# Patient Record
Sex: FEMALE | Race: WHITE | NOT HISPANIC OR LATINO | Employment: OTHER | URBAN - NONMETROPOLITAN AREA
[De-identification: names, ages, dates, MRNs, and addresses within clinical notes are randomized per-mention and may not be internally consistent; named-entity substitution may affect disease eponyms.]

---

## 2017-04-14 ENCOUNTER — HOSPITAL ENCOUNTER (OUTPATIENT)
Dept: LAB | Facility: MEDICAL CENTER | Age: 47
End: 2017-04-14
Attending: PHYSICIAN ASSISTANT
Payer: COMMERCIAL

## 2017-04-14 LAB
25(OH)D3 SERPL-MCNC: 23 NG/ML (ref 30–100)
ALBUMIN SERPL BCP-MCNC: 3.8 G/DL (ref 3.2–4.9)
ALBUMIN/GLOB SERPL: 1.1 G/DL
ALP SERPL-CCNC: 70 U/L (ref 30–99)
ALT SERPL-CCNC: 10 U/L (ref 2–50)
ANION GAP SERPL CALC-SCNC: 5 MMOL/L (ref 0–11.9)
AST SERPL-CCNC: 13 U/L (ref 12–45)
BASOPHILS # BLD AUTO: 0.5 % (ref 0–1.8)
BASOPHILS # BLD: 0.03 K/UL (ref 0–0.12)
BILIRUB SERPL-MCNC: 0.5 MG/DL (ref 0.1–1.5)
BUN SERPL-MCNC: 11 MG/DL (ref 8–22)
CALCIUM SERPL-MCNC: 9.2 MG/DL (ref 8.5–10.5)
CHLORIDE SERPL-SCNC: 104 MMOL/L (ref 96–112)
CHOLEST SERPL-MCNC: 159 MG/DL (ref 100–199)
CO2 SERPL-SCNC: 26 MMOL/L (ref 20–33)
CREAT SERPL-MCNC: 0.48 MG/DL (ref 0.5–1.4)
EOSINOPHIL # BLD AUTO: 0.04 K/UL (ref 0–0.51)
EOSINOPHIL NFR BLD: 0.6 % (ref 0–6.9)
ERYTHROCYTE [DISTWIDTH] IN BLOOD BY AUTOMATED COUNT: 44.1 FL (ref 35.9–50)
FOLATE SERPL-MCNC: 8.1 NG/ML
GFR SERPL CREATININE-BSD FRML MDRD: >60 ML/MIN/1.73 M 2
GLOBULIN SER CALC-MCNC: 3.4 G/DL (ref 1.9–3.5)
GLUCOSE SERPL-MCNC: 86 MG/DL (ref 65–99)
HCT VFR BLD AUTO: 38.5 % (ref 37–47)
HDLC SERPL-MCNC: 55 MG/DL
HGB BLD-MCNC: 12.8 G/DL (ref 12–16)
IMM GRANULOCYTES # BLD AUTO: 0 K/UL (ref 0–0.11)
IMM GRANULOCYTES NFR BLD AUTO: 0 % (ref 0–0.9)
IRON SATN MFR SERPL: 22 % (ref 15–55)
IRON SERPL-MCNC: 78 UG/DL (ref 40–170)
LDLC SERPL CALC-MCNC: 91 MG/DL
LYMPHOCYTES # BLD AUTO: 2.23 K/UL (ref 1–4.8)
LYMPHOCYTES NFR BLD: 36.2 % (ref 22–41)
MCH RBC QN AUTO: 30.7 PG (ref 27–33)
MCHC RBC AUTO-ENTMCNC: 33.2 G/DL (ref 33.6–35)
MCV RBC AUTO: 92.3 FL (ref 81.4–97.8)
MONOCYTES # BLD AUTO: 0.31 K/UL (ref 0–0.85)
MONOCYTES NFR BLD AUTO: 5 % (ref 0–13.4)
NEUTROPHILS # BLD AUTO: 3.55 K/UL (ref 2–7.15)
NEUTROPHILS NFR BLD: 57.7 % (ref 44–72)
NRBC # BLD AUTO: 0 K/UL
NRBC BLD AUTO-RTO: 0 /100 WBC
PLATELET # BLD AUTO: 250 K/UL (ref 164–446)
PMV BLD AUTO: 11.5 FL (ref 9–12.9)
POTASSIUM SERPL-SCNC: 4.3 MMOL/L (ref 3.6–5.5)
PREALB SERPL-MCNC: 19 MG/DL (ref 18–38)
PROT SERPL-MCNC: 7.2 G/DL (ref 6–8.2)
RBC # BLD AUTO: 4.17 M/UL (ref 4.2–5.4)
SODIUM SERPL-SCNC: 135 MMOL/L (ref 135–145)
TIBC SERPL-MCNC: 360 UG/DL (ref 250–450)
TRANSFERRIN SERPL-MCNC: 258 MG/DL (ref 200–370)
TRIGL SERPL-MCNC: 65 MG/DL (ref 0–149)
VIT B12 SERPL-MCNC: 721 PG/ML (ref 211–911)
WBC # BLD AUTO: 6.2 K/UL (ref 4.8–10.8)

## 2017-04-14 PROCEDURE — 84466 ASSAY OF TRANSFERRIN: CPT

## 2017-04-14 PROCEDURE — 82607 VITAMIN B-12: CPT

## 2017-04-14 PROCEDURE — 84134 ASSAY OF PREALBUMIN: CPT

## 2017-04-14 PROCEDURE — 80061 LIPID PANEL: CPT

## 2017-04-14 PROCEDURE — 83540 ASSAY OF IRON: CPT

## 2017-04-14 PROCEDURE — 36415 COLL VENOUS BLD VENIPUNCTURE: CPT

## 2017-04-14 PROCEDURE — 85025 COMPLETE CBC W/AUTO DIFF WBC: CPT

## 2017-04-14 PROCEDURE — 82306 VITAMIN D 25 HYDROXY: CPT

## 2017-04-14 PROCEDURE — 83550 IRON BINDING TEST: CPT

## 2017-04-14 PROCEDURE — 80053 COMPREHEN METABOLIC PANEL: CPT

## 2017-04-14 PROCEDURE — 82746 ASSAY OF FOLIC ACID SERUM: CPT

## 2017-04-14 PROCEDURE — 84425 ASSAY OF VITAMIN B-1: CPT

## 2017-04-18 LAB — VIT B1 BLD-MCNC: 102 NMOL/L (ref 70–180)

## 2017-05-04 ENCOUNTER — HOSPITAL ENCOUNTER (OUTPATIENT)
Dept: RADIOLOGY | Facility: MEDICAL CENTER | Age: 47
End: 2017-05-04
Attending: OBSTETRICS & GYNECOLOGY
Payer: COMMERCIAL

## 2017-05-04 DIAGNOSIS — R10.2 ADNEXAL TENDERNESS, RIGHT: ICD-10-CM

## 2017-05-04 PROCEDURE — 700117 HCHG RX CONTRAST REV CODE 255: Performed by: OBSTETRICS & GYNECOLOGY

## 2017-05-04 PROCEDURE — 72193 CT PELVIS W/DYE: CPT

## 2017-05-04 RX ADMIN — IOHEXOL 100 ML: 350 INJECTION, SOLUTION INTRAVENOUS at 09:21

## 2017-05-12 ENCOUNTER — HOSPITAL ENCOUNTER (OUTPATIENT)
Dept: RADIOLOGY | Facility: MEDICAL CENTER | Age: 47
End: 2017-05-12
Attending: PHYSICIAN ASSISTANT
Payer: COMMERCIAL

## 2017-05-12 DIAGNOSIS — E66.01 MORBID OBESITY, UNSPECIFIED OBESITY TYPE (HCC): ICD-10-CM

## 2017-05-12 DIAGNOSIS — Z90.3 HISTORY OF SLEEVE GASTRECTOMY: ICD-10-CM

## 2017-05-12 DIAGNOSIS — R11.0 NAUSEA: ICD-10-CM

## 2017-05-12 DIAGNOSIS — R11.2 NAUSEA AND VOMITING, INTRACTABILITY OF VOMITING NOT SPECIFIED, UNSPECIFIED VOMITING TYPE: ICD-10-CM

## 2017-05-12 DIAGNOSIS — K21.9 GASTROESOPHAGEAL REFLUX DISEASE, ESOPHAGITIS PRESENCE NOT SPECIFIED: ICD-10-CM

## 2017-05-12 PROCEDURE — 74241 DX-UPPER GI-SERIES WITH KUB: CPT

## 2017-05-13 ENCOUNTER — OFFICE VISIT (OUTPATIENT)
Dept: URGENT CARE | Facility: PHYSICIAN GROUP | Age: 47
End: 2017-05-13
Payer: COMMERCIAL

## 2017-05-13 VITALS
OXYGEN SATURATION: 98 % | RESPIRATION RATE: 16 BRPM | DIASTOLIC BLOOD PRESSURE: 80 MMHG | TEMPERATURE: 99 F | HEART RATE: 80 BPM | BODY MASS INDEX: 51.67 KG/M2 | SYSTOLIC BLOOD PRESSURE: 124 MMHG | WEIGHT: 293 LBS

## 2017-05-13 DIAGNOSIS — H65.111 ACUTE MUCOID OTITIS MEDIA OF RIGHT EAR: ICD-10-CM

## 2017-05-13 DIAGNOSIS — H73.012 BULLOUS MYRINGITIS, LEFT: ICD-10-CM

## 2017-05-13 PROCEDURE — 99214 OFFICE O/P EST MOD 30 MIN: CPT | Performed by: PHYSICIAN ASSISTANT

## 2017-05-13 RX ORDER — AMOXICILLIN AND CLAVULANATE POTASSIUM 875; 125 MG/1; MG/1
1 TABLET, FILM COATED ORAL 2 TIMES DAILY
Qty: 20 TAB | Refills: 0 | Status: SHIPPED | OUTPATIENT
Start: 2017-05-13 | End: 2017-05-23

## 2017-05-13 RX ORDER — HYDROCODONE BITARTRATE AND ACETAMINOPHEN 7.5; 325 MG/1; MG/1
1-2 TABLET ORAL EVERY 6 HOURS PRN
Qty: 10 TAB | Refills: 0 | Status: SHIPPED | OUTPATIENT
Start: 2017-05-13 | End: 2017-12-20

## 2017-05-13 RX ORDER — OFLOXACIN 3 MG/ML
5 SOLUTION AURICULAR (OTIC) 2 TIMES DAILY
Qty: 1 BOTTLE | Refills: 1 | Status: SHIPPED | OUTPATIENT
Start: 2017-05-13 | End: 2017-12-20

## 2017-05-13 ASSESSMENT — ENCOUNTER SYMPTOMS
CARDIOVASCULAR NEGATIVE: 1
GASTROINTESTINAL NEGATIVE: 1
PSYCHIATRIC NEGATIVE: 1
DIZZINESS: 0
RESPIRATORY NEGATIVE: 1
MUSCULOSKELETAL NEGATIVE: 1
CONSTITUTIONAL NEGATIVE: 1
EYES NEGATIVE: 1

## 2017-05-13 NOTE — PATIENT INSTRUCTIONS

## 2017-05-13 NOTE — MR AVS SNAPSHOT
Nneka Ruiz   2017 9:25 AM   Office Visit   MRN: [id number]    Department:  [facility]   Dept Phone:  [phone]    Description:  Female : 1970   Provider:  Andrey Maza PA-C           Reason for Visit     Otalgia           Allergies as of 2017     Allergen Noted Reactions    Aspirin 2007   Vomiting    Other Misc 2016       Waterproof band aids took off skin and caused infection       You were diagnosed with     Acute mucoid otitis media of right ear   [6389447]       Bullous myringitis, left   [[id number]]         Vital Signs     Blood Pressure Pulse Temperature Respirations Weight Oxygen Saturation    124/80 mmHg 80 37.2 °C (99 °F) 16 149.687 kg (330 lb) 98%    Smoking Status                   Former Smoker           Basic Information     Date Of Birth Sex Race Ethnicity Preferred Language    1970 Female White Non- English      Problem List              ICD-10-CM Priority Class Noted - Resolved    Ruptured ear drum H72.90   2012 - Present    Depression F32.9   2012 - Present    Pedal edema R60.0   2012 - Present    Obesity E66.9   2012 - Present    Central perforation of tympanic membrane H72.00   2012 - Present    Tear of medial cartilage or meniscus of knee, current [id number]   12/10/2014 - Present    Morbid obesity (CMS-HCC) E66.01   2/3/2016 - Present      Health Maintenance        Date Due Completion Dates    IMM DTaP/Tdap/Td Vaccine (1 - Tdap) 1991 ---    PAP SMEAR 1993 ---    MAMMOGRAM 2012 ---            Current Immunizations     Influenza Vaccine Adult HD 2015    Pertussis Vaccine 2015      Below and/or attached are the medications your provider expects you to take. Review all of your home medications and newly ordered medications with your provider and/or pharmacist. Follow medication instructions as directed by your provider and/or pharmacist. Please keep your medication list with you  and share with your provider. Update the information when medications are discontinued, doses are changed, or new medications (including over-the-counter products) are added; and carry medication information at all times in the event of emergency situations     Allergies:  ASPIRIN - Vomiting     OTHER MISC - (reactions not documented)               Medications  Valid as of: May 13, 2017 -  3:21 PM    Generic Name Brand Name Tablet Size Instructions for use    Acetaminophen (Tab) TYLENOL 500 MG Take 500-1,000 mg by mouth every 6 hours as needed.        Amoxicillin-Pot Clavulanate (Tab) AUGMENTIN 875-125 MG Take 1 Tab by mouth 2 times a day for 10 days.        Ampicillin (Cap) PRINCIPEN 500 MG Take 500 mg by mouth 4 times a day.        Biotin (Tab) Biotin 5000 MCG Take 2 Tabs by mouth every day.        Citalopram Hydrobromide (Tab) CELEXA 20 MG Take 20 mg by mouth every morning.        Hydrocodone-Acetaminophen (Tab) NORCO 7.5-325 MG Take 1-2 Tabs by mouth every 6 hours as needed.        Lisinopril-Hydrochlorothiazide (Tab) PRINZIDE, ZESTORETIC 20-12.5 MG Take 1 Tab by mouth every morning.        Meclizine HCl (Tab) ANTIVERT 25 MG Take 25 mg by mouth 3 times a day as needed.        Multiple Vitamins-Minerals (Tab) HAIR/SKIN/NAILS  Take 1 Tab by mouth every day.        Ofloxacin (Solution) FLOXIN OTIC 0.3 % Place 5 Drops in ear 2 times a day.        Ondansetron HCl (Tab) ZOFRAN 4 MG Take 1 Tab by mouth every 8 hours as needed for Nausea/Vomiting.        Sulfamethoxazole-Trimethoprim (Tab) BACTRIM -160 MG Take 1 Tab by mouth 2 times a day.        .                 Medicines prescribed today were sent to:     [id number] - [place], [place] - [address] [facility] [address]    [address] [place] [address]    Phone: [phone] Fax: [phone]    Open 24 Hours?: No      Medication refill instructions:       If your prescription bottle indicates you have medication  refills left, it is not necessary to call your provider’s office. Please contact your pharmacy and they will refill your medication.    If your prescription bottle indicates you do not have any refills left, you may request refills at any time through one of the following ways: The online [employer] system (except Urgent Care), by calling your provider’s office, or by asking your pharmacy to contact your provider’s office with a refill request. Medication refills are processed only during regular business hours and may not be available until the next business day. Your provider may request additional information or to have a follow-up visit with you prior to refilling your medication.   *Please Note: Medication refills are assigned a new Rx number when refilled electronically. Your pharmacy may indicate that no refills were authorized even though a new prescription for the same medication is available at the pharmacy. Please request the medicine by name with the pharmacy before contacting your provider for a refill.        Instructions    Otitis Media, Adult  Otitis media is redness, soreness, and inflammation of the middle ear. Otitis media may be caused by allergies or, most commonly, by infection. Often it occurs as a complication of the common cold.  SIGNS AND SYMPTOMS  Symptoms of otitis media may include:  · Earache.  · Fever.  · Ringing in your ear.  · Headache.  · Leakage of fluid from the ear.  DIAGNOSIS  To diagnose otitis media, your health care provider will examine your ear with an otoscope. This is an instrument that allows your health care provider to see into your ear in order to examine your eardrum. Your health care provider also will ask you questions about your symptoms.  TREATMENT   Typically, otitis media resolves on its own within 3-5 days. Your health care provider may prescribe medicine to ease your symptoms of pain. If otitis media does not resolve within 5 days or is recurrent, your health  care provider may prescribe antibiotic medicines if he or she suspects that a bacterial infection is the cause.  HOME CARE INSTRUCTIONS   · If you were prescribed an antibiotic medicine, finish it all even if you start to feel better.  · Take medicines only as directed by your health care provider.  · Keep all follow-up visits as directed by your health care provider.  SEEK MEDICAL CARE IF:  · You have otitis media only in one ear, or bleeding from your nose, or both.  · You notice a lump on your neck.  · You are not getting better in 3-5 days.  · You feel worse instead of better.  SEEK IMMEDIATE MEDICAL CARE IF:   · You have pain that is not controlled with medicine.  · You have swelling, redness, or pain around your ear or stiffness in your neck.  · You notice that part of your face is paralyzed.  · You notice that the bone behind your ear (mastoid) is tender when you touch it.  MAKE SURE YOU:   · Understand these instructions.  · Will watch your condition.  · Will get help right away if you are not doing well or get worse.     This information is not intended to replace advice given to you by your health care provider. Make sure you discuss any questions you have with your health care provider.     Document Released: 09/22/2005 Document Revised: 01/08/2016 Document Reviewed: 07/15/2014  [employer] Patient Education ©2016 [employer]            [employer] Access Code: Activation code not generated  Current [employer] Status: Active

## 2017-05-13 NOTE — PROGRESS NOTES
Subjective:      Nneka Ruiz is a 46 y.o. female who presents with Otalgia            Otalgia   Associated symptoms include ear discharge.     Chief Complaint   Patient presents with   • Otalgia       HPI:  Nneka Ruiz is a 46 y.o. female who presents with ear pain both sides.  Right ruptured yesterday.  Left ear ruptured this am.  Dr. Savage rx cipro based ear drops, >$200 patient did not .  Picked up cortisporin instead and has been using for the past several days.  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  Hx of repeat OM with perforations.  Not on oral abx.  Ear pain since Wednesday.  Patient denies HA, SOB, chest pain, palpitations, fever, chills, or n/v/d.      Past Medical History   Diagnosis Date   • Pain 2014     left knee   • Anxiety    • Anemia in pregnancy    • Hypertension    • Dental disorder      lower partial   • Psychiatric problem      anxiety       Past Surgical History   Procedure Laterality Date   • Tubal ligation     • Tonsillectomy     • Tympanoplasty  11/6/2012     Performed by Jonn Kaufman M.D. at SURGERY SAME DAY [facility]   • Knee arthroscopy  12/10/2014     Performed by Mikel Spencer M.D. at SURGERY [facility]   • Medial meniscectomy  12/10/2014     Performed by Mikel Spencer M.D. at [facility]   • Meniscectomy  12/10/2014     Performed by Mikel Spencer M.D. at [facility]   • Lateral release  12/10/2014     Performed by Mikel Spencer M.D. at [facility]   • Gastric sleeve laparoscopy N/A 2/3/2016     Procedure: GASTRIC SLEEVE LAPAROSCOPY AND LIVER BIOPSY;  Surgeon: Keegan Hernandez M.D.;  Location: SURGERY [facility];  Service:        No family history on file.    Social History     Social History   • Marital Status:      Spouse Name: N/A   • Number of Children: N/A   • Years of Education: N/A     Occupational History   • Not on file.     Social History Main  Topics   • Smoking status: Former Smoker -- 1.00 packs/day for 12 years     Types: Cigarettes     Quit date: 12/01/2014   • Smokeless tobacco: Never Used      Comment: 2 yrs  1/2 ppd   • Alcohol Use: Yes      Comment: 0-2 drinks per month   • Drug Use: No      Comment: meth  non since 2001   • Sexual Activity: Not on file     Other Topics Concern   • Not on file     Social History Narrative         Current outpatient prescriptions:   •  sulfamethoxazole-trimethoprim, 1 Tab, Oral, BID  •  ampicillin, 500 mg, Oral, 4XDAY, not taking  •  meclizine, 25 mg, Oral, TID PRN, prn  •  ondansetron, 4 mg, Oral, Q8HRS PRN, prn  •  lisinopril-hydrochlorothiazide, 1 Tab, Oral, QAM, 7/21/2016 at 0700  •  citalopram, 20 mg, Oral, QAM, 7/21/2016 at 0700  •  Biotin, 2 Tab, Oral, DAILY, not taking  •  HAIR/SKIN/NAILS, 1 Tab, Oral, DAILY, 7/21/2016  •  acetaminophen, 500-1,000 mg, Oral, Q6HRS PRN, 7/21/2016    Allergies   Allergen Reactions   • Aspirin Vomiting   • Other Misc      Waterproof band aids took off skin and caused infection             Review of Systems   Constitutional: Negative.    HENT: Positive for congestion, ear discharge and ear pain.    Eyes: Negative.    Respiratory: Negative.    Cardiovascular: Negative.    Gastrointestinal: Negative.    Genitourinary: Negative.    Musculoskeletal: Negative.    Skin: Negative.    Neurological: Negative for dizziness.   Endo/Heme/Allergies: Negative.    Psychiatric/Behavioral: Negative.           Objective:     /80 mmHg  Pulse 80  Temp(Src) 37.2 °C (99 °F)  Resp 16  Wt 149.687 kg (330 lb)  SpO2 98%     Physical Exam       Nursing note and vitals reviewed.    Constitutional:   Appropriately groomed, pleasant affect, well nourished, in NAD.    Head:   Normocephalic, atraumatic.    Eyes:   PERRLA, EOM's full, sclera white, conjunctiva not erythematous, and medial canthus without exudate bilaterally.    Ears:  Tragus tender to manipulation.  No pre-auricular lymphadenopathy  or mastoid ttp.  EACs with mild cerumen bilaterally, not erythematous.  Left TM bulging and injected with loss of anatomical landmarks and bullae present on TM.  Mucous present in EAC.  Right TM with Mucopurulent fluid present in the inner ear and bulging.  No apparent perforation. Neither TM vibrates with valsalva maneuver, patient occluding nares bilaterally.  Concern for micro perforation.  No mastoid ttp bilaterally.  Pain only with auricular manipulation bilaterally.  Minimal anterior cervical chain lymphadenopathy.  Hearing grossly intact to voice.    Nose:  Nares bilaterally.  Nasal mucosa not edematous. Maxillary Sinuses mildly tender to percussion bilaterally.    Throat:  Dentition wnl, mucosa moist without lesions.  Oropharynx mildly erythematous, with no enlargement of the palatine tonsils bilaterally with no exudates.    Post nasal drainage present.  Soft palate rises symmetrically bilaterally and uvula midline.      Neck: Neck supple, with mild anterior lymphadenopathy that is soft and mobile to palpation. Thyroid non-palpable without tenderness or nodules. No supraclavicular lymphadenopathy.    Lungs:  Respiratory effort not labored without accessory muscle use.  Lungs clear to auscultation bilaterally without wheezes or rales. Rhonchi clear to cough.    Heart:  RRR, without murmurs rubs or gallops.  Radial and dorsalis pedis pulse 2+ bilaterally.  No LE edema.    Musculoskeletal:  Gait non-antalgic with a narrow base.    Derm:  Skin without rashes or lesions with good turgor pressure.      Psychiatric:  Mood, affect, and judgement appropriate.         Assessment/Plan:     1. Acute mucoid otitis media of right ear  Patient presents with otitis media bilaterally with bullous myringitis of the left. Concern for perforation as mucopurulent material present in the external auditory canal bilaterally. No visible perforation, however TMs do not vibrate with Valsalva maneuver. Patient prescribed  neomycin-based antibiotic, topical. Discussed with patient to immediately stop this due to concern for inner ear bone damage. Prescribed Augmentin ×10 days and ofloxacin eardrops. Discussed with patient how to administer. Patient to follow-up with urgent care in 3-4 days or ENT for reevaluation. Pain medication for bedtime only. Discussed not driving with this and not combining with antianxiety medication.    Narcotic use report obtained with no indication of narcotic overuse or misuse and deemed necessary for treatment. Sedation, dependence, and constipation precautions given. Avoid use while driving or operating heavy machinery.     Patient was in agreement with this treatment plan and seemed to understand without barriers. All questions were encouraged and answered.  Reviewed signs and symptoms of when to seek emergency medical care.     Please note that this dictation was created using voice recognition software.  I have made every reasonable attempt to correct obvious errors, but I expect there are errors of vandana and possibly content that I did not discover before finalizing the note.     - amoxicillin-clavulanate (AUGMENTIN) 875-125 MG Tab; Take 1 Tab by mouth 2 times a day for 10 days.  Dispense: 20 Tab; Refill: 0  - ofloxacin otic sol (FLOXIN OTIC) 0.3 % Solution; Place 5 Drops in ear 2 times a day.  Dispense: 1 Bottle; Refill: 1  - hydrocodone-acetaminophen (NORCO) 7.5-325 MG per tablet; Take 1-2 Tabs by mouth every 6 hours as needed.  Dispense: 10 Tab; Refill: 0    2. Bullous myringitis, left    - amoxicillin-clavulanate (AUGMENTIN) 875-125 MG Tab; Take 1 Tab by mouth 2 times a day for 10 days.  Dispense: 20 Tab; Refill: 0  - ofloxacin otic sol (FLOXIN OTIC) 0.3 % Solution; Place 5 Drops in ear 2 times a day.  Dispense: 1 Bottle; Refill: 1  - hydrocodone-acetaminophen (NORCO) 7.5-325 MG per tablet; Take 1-2 Tabs by mouth every 6 hours as needed.  Dispense: 10 Tab; Refill: 0

## 2017-05-17 ENCOUNTER — HOSPITAL ENCOUNTER (OUTPATIENT)
Facility: MEDICAL CENTER | Age: 47
End: 2017-05-17
Attending: COLON & RECTAL SURGERY | Admitting: COLON & RECTAL SURGERY
Payer: COMMERCIAL

## 2017-05-17 ENCOUNTER — APPOINTMENT (OUTPATIENT)
Dept: RADIOLOGY | Facility: MEDICAL CENTER | Age: 47
End: 2017-05-17
Attending: COLON & RECTAL SURGERY
Payer: COMMERCIAL

## 2017-05-17 VITALS
DIASTOLIC BLOOD PRESSURE: 102 MMHG | TEMPERATURE: 97.5 F | HEIGHT: 67 IN | HEART RATE: 93 BPM | RESPIRATION RATE: 16 BRPM | BODY MASS INDEX: 45.99 KG/M2 | SYSTOLIC BLOOD PRESSURE: 138 MMHG | WEIGHT: 293 LBS | OXYGEN SATURATION: 93 %

## 2017-05-17 PROBLEM — K22.2 STENOSIS OF ESOPHAGUS: Status: ACTIVE | Noted: 2017-05-17

## 2017-05-17 LAB
BASOPHILS # BLD AUTO: 0.7 % (ref 0–1.8)
BASOPHILS # BLD: 0.05 K/UL (ref 0–0.12)
EOSINOPHIL # BLD AUTO: 0.24 K/UL (ref 0–0.51)
EOSINOPHIL NFR BLD: 3.2 % (ref 0–6.9)
ERYTHROCYTE [DISTWIDTH] IN BLOOD BY AUTOMATED COUNT: 42.5 FL (ref 35.9–50)
HCG UR QL: NEGATIVE
HCT VFR BLD AUTO: 36.8 % (ref 37–47)
HGB BLD-MCNC: 12.5 G/DL (ref 12–16)
IMM GRANULOCYTES # BLD AUTO: 0.02 K/UL (ref 0–0.11)
IMM GRANULOCYTES NFR BLD AUTO: 0.3 % (ref 0–0.9)
LYMPHOCYTES # BLD AUTO: 2.28 K/UL (ref 1–4.8)
LYMPHOCYTES NFR BLD: 30 % (ref 22–41)
MCH RBC QN AUTO: 30.3 PG (ref 27–33)
MCHC RBC AUTO-ENTMCNC: 34 G/DL (ref 33.6–35)
MCV RBC AUTO: 89.3 FL (ref 81.4–97.8)
MONOCYTES # BLD AUTO: 0.41 K/UL (ref 0–0.85)
MONOCYTES NFR BLD AUTO: 5.4 % (ref 0–13.4)
NEUTROPHILS # BLD AUTO: 4.61 K/UL (ref 2–7.15)
NEUTROPHILS NFR BLD: 60.4 % (ref 44–72)
NRBC # BLD AUTO: 0 K/UL
NRBC BLD AUTO-RTO: 0 /100 WBC
PLATELET # BLD AUTO: 241 K/UL (ref 164–446)
PMV BLD AUTO: 10.7 FL (ref 9–12.9)
RBC # BLD AUTO: 4.12 M/UL (ref 4.2–5.4)
SP GR UR REFRACTOMETRY: 1.01
WBC # BLD AUTO: 7.6 K/UL (ref 4.8–10.8)

## 2017-05-17 PROCEDURE — 160002 HCHG RECOVERY MINUTES (STAT): Performed by: COLON & RECTAL SURGERY

## 2017-05-17 PROCEDURE — 160203 HCHG ENDO MINUTES - 1ST 30 MINS LEVEL 4: Performed by: COLON & RECTAL SURGERY

## 2017-05-17 PROCEDURE — 160046 HCHG PACU - 1ST 60 MINS PHASE II: Performed by: COLON & RECTAL SURGERY

## 2017-05-17 PROCEDURE — 700111 HCHG RX REV CODE 636 W/ 250 OVERRIDE (IP)

## 2017-05-17 PROCEDURE — 160025 RECOVERY II MINUTES (STATS): Performed by: COLON & RECTAL SURGERY

## 2017-05-17 PROCEDURE — 700101 HCHG RX REV CODE 250

## 2017-05-17 PROCEDURE — 160035 HCHG PACU - 1ST 60 MINS PHASE I: Performed by: COLON & RECTAL SURGERY

## 2017-05-17 PROCEDURE — 160009 HCHG ANES TIME/MIN: Performed by: COLON & RECTAL SURGERY

## 2017-05-17 PROCEDURE — 502240 HCHG MISC OR SUPPLY RC 0272: Performed by: COLON & RECTAL SURGERY

## 2017-05-17 PROCEDURE — 160048 HCHG OR STATISTICAL LEVEL 1-5: Performed by: COLON & RECTAL SURGERY

## 2017-05-17 PROCEDURE — 85025 COMPLETE CBC W/AUTO DIFF WBC: CPT

## 2017-05-17 PROCEDURE — 81025 URINE PREGNANCY TEST: CPT

## 2017-05-17 PROCEDURE — C1726 CATH, BAL DIL, NON-VASCULAR: HCPCS | Performed by: COLON & RECTAL SURGERY

## 2017-05-17 PROCEDURE — 500066 HCHG BITE BLOCK, ECT: Performed by: COLON & RECTAL SURGERY

## 2017-05-17 RX ORDER — MIDAZOLAM HYDROCHLORIDE 1 MG/ML
INJECTION INTRAMUSCULAR; INTRAVENOUS
Status: COMPLETED
Start: 2017-05-17 | End: 2017-05-17

## 2017-05-17 RX ORDER — OMEPRAZOLE 40 MG/1
40 CAPSULE, DELAYED RELEASE ORAL DAILY
Qty: 30 CAP | Refills: 3 | Status: SHIPPED | OUTPATIENT
Start: 2017-05-17 | End: 2019-03-01 | Stop reason: CLARIF

## 2017-05-17 RX ORDER — LIDOCAINE HYDROCHLORIDE 10 MG/ML
INJECTION, SOLUTION INFILTRATION; PERINEURAL
Status: COMPLETED
Start: 2017-05-17 | End: 2017-05-17

## 2017-05-17 RX ORDER — SUCRALFATE ORAL 1 G/10ML
1 SUSPENSION ORAL 4 TIMES DAILY
Qty: 1240 ML | Refills: 0 | Status: SHIPPED | OUTPATIENT
Start: 2017-05-17 | End: 2017-06-17

## 2017-05-17 RX ORDER — LIDOCAINE AND PRILOCAINE 25; 25 MG/G; MG/G
1 CREAM TOPICAL
Status: COMPLETED | OUTPATIENT
Start: 2017-05-17 | End: 2017-05-17

## 2017-05-17 RX ORDER — LIDOCAINE HYDROCHLORIDE 10 MG/ML
0.5 INJECTION, SOLUTION INFILTRATION; PERINEURAL
Status: COMPLETED | OUTPATIENT
Start: 2017-05-17 | End: 2017-05-17

## 2017-05-17 RX ORDER — SODIUM CHLORIDE, SODIUM LACTATE, POTASSIUM CHLORIDE, CALCIUM CHLORIDE 600; 310; 30; 20 MG/100ML; MG/100ML; MG/100ML; MG/100ML
INJECTION, SOLUTION INTRAVENOUS CONTINUOUS
Status: DISCONTINUED | OUTPATIENT
Start: 2017-05-17 | End: 2017-05-17 | Stop reason: HOSPADM

## 2017-05-17 RX ADMIN — MIDAZOLAM 2 MG: 1 INJECTION INTRAMUSCULAR; INTRAVENOUS at 08:16

## 2017-05-17 RX ADMIN — SODIUM CHLORIDE, SODIUM LACTATE, POTASSIUM CHLORIDE, CALCIUM CHLORIDE: 600; 310; 30; 20 INJECTION, SOLUTION INTRAVENOUS at 07:11

## 2017-05-17 RX ADMIN — LIDOCAINE HYDROCHLORIDE 0.5 ML: 10 INJECTION, SOLUTION INFILTRATION; PERINEURAL at 07:10

## 2017-05-17 ASSESSMENT — PAIN SCALES - GENERAL
PAINLEVEL_OUTOF10: 0

## 2017-05-17 NOTE — IP AVS SNAPSHOT
" Home Care Instructions                                                                                                                Name:Nneka Ruiz  Medical Record Number:[id number]  CSN: [id number]    YOB: 1970   Age: 46 y.o.  Sex: female  HT:1.702 m (5' 7\") WT: 150.3 kg (331 lb 5.6 oz)          Admit Date: 5/17/2017     Discharge Date:   Today's Date: 5/17/2017  Attending Doctor:  Keegan Hernandez M.D.                  Allergies:  Aspirin and Other misc                Discharge Instructions         ACTIVITY: Rest and take it easy for the first 24 hours.  A responsible adult is recommended to remain with you during that time.  It is normal to feel sleepy.  We encourage you to not do anything that requires balance, judgment or coordination.    MILD FLU-LIKE SYMPTOMS ARE NORMAL. YOU MAY EXPERIENCE GENERALIZED MUSCLE ACHES, THROAT IRRITATION, HEADACHE AND/OR SOME NAUSEA.    FOR 24 HOURS DO NOT:  Drive, operate machinery or run household appliances.  Drink beer or alcoholic beverages.   Make important decisions or sign legal documents.     SPECIAL INSTRUCTIONS:   *  1. DIET: Clear liquids and transition to shakes and smoothies. Progressing as instructed will make for a smooth transition after surgery and prevent any pain associated with eating foods before your stomach is ready or eating too much. Water and hydration is much more important than food intake the first week or two after surgery. Drink enough water to keep your urine pale yellow. Increase water intake if your urine is a darker yellow or if have burning with urination. Nausea and vomiting once or twice after you leave the hospital is normal. Sip fluids continually and stay hydrated.    2. SUPPLEMENTS: Start your supplements day after surgery. You may need to cut some supplements in half initially. Follow the guidelines from your supplement handout from your pre-op class.   **    FOLLOW-UP APPOINTMENT:  A follow-up appointment should be " arranged with your doctor in ***; call to schedule.    You should CALL YOUR PHYSICIAN if you develop:  Fever greater than 101 degrees F.  Pain not relieved by medication, or persistent nausea or vomiting.  Excessive bleeding (blood soaking through dressing) or unexpected drainage from the wound.  Extreme redness or swelling around the incision site, drainage of pus or foul smelling drainage.  Inability to urinate or empty your bladder within 8 hours.  Problems with breathing or chest pain.    You should call 911 if you develop problems with breathing or chest pain.  If you are unable to contact your doctor or surgical center, you should go to the nearest emergency room or urgent care center. Dr Hernandez  telephone #: *[phone]**    If any questions arise, call your doctor.  If your doctor is not available, please feel free to call the Surgical Center at [phone].  The Center is open Monday through Friday from 7AM to 7PM.  You can also call the HEALTH HOTLINE open 24 hours/day, 7 days/week and speak to a nurse at [phone], or toll free at [phone].    A registered nurse may call you a few days after your surgery to see how you are doing after your procedure.    MEDICATIONS: Resume taking daily medication.  Take prescribed pain medication with food.  If no medication is prescribed, you may take non-aspirin pain medication if needed.  PAIN MEDICATION CAN BE VERY CONSTIPATING.  Take a stool softener or laxative such as senokot, pericolace, or milk of magnesia if needed.    Prescription given for *prilosec and carafate**.  Last pain medication given at *none**.    If your physician has prescribed pain medication that includes Acetaminophen (Tylenol), do not take additional Acetaminophen (Tylenol) while taking the prescribed medication.    Depression / Suicide Risk    As you are discharged from this [facility] facility, it is important to learn how to keep safe from harming yourself.    Recognize the  warning signs:  · Abrupt changes in personality, positive or negative- including increase in energy   · Giving away possessions  · Change in eating patterns- significant weight changes-  positive or negative  · Change in sleeping patterns- unable to sleep or sleeping all the time   · Unwillingness or inability to communicate  · Depression  · Unusual sadness, discouragement and loneliness  · Talk of wanting to die  · Neglect of personal appearance   · Rebelliousness- reckless behavior  · Withdrawal from people/activities they love  · Confusion- inability to concentrate     If you or a loved one observes any of these behaviors or has concerns about self-harm, here's what you can do:  · Talk about it- your feelings and reasons for harming yourself  · Remove any means that you might use to hurt yourself (examples: pills, rope, extension cords, firearm)  · Get professional help from the community (Mental Health, Substance Abuse, psychological counseling)  · Do not be alone:Call your Safe Contact- someone whom you trust who will be there for you.  · Call your local CRISIS HOTLINE 693-3607 or 023-217-5665  · Call your local Children's Mobile Crisis Response Team [place] (092) 038-7030 or www.[employer]  · Call the toll free National Suicide Prevention Hotlines   · National Suicide Prevention Lifeline 831-451-ZHPR (3300)  · National Hope Line Network 800-SUICIDE (750-5518)       Medication List      START taking these medications        Instructions    Morning Afternoon Evening Bedtime    omeprazole 40 MG delayed-release capsule   Commonly known as:  PRILOSEC        Take 1 Cap by mouth every day.   Dose:  40 mg                        sucralfate 1 GM/10ML Susp   Commonly known as:  CARAFATE        Take 10 mL by mouth 4 times a day for 31 days.   Dose:  1 g                          CONTINUE taking these medications        Instructions    Morning Afternoon Evening Bedtime    acetaminophen 500 MG Tabs   Commonly  known as:  TYLENOL        Take 500-1,000 mg by mouth every 6 hours as needed.   Dose:  500-1000 mg                        amoxicillin-clavulanate 875-125 MG Tabs   Commonly known as:  AUGMENTIN        Take 1 Tab by mouth 2 times a day for 10 days.   Dose:  1 Tab                        citalopram 20 MG Tabs   Commonly known as:  CELEXA        Take 20 mg by mouth every morning.   Dose:  20 mg                        hydrocodone-acetaminophen 7.5-325 MG per tablet   Commonly known as:  NORCO        Take 1-2 Tabs by mouth every 6 hours as needed.   Dose:  1-2 Tab                        lisinopril-hydrochlorothiazide 20-12.5 MG per tablet   Commonly known as:  PRINZIDE, ZESTORETIC        Take 1 Tab by mouth every morning.   Dose:  1 Tab                        multivitamin Tabs        Take 1 Tab by mouth every day.   Dose:  1 Tab                        ofloxacin otic sol 0.3 % Soln   Commonly known as:  FLOXIN OTIC        Place 5 Drops in ear 2 times a day.   Dose:  5 Drop                             Where to Get Your Medications      You can get these medications from any pharmacy     Bring a paper prescription for each of these medications    - omeprazole 40 MG delayed-release capsule  - sucralfate 1 GM/10ML Susp            Medication Information     Above and/or attached are the medications your physician expects you to take upon discharge. Review all of your home medications and newly ordered medications with your doctor and/or pharmacist. Follow medication instructions as directed by your doctor and/or pharmacist. Please keep your medication list with you and share with your physician. Update the information when medications are discontinued, doses are changed, or new medications (including over-the-counter products) are added; and carry medication information at all times in the event of emergency situations.        Resources     Quit Smoking / Tobacco Use:    I understand the use of any tobacco products increases my  chance of suffering from future heart disease or stroke and could cause other illnesses which may shorten my life. Quitting the use of tobacco products is the single most important thing I can do to improve my health. For further information on smoking / tobacco cessation call a Toll Free Quit Line at 1-310.437.1318 (*National [employer]) or 1-934.373.7676 (American Lung Association) or you can access the web based program at www.lungusa.org.    [place] Tobacco Users Help Line:  (345) 336-9624       Toll Free: 1-579.914.3360  Quit Tobacco Program [facility] (270)240-0198    Crisis Hotline:    [place] Crisis Hotline:  1-034-EOSEFAN or 1-380.822.1387    [place] Crisis Hotline:    1-640.957.8650 or 639-811-8725    Discharge Survey:   Thank you for choosing [facility]. We hope we did everything we could to make your hospital stay a pleasant one. You may be receiving a survey and we would appreciate your time and participation in answering the questions. Your input is very valuable to us in our efforts to improve our service to our patients and their families.            Signatures     My signature on this form indicates that:    1. I acknowledge receipt and understanding of these Home Care Instruction.  2. My questions regarding this information have been answered to my satisfaction.  3. I have formulated a plan with my discharge nurse to obtain my prescribed medications for home.    __________________________________      __________________________________                   Patient Signature                                 Guardian/Responsible Adult Signature      __________________________________                 __________       ________                       Nurse Signature                                               Date                 Time

## 2017-05-17 NOTE — DISCHARGE INSTRUCTIONS
ACTIVITY: Rest and take it easy for the first 24 hours.  A responsible adult is recommended to remain with you during that time.  It is normal to feel sleepy.  We encourage you to not do anything that requires balance, judgment or coordination.    MILD FLU-LIKE SYMPTOMS ARE NORMAL. YOU MAY EXPERIENCE GENERALIZED MUSCLE ACHES, THROAT IRRITATION, HEADACHE AND/OR SOME NAUSEA.    FOR 24 HOURS DO NOT:  Drive, operate machinery or run household appliances.  Drink beer or alcoholic beverages.   Make important decisions or sign legal documents.     SPECIAL INSTRUCTIONS:   *  1. DIET: Clear liquids and transition to shakes and smoothies. Progressing as instructed will make for a smooth transition after surgery and prevent any pain associated with eating foods before your stomach is ready or eating too much. Water and hydration is much more important than food intake the first week or two after surgery. Drink enough water to keep your urine pale yellow. Increase water intake if your urine is a darker yellow or if have burning with urination. Nausea and vomiting once or twice after you leave the hospital is normal. Sip fluids continually and stay hydrated.    2. SUPPLEMENTS: Start your supplements day after surgery. You may need to cut some supplements in half initially. Follow the guidelines from your supplement handout from your pre-op class.   **    FOLLOW-UP APPOINTMENT:  A follow-up appointment should be arranged with your doctor in ***; call to schedule.    You should CALL YOUR PHYSICIAN if you develop:  Fever greater than 101 degrees F.  Pain not relieved by medication, or persistent nausea or vomiting.  Excessive bleeding (blood soaking through dressing) or unexpected drainage from the wound.  Extreme redness or swelling around the incision site, drainage of pus or foul smelling drainage.  Inability to urinate or empty your bladder within 8 hours.  Problems with breathing or chest pain.    You should call 911 if you  develop problems with breathing or chest pain.  If you are unable to contact your doctor or surgical center, you should go to the nearest emergency room or urgent care center. Dr Hernandez  telephone #: *[phone]**    If any questions arise, call your doctor.  If your doctor is not available, please feel free to call the Surgical Center at [phone].  The Center is open Monday through Friday from 7AM to 7PM.  You can also call the HEALTH HOTLINE open 24 hours/day, 7 days/week and speak to a nurse at [phone], or toll free at [phone].    A registered nurse may call you a few days after your surgery to see how you are doing after your procedure.    MEDICATIONS: Resume taking daily medication.  Take prescribed pain medication with food.  If no medication is prescribed, you may take non-aspirin pain medication if needed.  PAIN MEDICATION CAN BE VERY CONSTIPATING.  Take a stool softener or laxative such as senokot, pericolace, or milk of magnesia if needed.    Prescription given for *prilosec and carafate**.  Last pain medication given at *none**.    If your physician has prescribed pain medication that includes Acetaminophen (Tylenol), do not take additional Acetaminophen (Tylenol) while taking the prescribed medication.    Depression / Suicide Risk    As you are discharged from this [facility] [place] [facility] facility, it is important to learn how to keep safe from harming yourself.    Recognize the warning signs:  · Abrupt changes in personality, positive or negative- including increase in energy   · Giving away possessions  · Change in eating patterns- significant weight changes-  positive or negative  · Change in sleeping patterns- unable to sleep or sleeping all the time   · Unwillingness or inability to communicate  · Depression  · Unusual sadness, discouragement and loneliness  · Talk of wanting to die  · Neglect of personal appearance   · Rebelliousness- reckless behavior  · Withdrawal from people/activities  they love  · Confusion- inability to concentrate     If you or a loved one observes any of these behaviors or has concerns about self-harm, here's what you can do:  · Talk about it- your feelings and reasons for harming yourself  · Remove any means that you might use to hurt yourself (examples: pills, rope, extension cords, firearm)  · Get professional help from the community (Mental Health, Substance Abuse, psychological counseling)  · Do not be alone:Call your Safe Contact- someone whom you trust who will be there for you.  · Call your local CRISIS HOTLINE 651-9007 or 587-450-4861  · Call your local Children's Mobile Crisis Response Team [place] (204) 372-3071 or www.[employer]  · Call the toll free National Suicide Prevention Hotlines   · National Suicide Prevention Lifeline 964-428-CWBS (6985)  · National Hope Line Network 800-SUICIDE (724-2764)

## 2017-05-17 NOTE — IP AVS SNAPSHOT
5/17/2017    Nneka Ruiz  [address]  [place] [address]    Dear Nneka:    [facility] wants to ensure your discharge home is safe and you or your loved ones have had all of your questions answered regarding your care after you leave the hospital.    Below is a list of resources and contact information should you have any questions regarding your hospital stay, follow-up instructions, or active medical symptoms.    Questions or Concerns Regarding… Contact   Medical Questions Related to Your Discharge  (7 days a week, 8am-5pm) Contact a Nurse Care Coordinator   [phone]   Medical Questions Not Related to Your Discharge  (24 hours a day / 7 days a week)  Contact the Nurse Health Line   [phone]    Medications or Discharge Instructions Refer to your discharge packet   or contact your [facility] Primary Care Provider   [phone]   Follow-up Appointment(s) Schedule your appointment via [employer]   or contact Scheduling [phone]   Billing Review your statement via [employer]  or contact Billing [phone]   Medical Records Review your records via [employer]   or contact Medical Records [phone]     You may receive a telephone call within two days of discharge. This call is to make certain you understand your discharge instructions and have the opportunity to have any questions answered. You can also easily access your medical information, test results and upcoming appointments via the Comeet free online health management tool. You can learn more and sign up at [email or web address]. For assistance setting up your Comeet account, please call [phone].    Once again, we want to ensure your discharge home is safe and that you have a clear understanding of any next steps in your care. If you have any questions or concerns, please do not hesitate to contact us, we are here for you. Thank you for choosing [facility] for your healthcare needs.    Sincerely,    Your [facility] Healthcare Team

## 2017-05-17 NOTE — OR NURSING
Patient denies pain at this time. VSS. Patient tolerating water and ice chips without difficulty. Report called to Jacqueline POWERS. Patient transferred to  23

## 2017-05-17 NOTE — OR NURSING
Received from pacu at 853.  Vss. Alert and oriented times three.  Dc instructions given verbalized understanding.  Wanting to walk out.  Balance steady, walked to door.

## 2017-05-17 NOTE — OP REPORT
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 female with a history of dyspepsia, prior sleeve gastrectomy. She is taken to the operating room today for Esophagogastroduodenoscopy and dilatation for suspected stenosis.     DETAILS OF PROCEDURE: After an extensive informed consent discussion and   process, the patient was brought to the operating room and was placed in a   supine position on the endoscopy table. The patient was then given general anesthesia and endotracheal tube intubation. During the course of the procedure, the patient was monitored continuously with pulse oximetry, telemetry, and intermittent blood pressure readings.     After placement of the oral bite block to protect the teeth, gums, and gastroscope, the gastroscope was slowly introduced and advanced into the esophagus. It was slowly advanced down to the gastroesophageal junction region. The GE junction appeared normal. The gastroscope passed without difficulty into the proximal body of the stomach, which appeared normal and consistent with sleeve resection.     A mild stenosis was present at the proximal sleeve. The gastroscope was then advanced into the duodenum without difficulty. The first, second, and third portions of the duodenum appeared normal with no evidence of duodenitis or ulcers. Slowly, the gastroscope was withdrawn, and the duodenum appeared normal. The antrum appeared normal. A retroflexion view was not possible to be safely performed. The  guidewire was advanced into the duodenum. The 30mm pneumatic balloon was selected and the wire-guided system was advanced down to straddle the stenosis. The stenosis was then successfully dilated to 30mm, for 3 minutes each at two different stations . After the balloon was deflated and withdrawn, the diagnostic endoscope was introduced and the area was inspected and showed trace blood and no evidence of untoward events. The stenosis appeared successfully dilated.     The gastroscope was slowly withdrawn as air was aspirated and the area of the proximal pouch and gastroesophageal junction region were again carefully inspected, which all appeared normal. The gastroesophageal junction was carefully inspected as was the esophagus as we withdrew the gastroscope and these areas appeared normal and were photographed for documentation purposes. The gastroscope was then withdrawn.    IMPRESSION/PLAN: Mild stenosis at proximal, dilated to 30mm.   May need repeat dilatation in coming weeks. Continue PPI.    The patient tolerated the procedure well and there were no apparent complications.  She was awakened and transferred to the recovery area in satisfactory condition.       ____________________________________   Keegan Hernandez MD  DD: 5/17/2017  8:15 AM    CC:  [facility];

## 2017-05-24 ENCOUNTER — OFFICE VISIT (OUTPATIENT)
Dept: URGENT CARE | Facility: PHYSICIAN GROUP | Age: 47
End: 2017-05-24
Payer: COMMERCIAL

## 2017-05-24 VITALS
TEMPERATURE: 96.6 F | RESPIRATION RATE: 16 BRPM | DIASTOLIC BLOOD PRESSURE: 80 MMHG | SYSTOLIC BLOOD PRESSURE: 126 MMHG | OXYGEN SATURATION: 98 % | BODY MASS INDEX: 48.82 KG/M2 | HEIGHT: 65 IN | WEIGHT: 293 LBS | HEART RATE: 78 BPM

## 2017-05-24 DIAGNOSIS — J98.01 ACUTE BRONCHOSPASM: ICD-10-CM

## 2017-05-24 DIAGNOSIS — J01.90 ACUTE RHINOSINUSITIS: ICD-10-CM

## 2017-05-24 DIAGNOSIS — M79.89 LEFT LEG SWELLING: ICD-10-CM

## 2017-05-24 PROCEDURE — 99214 OFFICE O/P EST MOD 30 MIN: CPT | Performed by: EMERGENCY MEDICINE

## 2017-05-24 RX ORDER — DOXYCYCLINE HYCLATE 100 MG
100 TABLET ORAL 2 TIMES DAILY
Qty: 14 TAB | Refills: 0 | Status: SHIPPED | OUTPATIENT
Start: 2017-05-24 | End: 2017-12-20

## 2017-05-24 ASSESSMENT — ENCOUNTER SYMPTOMS
HEMOPTYSIS: 0
HEADACHES: 1
SINUS PAIN: 1
COUGH: 1
JOINT SWELLING: 0
SORE THROAT: 0
NUMBNESS: 0
DIARRHEA: 0
WEAKNESS: 0
FEVER: 0
WHEEZING: 1
NAUSEA: 0
VOMITING: 0
LEG SWELLING: 1
CHILLS: 0
SPUTUM PRODUCTION: 1
SHORTNESS OF BREATH: 0

## 2017-05-25 ENCOUNTER — HOSPITAL ENCOUNTER (OUTPATIENT)
Dept: LAB | Facility: MEDICAL CENTER | Age: 47
End: 2017-05-25
Attending: EMERGENCY MEDICINE
Payer: COMMERCIAL

## 2017-05-25 DIAGNOSIS — M79.89 LEFT LEG SWELLING: ICD-10-CM

## 2017-05-25 LAB — DEPRECATED D DIMER PPP IA-ACNC: 219 NG/ML(D-DU)

## 2017-05-25 PROCEDURE — 85379 FIBRIN DEGRADATION QUANT: CPT

## 2017-05-25 PROCEDURE — 36415 COLL VENOUS BLD VENIPUNCTURE: CPT

## 2017-05-25 NOTE — PROGRESS NOTES
Subjective:      Nneka Ruiz is a 46 y.o. female who presents with Sinus Pain            Sinus Pain  This is a new problem. Episode onset: over one week. The problem has been rapidly worsening. Associated symptoms include congestion, coughing and headaches. Pertinent negatives include no chest pain, chills, fever, joint swelling, nausea, numbness, rash, sore throat, vomiting or weakness.   Leg Swelling  This is a recurrent problem. The current episode started today. The problem has been unchanged. Associated symptoms include congestion, coughing and headaches. Pertinent negatives include no chest pain, chills, fever, joint swelling, nausea, numbness, rash, sore throat, vomiting or weakness. She has tried nothing for the symptoms.    notes recent episode of ear infection treated with Augmentin; ear symptoms improved. No nasal congestion, increasing sinus pressure, coughing worsening over one week. F/U with ENT per PCP. Known problems with left knee, today noted left distal leg swelling. No trauma. Last week had previous surgical procedure, otherwise no recent prolonged immobilization.   Review of Systems   Constitutional: Negative for fever, chills and malaise/fatigue.   HENT: Positive for congestion and nosebleeds. Negative for ear discharge, ear pain and sore throat.    Respiratory: Positive for cough, sputum production and wheezing. Negative for hemoptysis and shortness of breath.    Cardiovascular: Negative for chest pain.   Gastrointestinal: Negative for nausea, vomiting and diarrhea.   Musculoskeletal: Negative for joint swelling.   Skin: Negative for rash.   Neurological: Positive for headaches. Negative for weakness and numbness.   Endo/Heme/Allergies: Negative for environmental allergies.     PMH:  has a past medical history of Pain (2014); Anxiety; Anemia in pregnancy; Hypertension; Dental disorder; Psychiatric problem; and Cold. She also has no past medical history of COPD, CAD (coronary artery  disease), or Liver disease.  MEDS:   Current outpatient prescriptions:   •  doxycycline (VIBRAMYCIN) 100 MG Tab, Take 1 Tab by mouth 2 times a day., Disp: 14 Tab, Rfl: 0  •  Albuterol Sulfate 108 (90 BASE) MCG/ACT AEROSOL POWDER, BREATH ACTIVATED, Inhale 1-2 Puffs by mouth every 6 hours as needed (coughing, wheezing)., Disp: 1 Each, Rfl: 0  •  multivitamin (THERAGRAN) Tab, Take 1 Tab by mouth every day., Disp: , Rfl:   •  sucralfate (CARAFATE) 1 GM/10ML Suspension, Take 10 mL by mouth 4 times a day for 31 days., Disp: 1240 mL, Rfl: 0  •  omeprazole (PRILOSEC) 40 MG delayed-release capsule, Take 1 Cap by mouth every day., Disp: 30 Cap, Rfl: 3  •  lisinopril-hydrochlorothiazide (PRINZIDE, ZESTORETIC) 20-12.5 MG per tablet, Take 1 Tab by mouth every morning., Disp: , Rfl:   •  citalopram (CELEXA) 20 MG Tab, Take 20 mg by mouth every morning., Disp: , Rfl:   •  acetaminophen (TYLENOL) 500 MG Tab, Take 500-1,000 mg by mouth every 6 hours as needed., Disp: , Rfl:   •  ofloxacin otic sol (FLOXIN OTIC) 0.3 % Solution, Place 5 Drops in ear 2 times a day. (Patient not taking: Reported on 5/24/2017), Disp: 1 Bottle, Rfl: 1  •  hydrocodone-acetaminophen (NORCO) 7.5-325 MG per tablet, Take 1-2 Tabs by mouth every 6 hours as needed. (Patient not taking: Reported on 5/24/2017), Disp: 10 Tab, Rfl: 0  ALLERGIES:   Allergies   Allergen Reactions   • Aspirin Vomiting   • Other Misc      Waterproof band aids took off skin and caused infection      SURGHX:   Past Surgical History   Procedure Laterality Date   • Tubal ligation     • Tonsillectomy     • Tympanoplasty  11/6/2012     Performed by Jonn Kaufman M.D. at SURGERY SAME DAY [facility] ORS   • Knee arthroscopy  12/10/2014     Performed by Mikel Spencer M.D. at [facility] ORS   • Medial meniscectomy  12/10/2014     Performed by Mikel Spencer M.D. at [facility]   • Meniscectomy  12/10/2014     Performed by Mikel Spencer M.D. at [facility]   • Lateral release  12/10/2014     Performed by Mikel Spencer M.D. at SURGERY [facility]   • Gastric sleeve laparoscopy N/A 2/3/2016     Procedure: GASTRIC SLEEVE LAPAROSCOPY AND LIVER BIOPSY;  Surgeon: Keegan Hernandez M.D.;  Location: SURGERY [facility];  Service:    • Gastroscopy  5/17/2017     Procedure: GASTROSCOPY W/DILATATION, 30-MM ACHALASIA;  Surgeon: Keegan Hernandez M.D.;  Location: SURGERY [facility];  Service:      SOCHX:  reports that she quit smoking about 2 years ago. Her smoking use included Cigarettes. She has a 12 pack-year smoking history. She has never used smokeless tobacco. She reports that she drinks alcohol. She reports that she does not use illicit drugs.  FH: family history is not on file.       Objective:     /80 mmHg  Pulse 78  Temp(Src) 35.9 °C (96.6 °F)  Resp 16  Ht 1.651 m (5' 5\")  Wt 147.419 kg (325 lb)  BMI 54.08 kg/m2  SpO2 98%  LMP 01/01/2012     Physical Exam   Constitutional: She is oriented to person, place, and time. She appears well-developed and well-nourished. She is cooperative. She does not appear ill. No distress.   HENT:   Right Ear: Tympanic membrane, external ear and ear canal normal. No mastoid tenderness. Tympanic membrane is not injected.   Left Ear: Ear canal normal. No mastoid tenderness. Tympanic membrane is scarred. Tympanic membrane is not injected.   Nose: Mucosal edema and rhinorrhea present. Right sinus exhibits maxillary sinus tenderness and frontal sinus tenderness. Left sinus exhibits maxillary sinus tenderness and frontal sinus tenderness.   Mouth/Throat: Uvula is midline and oropharynx is clear and moist.   Eyes: Conjunctivae are normal.   Neck: Trachea normal. Neck supple.   Cardiovascular: Normal rate, regular rhythm and normal heart sounds.    Pulmonary/Chest: Effort normal. She has no decreased breath sounds. She has no wheezes. She has no rhonchi. She has no rales.   Wheezy cough noted   Musculoskeletal: "        Right lower leg: She exhibits swelling and edema. She exhibits no tenderness, no bony tenderness and no deformity.   No calf tenderness, Homans sign negative.   Lymphadenopathy:     She has no cervical adenopathy.   Neurological: She is alert and oriented to person, place, and time.   Skin: Skin is warm, dry and intact. No rash noted.   Psychiatric: She has a normal mood and affect.          May have a viral illness; due to duration and history of recent tympanic membrane rupture events, we'll cover with antibiotic different then prior Augmentin.     Assessment/Plan:     1. Acute rhinosinusitis  - doxycycline (VIBRAMYCIN) 100 MG Tab; Take 1 Tab by mouth 2 times a day.  Dispense: 14 Tab; Refill: 0  Recommended supportive care measures, including rest, increasing oral fluid intake and use of over-the-counter medications for relief of symptoms.    2. Acute bronchospasm  - Albuterol Sulfate 108 (90 BASE) MCG/ACT AEROSOL POWDER, BREATH ACTIVATED; Inhale 1-2 Puffs by mouth every 6 hours as needed (coughing, wheezing).  Dispense: 1 Each; Refill: 0    3. Left leg swelling  Low suspicion DVT  - D-DIMER; Future

## 2017-05-26 ENCOUNTER — TELEPHONE (OUTPATIENT)
Dept: URGENT CARE | Facility: PHYSICIAN GROUP | Age: 47
End: 2017-05-26

## 2017-07-14 ENCOUNTER — OFFICE VISIT (OUTPATIENT)
Dept: URGENT CARE | Facility: PHYSICIAN GROUP | Age: 47
End: 2017-07-14
Payer: COMMERCIAL

## 2017-07-14 VITALS
HEIGHT: 65 IN | SYSTOLIC BLOOD PRESSURE: 136 MMHG | RESPIRATION RATE: 12 BRPM | BODY MASS INDEX: 48.82 KG/M2 | TEMPERATURE: 97.8 F | DIASTOLIC BLOOD PRESSURE: 70 MMHG | WEIGHT: 293 LBS | OXYGEN SATURATION: 92 % | HEART RATE: 76 BPM

## 2017-07-14 DIAGNOSIS — H66.001 ACUTE SUPPURATIVE OTITIS MEDIA OF RIGHT EAR WITHOUT SPONTANEOUS RUPTURE OF TYMPANIC MEMBRANE, RECURRENCE NOT SPECIFIED: ICD-10-CM

## 2017-07-14 DIAGNOSIS — J31.0 RHINITIS, UNSPECIFIED TYPE: ICD-10-CM

## 2017-07-14 DIAGNOSIS — B37.9 ANTIBIOTIC-INDUCED YEAST INFECTION: ICD-10-CM

## 2017-07-14 DIAGNOSIS — T36.95XA ANTIBIOTIC-INDUCED YEAST INFECTION: ICD-10-CM

## 2017-07-14 PROCEDURE — 99214 OFFICE O/P EST MOD 30 MIN: CPT | Performed by: PHYSICIAN ASSISTANT

## 2017-07-14 RX ORDER — FLUCONAZOLE 150 MG/1
150 TABLET ORAL ONCE
Qty: 1 TAB | Refills: 0 | Status: SHIPPED | OUTPATIENT
Start: 2017-07-14 | End: 2017-07-14

## 2017-07-14 RX ORDER — TRAMADOL HYDROCHLORIDE 50 MG/1
50 TABLET ORAL EVERY 6 HOURS PRN
Qty: 20 TAB | Refills: 0 | Status: SHIPPED | OUTPATIENT
Start: 2017-07-14 | End: 2017-12-20

## 2017-07-14 RX ORDER — FLUTICASONE PROPIONATE 50 MCG
1 SPRAY, SUSPENSION (ML) NASAL 2 TIMES DAILY
Qty: 1 BOTTLE | Refills: 0 | Status: SHIPPED | OUTPATIENT
Start: 2017-07-14 | End: 2017-12-20

## 2017-07-14 RX ORDER — AMOXICILLIN AND CLAVULANATE POTASSIUM 875; 125 MG/1; MG/1
1 TABLET, FILM COATED ORAL 2 TIMES DAILY
Qty: 20 TAB | Refills: 0 | Status: SHIPPED | OUTPATIENT
Start: 2017-07-14 | End: 2017-12-20

## 2017-07-14 ASSESSMENT — PAIN SCALES - GENERAL: PAINLEVEL: 5=MODERATE PAIN

## 2017-07-14 NOTE — PROGRESS NOTES
Chief Complaint   Patient presents with   • Otalgia     x today / RT ear / sinus porblem       HISTORY OF PRESENT ILLNESS: Patient is a 46 y.o. female who presents today for evaluation of ear pain that started today. Patient had a dry cough that started last night with a small amount of postnasal drip. Her right ear started hurting around 12:00 today. She denies any drainage from her ear but complains of severe pain. She has been taking acetaminophen with very little relief of her pain. She denies any fevers. She has a history of tympanic membrane rupture with reconstruction of the left side.    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 20 Tab 0   • amoxicillin-clavulanate (AUGMENTIN) 875-125 MG Tab Take 1 Tab by mouth 2 times a day. 20 Tab 0   • fluconazole (DIFLUCAN) 150 MG tablet Take 1 Tab by mouth Once for 1 dose. 1 Tab 0   • Albuterol Sulfate 108 (90 BASE) MCG/ACT AEROSOL POWDER, BREATH ACTIVATED Inhale 1-2 Puffs by mouth every 6 hours as needed (coughing, wheezing). 1 Each 0   • multivitamin (THERAGRAN) Tab Take 1 Tab by mouth every day.     • omeprazole (PRILOSEC) 40 MG delayed-release capsule Take 1 Cap by mouth every day. 30 Cap 3   • lisinopril-hydrochlorothiazide (PRINZIDE, ZESTORETIC) 20-12.5 MG per tablet Take 1 Tab by mouth every morning.     • citalopram (CELEXA) 20 MG Tab Take 20 mg by mouth every morning.     • doxycycline (VIBRAMYCIN) 100 MG Tab Take 1 Tab by mouth 2  "times a day. 14 Tab 0   • ofloxacin otic sol (FLOXIN OTIC) 0.3 % Solution Place 5 Drops in ear 2 times a day. (Patient not taking: Reported on 5/24/2017) 1 Bottle 1   • hydrocodone-acetaminophen (NORCO) 7.5-325 MG per tablet Take 1-2 Tabs by mouth every 6 hours as needed. (Patient not taking: Reported on 5/24/2017) 10 Tab 0   • acetaminophen (TYLENOL) 500 MG Tab Take 500-1,000 mg by mouth every 6 hours as needed.       No current Epic-ordered facility-administered medications on file.       Past Medical History   Diagnosis Date   • Pain 2014     left knee   • Anxiety    • Anemia in pregnancy    • Hypertension    • Dental disorder      lower partial   • Psychiatric problem      anxiety   • Cold        Social History   Substance Use Topics   • Smoking status: Former Smoker -- 1.00 packs/day for 12 years     Types: Cigarettes     Quit date: 12/01/2014   • Smokeless tobacco: Never Used      Comment: 2 yrs  1/2 ppd   • Alcohol Use: 0.0 oz/week     0 Standard drinks or equivalent per week      Comment: 0-2 drinks per month       Family Status   Relation Status Death Age   • Mother Alive    • Father Alive    No family history on file.    ROS:    Review of Systems   Constitutional: Negative for fever, chills, weight loss and malaise/fatigue.   HENT: Negative for nosebleeds, congestion, sore throat and neck pain.    Eyes: Negative for blurred vision.   Respiratory: Negative for sputum production, shortness of breath and wheezing.    Cardiovascular: Negative for chest pain, palpitations, orthopnea and leg swelling.   Gastrointestinal: Negative for heartburn, nausea, vomiting and abdominal pain.   Genitourinary: Negative for dysuria, urgency and frequency.       Exam:  Blood pressure 136/70, pulse 76, temperature 36.6 °C (97.8 °F), resp. rate 12, height 1.651 m (5' 5\"), weight 149.415 kg (329 lb 6.4 oz), SpO2 92 %.  General: Normal appearing. No distress.  HEENT: Conjunctiva clear, lids without ptosis, ears normal shape and " contour, canals are clear bilaterally, nasal mucosa edematous bilaterally, oropharynx is without erythema, edema or exudates. Right TM with purulent effusion, loss of landmarks, and significant bulging.  Pulmonary: Clear to ausculation and percussion.  Normal effort. No rales, ronchi, or wheezing.   Cardiovascular: Regular rate and rhythm without murmur.   Neurologic: Grossly nonfocal.  Lymph: No cervical lymphadenopathy noted.  Skin: No obvious lesions.  Psych: Normal mood. Alert and oriented x3. Judgment and insight is normal.    Assessment/Plan  Take all medications as directed. Follow-up for worsening or persistent symptoms. Over-the-counter symptomatic medication. Patient has a history of a gastric sleeve surgery therefore should not be taking anti-inflammatories.  1. Acute suppurative otitis media of right ear without spontaneous rupture of tympanic membrane, recurrence not specified  tramadol (ULTRAM) 50 MG Tab    amoxicillin-clavulanate (AUGMENTIN) 875-125 MG Tab   2. Antibiotic-induced yeast infection  fluconazole (DIFLUCAN) 150 MG tablet   3. Rhinitis, unspecified type  fluticasone (FLONASE) 50 MCG/ACT nasal spray

## 2017-07-14 NOTE — MR AVS SNAPSHOT
"        Nneka Ruiz   2017 3:35 PM   Office Visit   MRN: [id number]    Department:  [facility]   Dept Phone:  [phone]    Description:  Female : 1970   Provider:  Elda Giron PA-C           Reason for Visit     Otalgia x today / RT ear / sinus porblem      Allergies as of 2017     Allergen Noted Reactions    Aspirin 2007   Vomiting    Other Misc 2016       Waterproof band aids took off skin and caused infection       You were diagnosed with     Acute suppurative otitis media of right ear without spontaneous rupture of tympanic membrane, recurrence not specified   [3522156]       Antibiotic-induced yeast infection   [814329]       Rhinitis, unspecified type   [[id number]]         Vital Signs     Blood Pressure Pulse Temperature Respirations Height Weight    136/70 mmHg 76 36.6 °C (97.8 °F) 12 1.651 m (5' 5\") 149.415 kg (329 lb 6.4 oz)    Body Mass Index Oxygen Saturation Smoking Status             54.82 kg/m2 92% Former Smoker         Basic Information     Date Of Birth Sex Race Ethnicity Preferred Language    1970 Female White Non- English      Problem List              ICD-10-CM Priority Class Noted - Resolved    Ruptured ear drum H72.90   2012 - Present    Depression F32.9   2012 - Present    Pedal edema R60.0   2012 - Present    Obesity E66.9   2012 - Present    Central perforation of tympanic membrane H72.00   2012 - Present    Tear of medial cartilage or meniscus of knee, current [id number]   12/10/2014 - Present    Morbid obesity (CMS-HCC) E66.01   2/3/2016 - Present    Stenosis of esophagus K22.2   2017 - Present      Health Maintenance        Date Due Completion Dates    IMM DTaP/Tdap/Td Vaccine (1 - Tdap) 1991 ---    PAP SMEAR 1993 ---    MAMMOGRAM 2012 ---    IMM INFLUENZA (1) 2015            Current Immunizations     Influenza Vaccine Adult HD 2015    Pertussis Vaccine " 12/13/2015      Below and/or attached are the medications your provider expects you to take. Review all of your home medications and newly ordered medications with your provider and/or pharmacist. Follow medication instructions as directed by your provider and/or pharmacist. Please keep your medication list with you and share with your provider. Update the information when medications are discontinued, doses are changed, or new medications (including over-the-counter products) are added; and carry medication information at all times in the event of emergency situations     Allergies:  ASPIRIN - Vomiting     OTHER MISC - (reactions not documented)               Medications  Valid as of: July 14, 2017 -  4:45 PM    Generic Name Brand Name Tablet Size Instructions for use    Acetaminophen (Tab) TYLENOL 500 MG Take 500-1,000 mg by mouth every 6 hours as needed.        Albuterol Sulfate (AEROSOL POWDER, BREATH ACTIVATED) Albuterol Sulfate 108 (90 BASE) MCG/ACT Inhale 1-2 Puffs by mouth every 6 hours as needed (coughing, wheezing).        Amoxicillin-Pot Clavulanate (Tab) AUGMENTIN 875-125 MG Take 1 Tab by mouth 2 times a day.        Citalopram Hydrobromide (Tab) CELEXA 20 MG Take 20 mg by mouth every morning.        Doxycycline Hyclate (Tab) VIBRAMYCIN 100 MG Take 1 Tab by mouth 2 times a day.        Fluconazole (Tab) DIFLUCAN 150 MG Take 1 Tab by mouth Once for 1 dose.        Fluticasone Propionate (Suspension) FLONASE 50 MCG/ACT Spray 1 Spray in nose 2 times a day.        Hydrocodone-Acetaminophen (Tab) NORCO 7.5-325 MG Take 1-2 Tabs by mouth every 6 hours as needed.        Lisinopril-Hydrochlorothiazide (Tab) PRINZIDE, ZESTORETIC 20-12.5 MG Take 1 Tab by mouth every morning.        Multiple Vitamin (Tab) THERAGRAN  Take 1 Tab by mouth every day.        Ofloxacin (Solution) FLOXIN OTIC 0.3 % Place 5 Drops in ear 2 times a day.        Omeprazole (CAPSULE DELAYED RELEASE) PRILOSEC 40 MG Take 1 Cap by mouth every day.         TraMADol HCl (Tab) ULTRAM 50 MG Take 1 Tab by mouth every 6 hours as needed for Mild Pain.        .                 Medicines prescribed today were sent to:     [id number] - [place], [place] - [address] [facility] [address]    [address] [facility] [address] [place] [address]    Phone: [phone] Fax: [phone]    Open 24 Hours?: No      Medication refill instructions:       If your prescription bottle indicates you have medication refills left, it is not necessary to call your provider’s office. Please contact your pharmacy and they will refill your medication.    If your prescription bottle indicates you do not have any refills left, you may request refills at any time through one of the following ways: The online [employer] system (except Urgent Care), by calling your provider’s office, or by asking your pharmacy to contact your provider’s office with a refill request. Medication refills are processed only during regular business hours and may not be available until the next business day. Your provider may request additional information or to have a follow-up visit with you prior to refilling your medication.   *Please Note: Medication refills are assigned a new Rx number when refilled electronically. Your pharmacy may indicate that no refills were authorized even though a new prescription for the same medication is available at the pharmacy. Please request the medicine by name with the pharmacy before contacting your provider for a refill.           [employer] Access Code: Activation code not generated  Current [employer] Status: Active

## 2017-10-04 ENCOUNTER — OFFICE VISIT (OUTPATIENT)
Dept: URGENT CARE | Facility: PHYSICIAN GROUP | Age: 47
End: 2017-10-04
Payer: COMMERCIAL

## 2017-10-04 VITALS
HEIGHT: 65 IN | WEIGHT: 293 LBS | DIASTOLIC BLOOD PRESSURE: 80 MMHG | TEMPERATURE: 98.5 F | BODY MASS INDEX: 48.82 KG/M2 | HEART RATE: 77 BPM | SYSTOLIC BLOOD PRESSURE: 130 MMHG | RESPIRATION RATE: 16 BRPM | OXYGEN SATURATION: 96 %

## 2017-10-04 DIAGNOSIS — B37.9 ANTIBIOTIC-INDUCED YEAST INFECTION: ICD-10-CM

## 2017-10-04 DIAGNOSIS — H66.41 SUPPURATIVE OTITIS MEDIA OF RIGHT EAR, UNSPECIFIED CHRONICITY: ICD-10-CM

## 2017-10-04 DIAGNOSIS — T36.95XA ANTIBIOTIC-INDUCED YEAST INFECTION: ICD-10-CM

## 2017-10-04 PROCEDURE — 99214 OFFICE O/P EST MOD 30 MIN: CPT | Performed by: PHYSICIAN ASSISTANT

## 2017-10-04 RX ORDER — TRAMADOL HYDROCHLORIDE 50 MG/1
50 TABLET ORAL EVERY 8 HOURS PRN
Qty: 10 TAB | Refills: 0 | Status: SHIPPED | OUTPATIENT
Start: 2017-10-04 | End: 2017-12-20

## 2017-10-04 RX ORDER — FLUCONAZOLE 100 MG/1
200 TABLET ORAL DAILY
Qty: 1 TAB | Refills: 0 | Status: SHIPPED | OUTPATIENT
Start: 2017-10-04 | End: 2017-12-20

## 2017-10-04 RX ORDER — AMOXICILLIN AND CLAVULANATE POTASSIUM 875; 125 MG/1; MG/1
1 TABLET, FILM COATED ORAL 2 TIMES DAILY
Qty: 14 TAB | Refills: 0 | Status: SHIPPED | OUTPATIENT
Start: 2017-10-04 | End: 2017-10-11

## 2017-10-04 ASSESSMENT — ENCOUNTER SYMPTOMS
WHEEZING: 0
EYE REDNESS: 0
SORE THROAT: 0
ABDOMINAL PAIN: 0
FEVER: 0
DIZZINESS: 0
EYE DISCHARGE: 0
DIARRHEA: 0
CONSTIPATION: 0
TINGLING: 0
NECK PAIN: 0
CHILLS: 0
COUGH: 0

## 2017-10-04 ASSESSMENT — PAIN SCALES - GENERAL: PAINLEVEL: 8=MODERATE-SEVERE PAIN

## 2017-10-04 NOTE — PROGRESS NOTES
"Subjective:      Nneka Ruiz is a 47 y.o. female who presents with Otalgia (Right ear pain and drainage. )            Pt is 48 y/o female with hx. Of recurrent infections to both her ears- currently has an ET tube to her right TM after recurrent infection and perforation. She recently saw her ENT of which wrote her Cipro drops for her ear, of which at first it was helping a little, but now the discharge is worsening and pain is worsening. She reports hx of same and has needed an oral ABX after failing the drops. She denies any pain to the outside of the ear, only \"on the inside\". She also reports hx of yeast infections with ABX.   She has hx of Gastric sleeve and is unable to utilize NSAIDs.       Otalgia    There is pain in the right ear. This is a recurrent problem. The current episode started in the past 7 days. The problem occurs constantly. The problem has been gradually worsening. There has been no fever. The pain is at a severity of 5/10. The pain is moderate. Associated symptoms include ear discharge and hearing loss. Pertinent negatives include no abdominal pain, coughing, diarrhea, neck pain, rash or sore throat. She has tried heat packs and ear drops for the symptoms. The treatment provided mild relief. Her past medical history is significant for a chronic ear infection, hearing loss and a tympanostomy tube.       Review of Systems   Constitutional: Negative for chills, fever and malaise/fatigue.   HENT: Positive for ear discharge, ear pain and hearing loss. Negative for congestion and sore throat.    Eyes: Negative for discharge and redness.   Respiratory: Negative for cough and wheezing.    Cardiovascular: Negative for chest pain and leg swelling.   Gastrointestinal: Negative for abdominal pain, constipation and diarrhea.   Musculoskeletal: Negative for neck pain.   Skin: Negative for itching and rash.   Neurological: Negative for dizziness and tingling.          Objective:     /80   " "Pulse 77   Temp 36.9 °C (98.5 °F)   Resp 16   Ht 1.651 m (5' 5\")   Wt (!) 150.1 kg (331 lb)   SpO2 96%   BMI 55.08 kg/m²    PMH:  has a past medical history of Anemia in pregnancy; Anxiety; Cold; Dental disorder; Hypertension; Pain (2014); and Psychiatric problem. She also has no past medical history of CAD (coronary artery disease); COPD; or Liver disease.  MEDS:   Current Outpatient Prescriptions:   •  tramadol (ULTRAM) 50 MG Tab, Take 1 Tab by mouth every 8 hours as needed for Moderate Pain., Disp: 10 Tab, Rfl: 0  •  amoxicillin-clavulanate (AUGMENTIN) 875-125 MG Tab, Take 1 Tab by mouth 2 times a day for 7 days., Disp: 14 Tab, Rfl: 0  •  fluconazole (DIFLUCAN) 100 MG Tab, Take 2 Tabs by mouth every day., Disp: 1 Tab, Rfl: 0  •  multivitamin (THERAGRAN) Tab, Take 1 Tab by mouth every day., Disp: , Rfl:   •  omeprazole (PRILOSEC) 40 MG delayed-release capsule, Take 1 Cap by mouth every day., Disp: 30 Cap, Rfl: 3  •  lisinopril-hydrochlorothiazide (PRINZIDE, ZESTORETIC) 20-12.5 MG per tablet, Take 1 Tab by mouth every morning., Disp: , Rfl:   •  citalopram (CELEXA) 20 MG Tab, Take 20 mg by mouth every morning., Disp: , Rfl:   •  acetaminophen (TYLENOL) 500 MG Tab, Take 500-1,000 mg by mouth every 6 hours as needed., Disp: , Rfl:   •  tramadol (ULTRAM) 50 MG Tab, Take 1 Tab by mouth every 6 hours as needed for Mild Pain., Disp: 20 Tab, Rfl: 0  •  amoxicillin-clavulanate (AUGMENTIN) 875-125 MG Tab, Take 1 Tab by mouth 2 times a day., Disp: 20 Tab, Rfl: 0  •  fluticasone (FLONASE) 50 MCG/ACT nasal spray, Spray 1 Spray in nose 2 times a day., Disp: 1 Bottle, Rfl: 0  •  doxycycline (VIBRAMYCIN) 100 MG Tab, Take 1 Tab by mouth 2 times a day., Disp: 14 Tab, Rfl: 0  •  Albuterol Sulfate 108 (90 BASE) MCG/ACT AEROSOL POWDER, BREATH ACTIVATED, Inhale 1-2 Puffs by mouth every 6 hours as needed (coughing, wheezing)., Disp: 1 Each, Rfl: 0  •  ofloxacin otic sol (FLOXIN OTIC) 0.3 % Solution, Place 5 Drops in ear 2 times a " day. (Patient not taking: Reported on 5/24/2017), Disp: 1 Bottle, Rfl: 1  •  hydrocodone-acetaminophen (NORCO) 7.5-325 MG per tablet, Take 1-2 Tabs by mouth every 6 hours as needed. (Patient not taking: Reported on 5/24/2017), Disp: 10 Tab, Rfl: 0  ALLERGIES:   Allergies   Allergen Reactions   • Aspirin Vomiting   • Other Misc      Waterproof band aids took off skin and caused infection      SURGHX:   Past Surgical History:   Procedure Laterality Date   • GASTROSCOPY  5/17/2017    Procedure: GASTROSCOPY W/DILATATION, 30-MM ACHALASIA;  Surgeon: Keegan Hernandez M.D.;  Location: SURGERY [facility];  Service:    • GASTRIC SLEEVE LAPAROSCOPY N/A 2/3/2016    Procedure: GASTRIC SLEEVE LAPAROSCOPY AND LIVER BIOPSY;  Surgeon: Keegan Hernandez M.D.;  Location: SURGERY [facility];  Service:    • KNEE ARTHROSCOPY  12/10/2014    Performed by Mikel Spencer M.D. at [facility]   • MEDIAL MENISCECTOMY  12/10/2014    Performed by Mikel Spencer M.D. at [facility]   • MENISCECTOMY  12/10/2014    Performed by Mikel Spencer M.D. at [facility]   • LATERAL RELEASE  12/10/2014    Performed by Mikel Sepncer M.D. at [facility]   • TYMPANOPLASTY  11/6/2012    Performed by Jonn Kaufman M.D. at SURGERY SAME DAY [facility]   • TONSILLECTOMY     • TUBAL LIGATION       SOCHX:  reports that she quit smoking about 2 years ago. Her smoking use included Cigarettes. She has a 12.00 pack-year smoking history. She has never used smokeless tobacco. She reports that she drinks alcohol. She reports that she does not use drugs.  FH: Family history was reviewed, no pertinent findings to report    Physical Exam   Constitutional: She is oriented to person, place, and time. She appears well-developed and well-nourished.   HENT:   Head: Normocephalic and atraumatic.   Right Ear: There is drainage. Tympanic membrane is erythematous. A middle ear effusion is present.    Ears:    Mouth/Throat: No oropharyngeal exudate.   Tube patent- draining purulent discharge- canal is without edema or noted tenderness. Auricle and tragus nontender. TM is erythematous with noted purulent effusion.        Eyes: EOM are normal. Pupils are equal, round, and reactive to light.   Neck: Normal range of motion. Neck supple.   Cardiovascular: Normal rate and regular rhythm.    No murmur heard.  Pulmonary/Chest: Effort normal and breath sounds normal. No respiratory distress.   Musculoskeletal: Normal range of motion. She exhibits no tenderness.   Lymphadenopathy:     She has no cervical adenopathy.   Neurological: She is alert and oriented to person, place, and time.   Skin: Skin is warm. No rash noted.   Psychiatric: She has a normal mood and affect. Her behavior is normal.   Vitals reviewed.              Assessment/Plan:     1. Suppurative otitis media of right ear, unspecified chronicity  - tramadol (ULTRAM) 50 MG Tab; Take 1 Tab by mouth every 8 hours as needed for Moderate Pain.  Dispense: 10 Tab; Refill: 0  - amoxicillin-clavulanate (AUGMENTIN) 875-125 MG Tab; Take 1 Tab by mouth 2 times a day for 7 days.  Dispense: 14 Tab; Refill: 0  - fluconazole (DIFLUCAN) 100 MG Tab; Take 2 Tabs by mouth every day.  Dispense: 1 Tab; Refill: 0    2. Antibiotic-induced yeast infection    [place]  was reviewed by myself-  Document  does not reveal any concerning patterns. Pt. was advised to avoid the operation of heavy machine along with driving while on such medications. Finally pt. was advised to use medication only as prescribed. Tramadol was written as she has had great improvement with this in the past- denies any hx of sz. Disorder.   She is to continue with the Cipro drops and I will start her on Augmentin for OM- she is to avoid head submersion.   Patient given precautionary s/sx that mandate immediate follow up and evaluation in the ED. Advised of risks of not doing so.    DDX, Supportive care, and  indications for immediate follow-up discussed with patient.    Instructed to return to clinic or nearest emergency department if we are not available for any change in condition, further concerns, or worsening of symptoms.    The patient demonstrated a good understanding and agreed with the treatment plan.

## 2017-11-05 ENCOUNTER — OFFICE VISIT (OUTPATIENT)
Dept: URGENT CARE | Facility: PHYSICIAN GROUP | Age: 47
End: 2017-11-05
Payer: COMMERCIAL

## 2017-11-05 VITALS
RESPIRATION RATE: 12 BRPM | TEMPERATURE: 99.5 F | HEART RATE: 70 BPM | SYSTOLIC BLOOD PRESSURE: 138 MMHG | OXYGEN SATURATION: 98 % | HEIGHT: 68 IN | WEIGHT: 293 LBS | BODY MASS INDEX: 44.41 KG/M2 | DIASTOLIC BLOOD PRESSURE: 76 MMHG

## 2017-11-05 DIAGNOSIS — F10.930 ALCOHOL WITHDRAWAL SYNDROME WITHOUT COMPLICATION (HCC): Primary | ICD-10-CM

## 2017-11-05 PROCEDURE — 99214 OFFICE O/P EST MOD 30 MIN: CPT | Performed by: NURSE PRACTITIONER

## 2017-11-05 RX ORDER — LORAZEPAM 0.5 MG/1
0.5 TABLET ORAL EVERY 8 HOURS PRN
Qty: 5 TAB | Refills: 0 | Status: SHIPPED | OUTPATIENT
Start: 2017-11-05 | End: 2017-12-20

## 2017-11-05 ASSESSMENT — ENCOUNTER SYMPTOMS
MYALGIAS: 0
NAUSEA: 0
HEADACHES: 0
SHORTNESS OF BREATH: 0
NERVOUS/ANXIOUS: 1
TREMORS: 0
FEVER: 0
VOMITING: 0
CHILLS: 0

## 2017-11-05 ASSESSMENT — LIFESTYLE VARIABLES: SUBSTANCE_ABUSE: 1

## 2017-11-05 ASSESSMENT — PAIN SCALES - GENERAL: PAINLEVEL: 9=SEVERE PAIN

## 2017-11-05 NOTE — PROGRESS NOTES
"Subjective:      Nneka Ruiz is a 47 y.o. female who presents with Withdrawal (alcohol withdrawl - last drink early saturday morning around 4 am, pt states she was drinking a gallon of vodka a day , today she is experiencing skin crawling, cramping, nausea, head ache, jittery)            Medications, Allergies and Prior Medical Hx reviewed and updated in [facility].with patient/family today       Pt has been drinking large amts of hard alcohol for 6 months, she states half gallon of vodka plus bourbon and hard cider per day. Patient stopped drinking 2 days ago. She is requesting benzodiazepines to help her through withdrawals. She is here with her mother. She states her primary care provider gave her some benzodiazepines when she withdrew in February. She is going to .         Review of Systems   Constitutional: Negative for chills and fever.   Respiratory: Negative for shortness of breath.    Cardiovascular: Negative for chest pain.   Gastrointestinal: Negative for nausea and vomiting.   Musculoskeletal: Negative for myalgias.   Neurological: Negative for tremors and headaches.   Psychiatric/Behavioral: Positive for substance abuse. The patient is nervous/anxious.           Objective:     /76   Pulse 70   Temp 37.5 °C (99.5 °F)   Resp 12   Ht 1.727 m (5' 8\")   Wt (!) 152 kg (335 lb)   SpO2 98%   BMI 50.94 kg/m²      Physical Exam   Constitutional: She appears well-developed and well-nourished. No distress.   HENT:   Head: Normocephalic and atraumatic.   Eyes: Conjunctivae are normal. Pupils are equal, round, and reactive to light.   Neck: Neck supple.   Cardiovascular: Normal rate.    Pulmonary/Chest: Effort normal. No respiratory distress.   Neurological: She is alert.   Awake, alert, answering questions appropriately, moving all extremeties   Skin: Skin is warm and dry. Capillary refill takes less than 2 seconds. No rash noted.   Psychiatric: She has a normal mood and affect. Her behavior is " normal.   Vitals reviewed.              Assessment/Plan:       1. Alcohol withdrawal syndrome without complication (CMS-HCC)  lorazepam (ATIVAN) 0.5 MG Tab       The pharmacy was called and her pcp previous rx 6 mos ago was verified.   Pt was given an rx for #5 - 0.5 ativan we discussed risks of benzodiazepines. The rx was given to mother who states she will be with her thru this w/d.  The pt will f/u with her pcp tomorrow for further meds.     Do not drink alcohol or operate machinery with this medication  Pt reviewed on [place]  Aware,  no remarkable controlled substance prescription documentation noted    Pt will go to the ER for worsening or changing symptoms as discussed,   Follow-up with pcp tomorrow.  Discharge instructions discussed with pt/family who verbalize understanding and agreement with poc    .

## 2017-12-03 ENCOUNTER — OFFICE VISIT (OUTPATIENT)
Dept: URGENT CARE | Facility: PHYSICIAN GROUP | Age: 47
End: 2017-12-03
Payer: COMMERCIAL

## 2017-12-03 ENCOUNTER — HOSPITAL ENCOUNTER (OUTPATIENT)
Facility: MEDICAL CENTER | Age: 47
End: 2017-12-03
Attending: FAMILY MEDICINE
Payer: COMMERCIAL

## 2017-12-03 VITALS
RESPIRATION RATE: 14 BRPM | HEIGHT: 68 IN | DIASTOLIC BLOOD PRESSURE: 78 MMHG | HEART RATE: 90 BPM | BODY MASS INDEX: 44.41 KG/M2 | WEIGHT: 293 LBS | OXYGEN SATURATION: 98 % | SYSTOLIC BLOOD PRESSURE: 128 MMHG | TEMPERATURE: 97.8 F

## 2017-12-03 DIAGNOSIS — M10.9 ACUTE GOUT INVOLVING TOE OF RIGHT FOOT, UNSPECIFIED CAUSE: ICD-10-CM

## 2017-12-03 LAB
ANION GAP SERPL CALC-SCNC: 8 MMOL/L (ref 0–11.9)
BUN SERPL-MCNC: 8 MG/DL (ref 8–22)
CALCIUM SERPL-MCNC: 9.7 MG/DL (ref 8.5–10.5)
CHLORIDE SERPL-SCNC: 103 MMOL/L (ref 96–112)
CO2 SERPL-SCNC: 25 MMOL/L (ref 20–33)
CREAT SERPL-MCNC: 0.55 MG/DL (ref 0.5–1.4)
GFR SERPL CREATININE-BSD FRML MDRD: >60 ML/MIN/1.73 M 2
GLUCOSE SERPL-MCNC: 78 MG/DL (ref 65–99)
POTASSIUM SERPL-SCNC: 4.1 MMOL/L (ref 3.6–5.5)
SODIUM SERPL-SCNC: 136 MMOL/L (ref 135–145)
URATE SERPL-MCNC: 3.5 MG/DL (ref 1.9–8.2)

## 2017-12-03 PROCEDURE — 80048 BASIC METABOLIC PNL TOTAL CA: CPT

## 2017-12-03 PROCEDURE — 84550 ASSAY OF BLOOD/URIC ACID: CPT

## 2017-12-03 PROCEDURE — 99214 OFFICE O/P EST MOD 30 MIN: CPT | Performed by: FAMILY MEDICINE

## 2017-12-03 RX ORDER — PREDNISONE 10 MG/1
40 TABLET ORAL DAILY
Qty: 60 TAB | Refills: 0 | Status: SHIPPED | OUTPATIENT
Start: 2017-12-03 | End: 2017-12-18

## 2017-12-03 ASSESSMENT — PAIN SCALES - GENERAL: PAINLEVEL: 8=MODERATE-SEVERE PAIN

## 2017-12-03 NOTE — PATIENT INSTRUCTIONS
Gout  Gout is an inflammatory arthritis caused by a buildup of uric acid crystals in the joints. Uric acid is a chemical that is normally present in the blood. When the level of uric acid in the blood is too high it can form crystals that deposit in your joints and tissues. This causes joint redness, soreness, and swelling (inflammation). Repeat attacks are common. Over time, uric acid crystals can form into masses (tophi) near a joint, destroying bone and causing disfigurement. Gout is treatable and often preventable.  CAUSES   The disease begins with elevated levels of uric acid in the blood. Uric acid is produced by your body when it breaks down a naturally found substance called purines. Certain foods you eat, such as meats and fish, contain high amounts of purines. Causes of an elevated uric acid level include:  · Being passed down from parent to child (heredity).  · Diseases that cause increased uric acid production (such as obesity, psoriasis, and certain cancers).  · Excessive alcohol use.  · Diet, especially diets rich in meat and seafood.  · Medicines, including certain cancer-fighting medicines (chemotherapy), water pills (diuretics), and aspirin.  · Chronic kidney disease. The kidneys are no longer able to remove uric acid well.  · Problems with metabolism.  Conditions strongly associated with gout include:  · Obesity.  · High blood pressure.  · High cholesterol.  · Diabetes.  Not everyone with elevated uric acid levels gets gout. It is not understood why some people get gout and others do not. Surgery, joint injury, and eating too much of certain foods are some of the factors that can lead to gout attacks.  SYMPTOMS   · An attack of gout comes on quickly. It causes intense pain with redness, swelling, and warmth in a joint.  · Fever can occur.  · Often, only one joint is involved. Certain joints are more commonly involved:  ¨ Base of the big toe.  ¨ Knee.  ¨ Ankle.  ¨ Wrist.  ¨ Finger.  Without  treatment, an attack usually goes away in a few days to weeks. Between attacks, you usually will not have symptoms, which is different from many other forms of arthritis.  DIAGNOSIS   Your caregiver will suspect gout based on your symptoms and exam. In some cases, tests may be recommended. The tests may include:  · Blood tests.  · Urine tests.  · X-rays.  · Joint fluid exam. This exam requires a needle to remove fluid from the joint (arthrocentesis). Using a microscope, gout is confirmed when uric acid crystals are seen in the joint fluid.  TREATMENT   There are two phases to gout treatment: treating the sudden onset (acute) attack and preventing attacks (prophylaxis).  · Treatment of an Acute Attack.  ¨ Medicines are used. These include anti-inflammatory medicines or steroid medicines.  ¨ An injection of steroid medicine into the affected joint is sometimes necessary.  ¨ The painful joint is rested. Movement can worsen the arthritis.  ¨ You may use warm or cold treatments on painful joints, depending which works best for you.  · Treatment to Prevent Attacks.  ¨ If you suffer from frequent gout attacks, your caregiver may advise preventive medicine. These medicines are started after the acute attack subsides. These medicines either help your kidneys eliminate uric acid from your body or decrease your uric acid production. You may need to stay on these medicines for a very long time.  ¨ The early phase of treatment with preventive medicine can be associated with an increase in acute gout attacks. For this reason, during the first few months of treatment, your caregiver may also advise you to take medicines usually used for acute gout treatment. Be sure you understand your caregiver's directions. Your caregiver may make several adjustments to your medicine dose before these medicines are effective.  ¨ Discuss dietary treatment with your caregiver or dietitian. Alcohol and drinks high in sugar and fructose and foods  such as meat, poultry, and seafood can increase uric acid levels. Your caregiver or dietitian can advise you on drinks and foods that should be limited.  HOME CARE INSTRUCTIONS   · Do not take aspirin to relieve pain. This raises uric acid levels.  · Only take over-the-counter or prescription medicines for pain, discomfort, or fever as directed by your caregiver.  · Rest the joint as much as possible. When in bed, keep sheets and blankets off painful areas.  · Keep the affected joint raised (elevated).  · Apply warm or cold treatments to painful joints. Use of warm or cold treatments depends on which works best for you.  · Use crutches if the painful joint is in your leg.  · Drink enough fluids to keep your urine clear or pale yellow. This helps your body get rid of uric acid. Limit alcohol, sugary drinks, and fructose drinks.  · Follow your dietary instructions. Pay careful attention to the amount of protein you eat. Your daily diet should emphasize fruits, vegetables, whole grains, and fat-free or low-fat milk products. Discuss the use of coffee, vitamin C, and cherries with your caregiver or dietitian. These may be helpful in lowering uric acid levels.  · Maintain a healthy body weight.  SEEK MEDICAL CARE IF:   · You develop diarrhea, vomiting, or any side effects from medicines.  · You do not feel better in 24 hours, or you are getting worse.  SEEK IMMEDIATE MEDICAL CARE IF:   · Your joint becomes suddenly more tender, and you have chills or a fever.  MAKE SURE YOU:   · Understand these instructions.  · Will watch your condition.  · Will get help right away if you are not doing well or get worse.     This information is not intended to replace advice given to you by your health care provider. Make sure you discuss any questions you have with your health care provider.     Document Released: 12/15/2001 Document Revised: 01/08/2016 Document Reviewed: 07/31/2013  [employer] Interactive Patient Education ©2016  [employer]

## 2017-12-08 ASSESSMENT — ENCOUNTER SYMPTOMS
FEVER: 0
WEAKNESS: 0
CHILLS: 0
NUMBNESS: 0

## 2017-12-08 NOTE — PROGRESS NOTES
"Subjective:   Nneka Ruiz is a 47 y.o. female who presents for Toe Pain (right foot big toe pain, x 24 hours )        Foot Problem   This is a new problem. The current episode started yesterday. The problem occurs constantly. The problem has been rapidly worsening. Pertinent negatives include no chills, fever, numbness or weakness.     Review of Systems   Constitutional: Negative for chills and fever.   Neurological: Negative for weakness and numbness.     Allergies   Allergen Reactions   • Aspirin Vomiting   • Other Misc      Waterproof band aids took off skin and caused infection       Objective:   /78   Pulse 90   Temp 36.6 °C (97.8 °F)   Resp 14   Ht 1.727 m (5' 8\")   Wt (!) 146.5 kg (323 lb)   LMP 12/03/2017   SpO2 98%   Breastfeeding? No   BMI 49.11 kg/m²   Physical Exam   Constitutional: She is oriented to person, place, and time. She appears well-developed and well-nourished. No distress.   HENT:   Head: Normocephalic and atraumatic.   Eyes: Conjunctivae and EOM are normal. Pupils are equal, round, and reactive to light.   Cardiovascular: Normal rate and regular rhythm.    No murmur heard.  Pulmonary/Chest: Effort normal and breath sounds normal. No respiratory distress.   Abdominal: Soft. She exhibits no distension. There is no tenderness.   Musculoskeletal:        Feet:    Neurological: She is alert and oriented to person, place, and time. She has normal reflexes. No sensory deficit.   Skin: Skin is warm and dry.   Psychiatric: She has a normal mood and affect.         Assessment/Plan:   Assessment    1. Acute gout involving toe of right foot, unspecified cause  Differential diagnosis, natural history, supportive care, and indications for immediate follow-up discussed.   - BASIC METABOLIC PANEL; Future  - URIC ACID; Future  - predniSONE (DELTASONE) 10 MG Tab; Take 4 Tabs by mouth every day for 15 days.  Dispense: 60 Tab; Refill: 0        "

## 2017-12-20 ENCOUNTER — HOSPITAL ENCOUNTER (OUTPATIENT)
Facility: MEDICAL CENTER | Age: 47
End: 2017-12-20
Attending: PHYSICIAN ASSISTANT

## 2017-12-20 ENCOUNTER — OFFICE VISIT (OUTPATIENT)
Dept: URGENT CARE | Facility: PHYSICIAN GROUP | Age: 47
End: 2017-12-20
Payer: COMMERCIAL

## 2017-12-20 VITALS
RESPIRATION RATE: 14 BRPM | TEMPERATURE: 98.9 F | DIASTOLIC BLOOD PRESSURE: 82 MMHG | SYSTOLIC BLOOD PRESSURE: 128 MMHG | BODY MASS INDEX: 49.26 KG/M2 | OXYGEN SATURATION: 98 % | WEIGHT: 293 LBS | HEART RATE: 74 BPM

## 2017-12-20 DIAGNOSIS — R10.9 FLANK PAIN: ICD-10-CM

## 2017-12-20 DIAGNOSIS — M79.18 LUMBAR MUSCLE PAIN: ICD-10-CM

## 2017-12-20 DIAGNOSIS — E66.01 MORBID OBESITY WITH BMI OF 45.0-49.9, ADULT (HCC): ICD-10-CM

## 2017-12-20 DIAGNOSIS — Z23 NEED FOR INFLUENZA VACCINATION: ICD-10-CM

## 2017-12-20 LAB
APPEARANCE UR: CLEAR
BILIRUB UR STRIP-MCNC: NORMAL MG/DL
COLOR UR AUTO: NORMAL
GLUCOSE UR STRIP.AUTO-MCNC: NORMAL MG/DL
KETONES UR STRIP.AUTO-MCNC: NORMAL MG/DL
LEUKOCYTE ESTERASE UR QL STRIP.AUTO: NORMAL
NITRITE UR QL STRIP.AUTO: NORMAL
PH UR STRIP.AUTO: 5 [PH] (ref 5–8)
PROT UR QL STRIP: NORMAL MG/DL
RBC UR QL AUTO: NORMAL
SP GR UR STRIP.AUTO: 1.02
UROBILINOGEN UR STRIP-MCNC: NORMAL MG/DL

## 2017-12-20 PROCEDURE — 81002 URINALYSIS NONAUTO W/O SCOPE: CPT | Performed by: PHYSICIAN ASSISTANT

## 2017-12-20 PROCEDURE — 87077 CULTURE AEROBIC IDENTIFY: CPT

## 2017-12-20 PROCEDURE — 90471 IMMUNIZATION ADMIN: CPT | Performed by: PHYSICIAN ASSISTANT

## 2017-12-20 PROCEDURE — 90686 IIV4 VACC NO PRSV 0.5 ML IM: CPT | Performed by: PHYSICIAN ASSISTANT

## 2017-12-20 PROCEDURE — 87086 URINE CULTURE/COLONY COUNT: CPT

## 2017-12-20 PROCEDURE — 99214 OFFICE O/P EST MOD 30 MIN: CPT | Mod: 25 | Performed by: PHYSICIAN ASSISTANT

## 2017-12-20 RX ORDER — CYCLOBENZAPRINE HCL 10 MG
10 TABLET ORAL 3 TIMES DAILY PRN
Qty: 30 TAB | Refills: 0 | Status: SHIPPED | OUTPATIENT
Start: 2017-12-20 | End: 2018-07-18

## 2017-12-20 ASSESSMENT — PAIN SCALES - GENERAL: PAINLEVEL: 6=MODERATE PAIN

## 2017-12-20 NOTE — PROGRESS NOTES
Chief Complaint   Patient presents with   • Back Pain   • Urinary Pain       HISTORY OF PRESENT ILLNESS: Patient is a 47 y.o. female who presents today becauseShe has bilateral flank pain, right worse than left. There has been going on for several days. She does note that he does seem to help. She denies any urinary symptoms. She denies any fevers, chills, nausea, vomiting or diarrhea.    Patient Active Problem List    Diagnosis Date Noted   • Morbid obesity with BMI of 45.0-49.9, adult ([facility]) 12/20/2017   • Stenosis of esophagus 05/17/2017   • Morbid obesity (CMS-[facility]) 02/03/2016   • Tear of medial cartilage or meniscus of knee, current 12/10/2014   • Central perforation of tympanic membrane 11/06/2012   • Ruptured ear drum 08/29/2012   • Depression 08/29/2012   • Pedal edema 08/29/2012   • Obesity 08/29/2012       Allergies:Aspirin and Other misc    Current Outpatient Prescriptions Ordered in [facility]   Medication Sig Dispense Refill   • cyclobenzaprine (FLEXERIL) 10 MG Tab Take 1 Tab by mouth 3 times a day as needed. 30 Tab 0   • multivitamin (THERAGRAN) Tab Take 1 Tab by mouth every day.     • omeprazole (PRILOSEC) 40 MG delayed-release capsule Take 1 Cap by mouth every day. 30 Cap 3   • lisinopril-hydrochlorothiazide (PRINZIDE, ZESTORETIC) 20-12.5 MG per tablet Take 1 Tab by mouth every morning.     • citalopram (CELEXA) 20 MG Tab Take 20 mg by mouth every morning.     • acetaminophen (TYLENOL) 500 MG Tab Take 500-1,000 mg by mouth every 6 hours as needed.       No current Epic-ordered facility-administered medications on file.        Past Medical History:   Diagnosis Date   • Anemia in pregnancy    • Anxiety    • Cold    • Dental disorder     lower partial   • Hypertension    • Pain 2014    left knee   • Psychiatric problem     anxiety       Social History   Substance Use Topics   • Smoking status: Former Smoker     Packs/day: 1.00     Years: 12.00     Types: Cigarettes     Quit date: 12/1/2014   • Smokeless  tobacco: Never Used      Comment: 2 yrs  1/2 ppd   • Alcohol use 0.0 oz/week      Comment: 0-2 drinks per month       Family Status   Relation Status   • Mother Alive   • Father Alive   No family history on file.    ROS:  Review of Systems   Constitutional: Negative for fever, chills, weight loss and malaise/fatigue.   HENT: Negative for ear pain, nosebleeds, congestion, sore throat and neck pain.    Eyes: Negative for blurred vision.   Respiratory: Negative for cough, sputum production, shortness of breath and wheezing.    Cardiovascular: Negative for chest pain, palpitations, orthopnea and leg swelling.   Gastrointestinal: Negative for heartburn, nausea, vomiting and abdominal pain.   Genitourinary: Negative for dysuria, urgency and frequency.     Exam:  Blood pressure 128/82, pulse 74, temperature 37.2 °C (98.9 °F), resp. rate 14, weight (!) 147 kg (324 lb), last menstrual period 12/03/2017, SpO2 98 %.  General: Well-developed, obese female in NAD  Head:Normocephalic, atraumatic  Eyes: PERRLA, EOM within normal limits, no conjunctival injection, no scleral icterus, visual fields and acuity grossly intact.  Extremities: no clubbing, cyanosis, or edema.  Muscular skeletal: She has tenderness to palpation of the paraspinous musculature and tissues on the right lumbar area. No vertebral point tenderness.    Urinalysis shows specific gravity 1.025, moderate amount of blood, trace protein, no other abnormalities    Please note that this dictation was created using voice recognition software. I have made every reasonable attempt to correct obvious errors, but I expect that there are errors of grammar and possibly content that I did not discover before finalizing the note.    Assessment/Plan:  1. Lumbar muscle pain  cyclobenzaprine (FLEXERIL) 10 MG Tab   2. Flank pain  URINE CULTURE(NEW)    POCT Urinalysis   3. Need for influenza vaccination  Flu Quad Inj >3 Year Pre-Filled PF   4. Morbid obesity with BMI of 45.0-49.9,  adult (CMS-[facility])  Patient identified as having weight management issue.  Appropriate orders and counseling given.   , Increase by mouth fluids.    Followup with primary care in the next 7-10 days if not significantly improving, return to the urgent care or go to the emergency room sooner for any worsening of symptoms.

## 2017-12-22 DIAGNOSIS — R82.71 GROUP B STREPTOCOCCAL BACTERIURIA: ICD-10-CM

## 2017-12-22 LAB
BACTERIA UR CULT: ABNORMAL
BACTERIA UR CULT: ABNORMAL
SIGNIFICANT IND 70042: ABNORMAL
SITE SITE: ABNORMAL
SOURCE SOURCE: ABNORMAL

## 2017-12-22 RX ORDER — DOXYCYCLINE HYCLATE 100 MG
100 TABLET ORAL 2 TIMES DAILY
Qty: 20 TAB | Refills: 0 | Status: SHIPPED | OUTPATIENT
Start: 2017-12-22 | End: 2018-01-01

## 2017-12-24 ENCOUNTER — TELEPHONE (OUTPATIENT)
Dept: URGENT CARE | Facility: PHYSICIAN GROUP | Age: 47
End: 2017-12-24

## 2017-12-24 NOTE — TELEPHONE ENCOUNTER
----- Message from Saurabh Wallis P.A.-C. sent at 12/22/2017  4:52 PM PST -----  Please notify the patient that the urine culture was positive for bacterial infection with group b strep, I have sent in a prescription to her pharmacy.  Follow-up with primary care next 2 weeks if still symptomatic

## 2018-02-19 ENCOUNTER — OFFICE VISIT (OUTPATIENT)
Dept: URGENT CARE | Facility: PHYSICIAN GROUP | Age: 48
End: 2018-02-19
Payer: COMMERCIAL

## 2018-02-19 ENCOUNTER — HOSPITAL ENCOUNTER (OUTPATIENT)
Facility: MEDICAL CENTER | Age: 48
End: 2018-02-19
Attending: PHYSICIAN ASSISTANT
Payer: COMMERCIAL

## 2018-02-19 VITALS
RESPIRATION RATE: 16 BRPM | HEART RATE: 108 BPM | HEIGHT: 68 IN | TEMPERATURE: 98.6 F | OXYGEN SATURATION: 95 % | BODY MASS INDEX: 44.41 KG/M2 | DIASTOLIC BLOOD PRESSURE: 76 MMHG | SYSTOLIC BLOOD PRESSURE: 132 MMHG | WEIGHT: 293 LBS

## 2018-02-19 DIAGNOSIS — N89.8 VAGINAL SORE: ICD-10-CM

## 2018-02-19 DIAGNOSIS — B00.9 HSV-2 INFECTION: ICD-10-CM

## 2018-02-19 DIAGNOSIS — B96.89 BV (BACTERIAL VAGINOSIS): ICD-10-CM

## 2018-02-19 DIAGNOSIS — N76.0 BV (BACTERIAL VAGINOSIS): ICD-10-CM

## 2018-02-19 LAB
CANDIDA DNA VAG QL PROBE+SIG AMP: NEGATIVE
G VAGINALIS DNA VAG QL PROBE+SIG AMP: POSITIVE
T VAGINALIS DNA VAG QL PROBE+SIG AMP: NEGATIVE

## 2018-02-19 PROCEDURE — 87660 TRICHOMONAS VAGIN DIR PROBE: CPT

## 2018-02-19 PROCEDURE — 99214 OFFICE O/P EST MOD 30 MIN: CPT | Performed by: PHYSICIAN ASSISTANT

## 2018-02-19 PROCEDURE — 87529 HSV DNA AMP PROBE: CPT

## 2018-02-19 PROCEDURE — 87510 GARDNER VAG DNA DIR PROBE: CPT

## 2018-02-19 PROCEDURE — 87480 CANDIDA DNA DIR PROBE: CPT

## 2018-02-19 RX ORDER — VALACYCLOVIR HYDROCHLORIDE 1 G/1
1000 TABLET, FILM COATED ORAL 2 TIMES DAILY
Qty: 10 TAB | Refills: 0 | Status: SHIPPED | OUTPATIENT
Start: 2018-02-19 | End: 2019-03-01 | Stop reason: CLARIF

## 2018-02-19 RX ORDER — LIDOCAINE 50 MG/G
OINTMENT TOPICAL
Qty: 30 G | Refills: 0 | Status: SHIPPED | OUTPATIENT
Start: 2018-02-19 | End: 2018-07-18

## 2018-02-20 LAB
HSV1+2 DNA SPEC QL NAA+PROBE: ABNORMAL
SIGNIFICANT IND 70042: ABNORMAL
SITE SITE: ABNORMAL
SOURCE SOURCE: ABNORMAL

## 2018-02-21 RX ORDER — METRONIDAZOLE 500 MG/1
500 TABLET ORAL 2 TIMES DAILY
Qty: 14 TAB | Refills: 0 | Status: SHIPPED | OUTPATIENT
Start: 2018-02-21 | End: 2018-02-28

## 2018-02-22 NOTE — PROGRESS NOTES
Chief Complaint   Patient presents with   • Rash       HISTORY OF PRESENT ILLNESS: Patient is a 47 y.o. female who presents today for about 4-5 days of discomfort, possible sores vaginally.  Patient states she thought she may have been getting a yeast infection and tried some OTC cream but when symptoms did not improve she became concerned. She notes when she pees she does have quite a bit of discomfort. Patient recalls that she did have unprotected sexual encounter about 8 weeks ago. Patient states she had STD swabs done and negative at the time. No new partners since then. No abdominal pain, cramping or vaginal discharge.  No spotting or bleeding.     Patient Active Problem List    Diagnosis Date Noted   • Morbid obesity with BMI of 45.0-49.9, adult ([facility]) 12/20/2017   • Stenosis of esophagus 05/17/2017   • Morbid obesity (CMS-HCC) 02/03/2016   • Tear of medial cartilage or meniscus of knee, current 12/10/2014   • Central perforation of tympanic membrane 11/06/2012   • Ruptured ear drum 08/29/2012   • Depression 08/29/2012   • Pedal edema 08/29/2012   • Obesity 08/29/2012       Allergies:Aspirin and Other misc    Current Outpatient Prescriptions Ordered in [facility]   Medication Sig Dispense Refill   • metroNIDAZOLE (FLAGYL) 500 MG Tab Take 1 Tab by mouth 2 Times a Day for 7 days. 14 Tab 0   • valacyclovir (VALTREX) 1 GM Tab Take 1 Tab by mouth 2 times a day. 10 Tab 0   • lidocaine (XYLOCAINE) 5 % Ointment Apply to affected area up to QID prn. 30 g 0   • cyclobenzaprine (FLEXERIL) 10 MG Tab Take 1 Tab by mouth 3 times a day as needed. 30 Tab 0   • multivitamin (THERAGRAN) Tab Take 1 Tab by mouth every day.     • omeprazole (PRILOSEC) 40 MG delayed-release capsule Take 1 Cap by mouth every day. 30 Cap 3   • lisinopril-hydrochlorothiazide (PRINZIDE, ZESTORETIC) 20-12.5 MG per tablet Take 1 Tab by mouth every morning.     • citalopram (CELEXA) 20 MG Tab Take 20 mg by mouth every morning.     • acetaminophen (TYLENOL) 500  "MG Tab Take 500-1,000 mg by mouth every 6 hours as needed.       No current Epic-ordered facility-administered medications on file.        Past Medical History:   Diagnosis Date   • Anemia in pregnancy    • Anxiety    • Cold    • Dental disorder     lower partial   • Hypertension    • Pain 2014    left knee   • Psychiatric problem     anxiety       Social History   Substance Use Topics   • Smoking status: Former Smoker     Packs/day: 1.00     Years: 12.00     Types: Cigarettes     Quit date: 12/1/2014   • Smokeless tobacco: Never Used      Comment: 2 yrs  1/2 ppd   • Alcohol use 0.0 oz/week      Comment: 0-2 drinks per month       Family Status   Relation Status   • Mother Alive   • Father Alive   No family history on file. No pertinent FH    ROS:  Review of Systems   Constitutional: Negative for fever, chills, weight loss and malaise/fatigue.   HENT: Negative for ear pain, nosebleeds, congestion, sore throat and neck pain.    Eyes: Negative for blurred vision.   Respiratory: Negative for cough, sputum production, shortness of breath and wheezing.    Cardiovascular: Negative for chest pain, palpitations, orthopnea and leg swelling.   Gastrointestinal: Negative for heartburn, nausea, vomiting and abdominal pain.   All other systems reviewed and are negative.     Exam:  Blood pressure 132/76, pulse (!) 108, temperature 37 °C (98.6 °F), resp. rate 16, height 1.727 m (5' 8\"), weight (!) 147.9 kg (326 lb), SpO2 95 %.  General:  Well nourished, well developed female in NAD  Eyes: PERRLA, EOM within normal limits, no conjunctival injection, no scleral icterus, visual fields and acuity grossly intact.   Mouth: reasonable hygiene, no erythema exudates or tonsillar enlargement.  Neck: no masses, range of motion within normal limits, no tracheal deviation. No lymphadenopathy  Pulmonary: Normal respiratory effort, no wheezes, crackles, or rhonchi.  Cardiovascular: regular rate and rhythm without murmurs, rubs, or " gallops.  Abdomen: Soft, nontender, nondistended. Normal bowel sounds. No hepatosplenomegaly or masses, or hernias. No rebound or guarding.  : : Vulva on inspection: No swelling, erythema. Two vesicular appearing lesions noted, sample taken.   Vag pathogens swab taken due to patient concern for yeast infection.   Skin:Warm, pink, dry.   Extremities: no clubbing, cyanosis, or edema.  Neuro: A&O x 3. Speech normal/clear.  Normal gait.     Significant Indicator  (Positive)   POS (POS)    Source   GEN    Site   HSV 1/2 PCR  (Abnormal)    (A)   Positive for HSV Type 2 by PCR.   Negative for HSV Type 1 by PCR.   NOTE:   This test has not been FDA approved.   It has been validated in the laboratory in accordance   with CLIA guidelines.      Component Results     Component Value Ref Range & Units Status   Candida species DNA Probe Negative  Negative Final   Trichamonas vaginalis DNA Probe Negative  Negative Final   Gardnerella vaginalis DNA Probe POSITIVE   Negative Final         Assessment/Plan:  1. HSV-2 infection  VAGINAL PATHOGENS DNA PANEL    HSV 1/2 DETECT+DIFF BY PCR    valacyclovir (VALTREX) 1 GM Tab    lidocaine (XYLOCAINE) 5 % Ointment   2. BV (bacterial vaginosis)  metroNIDAZOLE (FLAGYL) 500 MG Tab         -results as above.   -did discuss these findings, labs with patient in great detail, encouraged her to reach out with any more concerns or questions at any time. .   -she is encouraged to make follow up with PCP/GYN for well woman care and follow up in next few weeks.        Supportive care, differential diagnoses, and indications for immediate follow-up discussed with patient.   Pathogenesis of diagnosis discussed including typical length and natural progression.   Instructed to return to clinic or nearest emergency department for any change in condition, further concerns, or worsening of symptoms.  Patient states understanding of the plan of care and discharge instructions.      Na Calderón,  FABIOLA

## 2018-02-26 ENCOUNTER — TELEPHONE (OUTPATIENT)
Dept: URGENT CARE | Facility: PHYSICIAN GROUP | Age: 48
End: 2018-02-26

## 2018-02-26 DIAGNOSIS — B00.9 HSV-2 (HERPES SIMPLEX VIRUS 2) INFECTION: ICD-10-CM

## 2018-02-27 RX ORDER — ACYCLOVIR 400 MG/1
800 TABLET ORAL 2 TIMES DAILY
Qty: 20 TAB | Refills: 0 | Status: SHIPPED | OUTPATIENT
Start: 2018-02-27 | End: 2018-03-04

## 2018-02-27 NOTE — TELEPHONE ENCOUNTER
Patient would like to get a different medication for her rash. Patient stated that the medication that was prescribed to her was not working. [facility]  Please advise thank you.

## 2018-05-01 ENCOUNTER — NON-PROVIDER VISIT (OUTPATIENT)
Dept: URGENT CARE | Facility: PHYSICIAN GROUP | Age: 48
End: 2018-05-01
Payer: COMMERCIAL

## 2018-05-01 ENCOUNTER — APPOINTMENT (OUTPATIENT)
Dept: RADIOLOGY | Facility: IMAGING CENTER | Age: 48
End: 2018-05-01
Attending: CHIROPRACTOR
Payer: COMMERCIAL

## 2018-05-01 DIAGNOSIS — M54.5 LOW BACK PAIN, UNSPECIFIED BACK PAIN LATERALITY, UNSPECIFIED CHRONICITY, WITH SCIATICA PRESENCE UNSPECIFIED: ICD-10-CM

## 2018-05-01 PROCEDURE — 72100 X-RAY EXAM L-S SPINE 2/3 VWS: CPT | Mod: TC,FY | Performed by: PHYSICIAN ASSISTANT

## 2018-05-01 PROCEDURE — 72170 X-RAY EXAM OF PELVIS: CPT | Mod: TC,FY | Performed by: PHYSICIAN ASSISTANT

## 2018-05-12 ENCOUNTER — APPOINTMENT (OUTPATIENT)
Dept: RADIOLOGY | Facility: IMAGING CENTER | Age: 48
End: 2018-05-12
Attending: FAMILY MEDICINE
Payer: COMMERCIAL

## 2018-05-12 ENCOUNTER — OFFICE VISIT (OUTPATIENT)
Dept: URGENT CARE | Facility: CLINIC | Age: 48
End: 2018-05-12
Payer: COMMERCIAL

## 2018-05-12 VITALS
BODY MASS INDEX: 44.41 KG/M2 | SYSTOLIC BLOOD PRESSURE: 102 MMHG | HEIGHT: 68 IN | TEMPERATURE: 98.2 F | OXYGEN SATURATION: 97 % | HEART RATE: 88 BPM | WEIGHT: 293 LBS | DIASTOLIC BLOOD PRESSURE: 60 MMHG | RESPIRATION RATE: 16 BRPM

## 2018-05-12 DIAGNOSIS — S93.492A SPRAIN OF OTHER LIGAMENT OF LEFT ANKLE, INITIAL ENCOUNTER: ICD-10-CM

## 2018-05-12 PROCEDURE — 99214 OFFICE O/P EST MOD 30 MIN: CPT | Performed by: FAMILY MEDICINE

## 2018-05-12 PROCEDURE — 73610 X-RAY EXAM OF ANKLE: CPT | Mod: TC,FY,LT | Performed by: FAMILY MEDICINE

## 2018-05-12 ASSESSMENT — ENCOUNTER SYMPTOMS
FOCAL WEAKNESS: 0
FEVER: 0
SHORTNESS OF BREATH: 0
ORTHOPNEA: 0
DIZZINESS: 0
CHILLS: 0
HEMOPTYSIS: 0

## 2018-05-12 ASSESSMENT — PAIN SCALES - GENERAL: PAINLEVEL: 10=SEVERE PAIN

## 2018-05-12 NOTE — PROGRESS NOTES
Subjective:      Nneka Ruiz is a 47 y.o. female who presents with Ankle Injury (Left ankle pain, swollent, fell four steps landed on it wrong)    Chief Complaint   Patient presents with   • Ankle Injury     Left ankle pain, swollent, fell four steps landed on it wrong        - This is a very pleasant 47 y.o. female with complaints of pain Lt ankle after trip and rolling it today          ALLERGIES:  Aspirin and Other misc     PMH:  Past Medical History:   Diagnosis Date   • Anemia in pregnancy    • Anxiety    • Cold    • Dental disorder     lower partial   • Hypertension    • Pain 2014    left knee   • Psychiatric problem     anxiety        MEDS:    Current Outpatient Prescriptions:   •  tramadol-acetaminophen (ULTRACET) 37.5-325 MG per tablet, Take 1 Tab by mouth every 6 hours as needed for Mild Pain or Moderate Pain for up to 14 days., Disp: 30 Tab, Rfl: 0  •  valacyclovir (VALTREX) 1 GM Tab, Take 1 Tab by mouth 2 times a day., Disp: 10 Tab, Rfl: 0  •  lidocaine (XYLOCAINE) 5 % Ointment, Apply to affected area up to QID prn., Disp: 30 g, Rfl: 0  •  cyclobenzaprine (FLEXERIL) 10 MG Tab, Take 1 Tab by mouth 3 times a day as needed., Disp: 30 Tab, Rfl: 0  •  multivitamin (THERAGRAN) Tab, Take 1 Tab by mouth every day., Disp: , Rfl:   •  omeprazole (PRILOSEC) 40 MG delayed-release capsule, Take 1 Cap by mouth every day., Disp: 30 Cap, Rfl: 3  •  lisinopril-hydrochlorothiazide (PRINZIDE, ZESTORETIC) 20-12.5 MG per tablet, Take 1 Tab by mouth every morning., Disp: , Rfl:   •  citalopram (CELEXA) 20 MG Tab, Take 20 mg by mouth every morning., Disp: , Rfl:   •  acetaminophen (TYLENOL) 500 MG Tab, Take 500-1,000 mg by mouth every 6 hours as needed., Disp: , Rfl:     ** I have documented what I find to be significant in regards to past medical, social, family and surgical history  in my HPI or under PMH/PSH/FH review section, otherwise it is contributory **           HPI    Review of Systems   Constitutional:  "Negative for chills and fever.   Respiratory: Negative for hemoptysis and shortness of breath.    Cardiovascular: Negative for chest pain and orthopnea.   Neurological: Negative for dizziness and focal weakness.          Objective:     /60   Pulse 88   Temp 36.8 °C (98.2 °F)   Resp 16   Ht 1.727 m (5' 8\")   Wt (!) 147.9 kg (326 lb)   SpO2 97%   Breastfeeding? No   BMI 49.57 kg/m²      Physical Exam   Constitutional: She appears well-developed. No distress.   HENT:   Head: Normocephalic and atraumatic.   Mouth/Throat: Oropharynx is clear and moist.   Eyes: Conjunctivae are normal.   Neck: Neck supple.   Cardiovascular: Regular rhythm.    No murmur heard.  Pulmonary/Chest: Effort normal. No respiratory distress.   Neurological: She is alert. She exhibits normal muscle tone.   Skin: Skin is warm and dry.   Psychiatric: She has a normal mood and affect. Judgment normal.   Nursing note and vitals reviewed.  Lt ankle: + edema and TTP, good srom, no bruising wounds             Assessment/Plan:         1. Lt ankle broken  DX-ANKLE 3+ VIEWS LEFT    REFERRAL TO SPORTS MEDICINE    tramadol-acetaminophen (ULTRACET) 37.5-325 MG per tablet       - RICE/Aleve  - Tall walker boot  - sport med f/u       Dx & d/c instructions discussed w/ patient and/or family members. Follow up w/ Prvt Dr or here in 3-4 days if not getting better, sooner if needed,  ER if worse and UC/PCP unavailable.        Possible side effects (i.e. Rash, GI upset/constipation, sedation, elevation of BP or sugars) of any medications given discussed.                "

## 2018-05-15 ENCOUNTER — OFFICE VISIT (OUTPATIENT)
Dept: MEDICAL GROUP | Facility: CLINIC | Age: 48
End: 2018-05-15
Payer: COMMERCIAL

## 2018-05-15 VITALS
SYSTOLIC BLOOD PRESSURE: 122 MMHG | OXYGEN SATURATION: 97 % | WEIGHT: 293 LBS | BODY MASS INDEX: 44.41 KG/M2 | RESPIRATION RATE: 18 BRPM | TEMPERATURE: 98.2 F | HEART RATE: 72 BPM | DIASTOLIC BLOOD PRESSURE: 80 MMHG | HEIGHT: 68 IN

## 2018-05-15 DIAGNOSIS — M62.830 SPASM OF THORACIC BACK MUSCLE: ICD-10-CM

## 2018-05-15 DIAGNOSIS — S82.832A CLOSED TRAUMATIC NONDISPLACED FRACTURE OF DISTAL END OF LEFT FIBULA, INITIAL ENCOUNTER: ICD-10-CM

## 2018-05-15 PROCEDURE — 27786 TREATMENT OF ANKLE FRACTURE: CPT | Mod: LT | Performed by: FAMILY MEDICINE

## 2018-05-15 ASSESSMENT — ENCOUNTER SYMPTOMS
FEVER: 0
CHILLS: 0
VOMITING: 0
NAUSEA: 0
DIZZINESS: 0
SHORTNESS OF BREATH: 0
HEADACHES: 0

## 2018-05-15 NOTE — PROGRESS NOTES
Subjective:      Nneka Ruiz is a 47 y.o. female who presents with Ankle Injury (Referral from / L ankle fracture)      Referred by Ronny Monroy M.D. for evaluation of LEFT ankle pain    HPI   LEFT ankle pain  Date of injury Saturday, May 12, 2018  Mechanism of injury, inversion injury after she tripped falling down the stairs, then fall forward (4 steps)  Does not recall feeling snap or pop  Sudden sharp pain, at the LEFT lateral malleolar region  No radiation  Worse with weightbearing, she was able to walk immediately after the injury  Improved with rest and immobilization  Prescribed tramadol for pain at urgent care, not helping, Aleve helping some  POSITIVE night symptoms  Denies any prior issues with the LEFT ankle  POSITIVE swelling, which seems to be increasing    Review of Systems   Constitutional: Negative for chills and fever.   Respiratory: Negative for shortness of breath.    Cardiovascular: Negative for chest pain.   Gastrointestinal: Negative for nausea and vomiting.   Neurological: Negative for dizziness and headaches.     PMH:  has a past medical history of Anemia; Anxiety; Cold; Dental disorder; Hypertension; Pain (2014); and Psychiatric problem. She also has no past medical history of CAD (coronary artery disease); COPD; Liver disease; or Sickle cell disease (HCC).  MEDS:   Current Outpatient Prescriptions:   •  tramadol-acetaminophen (ULTRACET) 37.5-325 MG per tablet, Take 1 Tab by mouth every 6 hours as needed for Mild Pain or Moderate Pain for up to 14 days., Disp: 30 Tab, Rfl: 0  •  valacyclovir (VALTREX) 1 GM Tab, Take 1 Tab by mouth 2 times a day., Disp: 10 Tab, Rfl: 0  •  lidocaine (XYLOCAINE) 5 % Ointment, Apply to affected area up to QID prn., Disp: 30 g, Rfl: 0  •  cyclobenzaprine (FLEXERIL) 10 MG Tab, Take 1 Tab by mouth 3 times a day as needed., Disp: 30 Tab, Rfl: 0  •  multivitamin (THERAGRAN) Tab, Take 1 Tab by mouth every day., Disp: , Rfl:   •  omeprazole (PRILOSEC)  "40 MG delayed-release capsule, Take 1 Cap by mouth every day., Disp: 30 Cap, Rfl: 3  •  lisinopril-hydrochlorothiazide (PRINZIDE, ZESTORETIC) 20-12.5 MG per tablet, Take 1 Tab by mouth every morning., Disp: , Rfl:   •  citalopram (CELEXA) 20 MG Tab, Take 20 mg by mouth every morning., Disp: , Rfl:   •  acetaminophen (TYLENOL) 500 MG Tab, Take 500-1,000 mg by mouth every 6 hours as needed., Disp: , Rfl:   ALLERGIES:   Allergies   Allergen Reactions   • Aspirin Vomiting   • Other Misc      Waterproof band aids took off skin and caused infection      SURGHX:   Past Surgical History:   Procedure Laterality Date   • GASTROSCOPY  5/17/2017    Procedure: GASTROSCOPY W/DILATATION, 30-MM ACHALASIA;  Surgeon: Keegan Hernandez M.D.;  Location: SURGERY [facility];  Service:    • GASTRIC SLEEVE LAPAROSCOPY N/A 2/3/2016    Procedure: GASTRIC SLEEVE LAPAROSCOPY AND LIVER BIOPSY;  Surgeon: Keegan Hernandez M.D.;  Location: SURGERY [facility];  Service:    • KNEE ARTHROSCOPY  12/10/2014    Performed by Mikel Spencer M.D. at [facility]   • MEDIAL MENISCECTOMY  12/10/2014    Performed by Mikel Spencer M.D. at [facility]   • MENISCECTOMY  12/10/2014    Performed by Mikel Spencer M.D. at [facility]   • LATERAL RELEASE  12/10/2014    Performed by Mikel Spencer M.D. at [facility]   • TYMPANOPLASTY  11/6/2012    Performed by Jonn Kaufman M.D. at SURGERY SAME DAY [facility]   • TONSILLECTOMY     • TUBAL LIGATION       SOCHX:  reports that she quit smoking about 5 months ago. Her smoking use included Cigarettes. She has a 12.00 pack-year smoking history. She has never used smokeless tobacco. She reports that she drinks alcohol. She reports that she does not use drugs.  FH: Family history was reviewed, no pertinent findings to report       Objective:     /80   Pulse 72   Temp 36.8 °C (98.2 °F)   Resp 18   Ht 1.727 m (5' 8\")   Wt (!) 147.9 kg " (326 lb)   SpO2 97%   BMI 49.57 kg/m²       Physical Exam      RIGHT ANKLE:  There is NO swelling noted at the ankle  Range of motion intact with dorsiflexion and plantarflexion, inversion and eversion  There is NO tenderness of the ATFL, CF or PT of ligament  There is NO tenderness of the lateral malleolus or medial malleolus  Anterior drawer testing is NEGATIVE  Talar tilt testing is NEGATIVE  The foot and ankle is otherwise neurovascularly intact    RIGHT FOOT:  There is NO swelling noted at the foot  There is NO tenderness at the base of the fifth metatarsal, cuboid, or tarsal navicular  There is NO pain with metatarsal squeeze test    LEFT ANKLE:  There is POSITIVE swelling noted at the ankle  Range of motion LIMITED with dorsiflexion and plantarflexion, inversion and eversion  There is POSITIVE tenderness of the ATFL and CF without tenderness of the PTf ligament  There is POSITIVE tenderness of the lateral malleolus without tenderness of the medial malleolus  Anterior drawer testing is POSITIVE  Talar tilt testing is NEGATIVE  The foot and ankle is otherwise neurovascularly intact    LEFT FOOT:  There is NO swelling noted at the foot  There is NO tenderness at the base of the fifth metatarsal, cuboid, or tarsal navicular  There is NO pain with metatarsal squeeze test    NEUTRAL stance  Able to ambulate with ANTALGIC gait    POSITIVE muscle tension/trigger point at the RIGHT upper back/periscapular region  Some discomfort along the eighth rib on the RIGHT without any point tenderness, crepitus  No labored breathing       Assessment/Plan:     1. Closed traumatic nondisplaced fracture of distal end of left fibula, initial encounter     2. Spasm of thoracic back muscle      RIGHT/linda-scapular     Stable fracture, no mortise widening  Fracture is stable in the Cam Walker boot    The plan is to continue Cam Walker boot for a total of approximately 6 weeks (removal on or after June 26, 2018)    She plans on  traveling to [facility] in 2 weeks for a week, she should be able to travel with her but without incident      Return in about 1 week (around 5/22/2018). For fracture check                  5/12/2018 10:26 AM    HISTORY/REASON FOR EXAM:  Pain/Deformity Following Trauma      TECHNIQUE/EXAM DESCRIPTION AND NUMBER OF VIEWS:  3 views of the LEFT ankle.    COMPARISON: None    FINDINGS:    There is a comminuted fracture in the distal left fibula just superior to the ankle mortise. No significant widening of the medial mortise is appreciated. No osteochondral lesion is identified.  Soft tissue swelling overlies the fracture site.   Enthesophytes project from the calcaneus.   Impression         Comminuted distal fibular fracture with overlying soft tissue swelling.     Interpreted in the office today with the patient    Thank you Ronny Monroy M.D. for allowing me to participate in caring for your patient.

## 2018-05-22 ENCOUNTER — APPOINTMENT (OUTPATIENT)
Dept: MEDICAL GROUP | Facility: CLINIC | Age: 48
End: 2018-05-22

## 2018-05-25 ENCOUNTER — APPOINTMENT (OUTPATIENT)
Dept: RADIOLOGY | Facility: IMAGING CENTER | Age: 48
End: 2018-05-25
Attending: FAMILY MEDICINE
Payer: COMMERCIAL

## 2018-05-25 ENCOUNTER — OFFICE VISIT (OUTPATIENT)
Dept: MEDICAL GROUP | Facility: CLINIC | Age: 48
End: 2018-05-25
Payer: COMMERCIAL

## 2018-05-25 VITALS
OXYGEN SATURATION: 96 % | HEIGHT: 68 IN | WEIGHT: 293 LBS | HEART RATE: 74 BPM | BODY MASS INDEX: 44.41 KG/M2 | RESPIRATION RATE: 16 BRPM | DIASTOLIC BLOOD PRESSURE: 82 MMHG | SYSTOLIC BLOOD PRESSURE: 120 MMHG | TEMPERATURE: 97.2 F

## 2018-05-25 DIAGNOSIS — S82.832A CLOSED TRAUMATIC NONDISPLACED FRACTURE OF DISTAL END OF LEFT FIBULA, INITIAL ENCOUNTER: ICD-10-CM

## 2018-05-25 DIAGNOSIS — M72.2 PLANTAR FASCIITIS, RIGHT: ICD-10-CM

## 2018-05-25 DIAGNOSIS — R07.81 RIB PAIN ON RIGHT SIDE: ICD-10-CM

## 2018-05-25 PROCEDURE — 73610 X-RAY EXAM OF ANKLE: CPT | Mod: TC,LT | Performed by: FAMILY MEDICINE

## 2018-05-25 PROCEDURE — 99213 OFFICE O/P EST LOW 20 MIN: CPT | Mod: 24 | Performed by: FAMILY MEDICINE

## 2018-05-25 ASSESSMENT — ENCOUNTER SYMPTOMS
DIZZINESS: 0
HEADACHES: 0
SHORTNESS OF BREATH: 0
FEVER: 0
NAUSEA: 0
CHILLS: 0
VOMITING: 0

## 2018-05-25 NOTE — PROGRESS NOTES
Subjective:      Nneka Ruiz is a 47 y.o. female who presents with Follow-Up (x 1 wk follow up)      Referred by Ronny Monroy M.D. for evaluation of LEFT ankle pain    HPI   LEFT ankle pain  Date of injury Saturday, May 12, 2018  Mechanism of injury, inversion injury after she tripped falling down the stairs, then fall forward (4 steps)  Does not recall feeling snap or pop  Sudden sharp pain, at the LEFT lateral malleolar region  No radiation    Had a NEW episode YESTERDAY  Slipped while walking in her Cam Walker boot in the rain  She felt sudden sharp pain at the left lateral ankle at the region of her fracture  Pain is worse now than it was prior to the incident  Sharp, lateral, no radiation, worse with palpation and movement  Improved with immobilization and rest    Patient also has a history of heel spurs  She is asking whether this may be associated with her plantar fasciitis  She occasionally gets pain at the plantar aspect of the heel of both feet    She is also complaining of right-sided rib pain  There was no repeat fall  The rib pain fluctuates  She has some pain with rolling over in bed and taking deep breaths  The pain is achy, mostly on the RIGHT side and RIGHT middle back region    Review of Systems   Constitutional: Negative for chills and fever.   Respiratory: Negative for shortness of breath.    Cardiovascular: Negative for chest pain.   Gastrointestinal: Negative for nausea and vomiting.   Neurological: Negative for dizziness and headaches.     PMH:  has a past medical history of Anemia; Anxiety; Cold; Dental disorder; Hypertension; Pain (2014); and Psychiatric problem. She also has no past medical history of CAD (coronary artery disease); COPD; Liver disease; or Sickle cell disease (HCC).  MEDS:   Current Outpatient Prescriptions:   •  multivitamin (THERAGRAN) Tab, Take 1 Tab by mouth every day., Disp: , Rfl:   •  omeprazole (PRILOSEC) 40 MG delayed-release capsule, Take 1 Cap by  mouth every day., Disp: 30 Cap, Rfl: 3  •  lisinopril-hydrochlorothiazide (PRINZIDE, ZESTORETIC) 20-12.5 MG per tablet, Take 1 Tab by mouth every morning., Disp: , Rfl:   •  citalopram (CELEXA) 20 MG Tab, Take 20 mg by mouth every morning., Disp: , Rfl:   •  acetaminophen (TYLENOL) 500 MG Tab, Take 500-1,000 mg by mouth every 6 hours as needed., Disp: , Rfl:   •  tramadol-acetaminophen (ULTRACET) 37.5-325 MG per tablet, Take 1 Tab by mouth every 6 hours as needed for Mild Pain or Moderate Pain for up to 14 days., Disp: 30 Tab, Rfl: 0  •  valacyclovir (VALTREX) 1 GM Tab, Take 1 Tab by mouth 2 times a day., Disp: 10 Tab, Rfl: 0  •  lidocaine (XYLOCAINE) 5 % Ointment, Apply to affected area up to QID prn., Disp: 30 g, Rfl: 0  •  cyclobenzaprine (FLEXERIL) 10 MG Tab, Take 1 Tab by mouth 3 times a day as needed., Disp: 30 Tab, Rfl: 0  ALLERGIES:   Allergies   Allergen Reactions   • Aspirin Vomiting   • Other Misc      Waterproof band aids took off skin and caused infection      SURGHX:   Past Surgical History:   Procedure Laterality Date   • GASTROSCOPY  5/17/2017    Procedure: GASTROSCOPY W/DILATATION, 30-MM ACHALASIA;  Surgeon: Keegan Hernandez M.D.;  Location: [facility];  Service:    • GASTRIC SLEEVE LAPAROSCOPY N/A 2/3/2016    Procedure: GASTRIC SLEEVE LAPAROSCOPY AND LIVER BIOPSY;  Surgeon: Keegan Hernandez M.D.;  Location: [facility];  Service:    • KNEE ARTHROSCOPY  12/10/2014    Performed by Mikel Spencer M.D. at [facility]   • MEDIAL MENISCECTOMY  12/10/2014    Performed by Mikel Spencer M.D. at [facility]   • MENISCECTOMY  12/10/2014    Performed by Mikel Spencer M.D. at [facility]   • LATERAL RELEASE  12/10/2014    Performed by Mikel Spencer M.D. at [facility]   • TYMPANOPLASTY  11/6/2012    Performed by Jonn Kaufman M.D. at SURGERY SAME DAY [facility]   • TONSILLECTOMY     • TUBAL LIGATION       SOCHX:   "reports that she quit smoking about 5 months ago. Her smoking use included Cigarettes. She has a 12.00 pack-year smoking history. She has never used smokeless tobacco. She reports that she drinks alcohol. She reports that she does not use drugs.  FH: Family history was reviewed, no pertinent findings to report       Objective:     /82   Pulse 74   Temp 36.2 °C (97.2 °F)   Resp 16   Ht 1.727 m (5' 7.99\")   Wt (!) 147.9 kg (326 lb)   SpO2 96%   BMI 49.58 kg/m²      Physical Exam       LEFT ANKLE:  There is POSITIVE swelling noted at the ankle  Range of motion LIMITED with dorsiflexion and plantarflexion, inversion and eversion  There is POSITIVE tenderness of the ATFL and CF without tenderness of the PTf ligament  There is POSITIVE tenderness of the lateral malleolus without tenderness of the medial malleolus  Anterior drawer testing is POSITIVE  Talar tilt testing is NEGATIVE  The foot and ankle is otherwise neurovascularly intact    LEFT FOOT:  There is NO swelling noted at the foot  There is NO tenderness at the base of the fifth metatarsal, cuboid, or tarsal navicular  There is NO pain with metatarsal squeeze test  MINIMAL tenderness at the insertion of the plantar fascia on the LEFT foot    NEUTRAL stance  Able to ambulate with ANTALGIC gait N Cam Walker boot    POSITIVE muscle tension/trigger point at the RIGHT upper back/periscapular region  Some discomfort along the eighth rib on the RIGHT without any point tenderness, crepitus  No labored breathing       Assessment/Plan:     1. Closed traumatic nondisplaced fracture of distal end of left fibula, initial encounter  DX-ANKLE 3+ VIEWS LEFT   2. Rib pain on right side     3. Plantar fasciitis, right       Stable fracture, no mortise widening  Continue stabilization in the Cam Walker boot    The plan is to continue Cam Walker boot for a total of approximately 6 weeks (removal on or after June 26, 2018)    She plans on traveling to [facility] in 2 " weeks for a week    Return in about 2 weeks (around 6/8/2018). For fracture check    REPEAT images May 25, 2018 in the office, demonstrate minimal widening of the fracture, however x-ray was taken at a slightly different angle which may have affected the view, essentially no change in fracture.                  5/12/2018 10:26 AM    HISTORY/REASON FOR EXAM:  Pain/Deformity Following Trauma      TECHNIQUE/EXAM DESCRIPTION AND NUMBER OF VIEWS:  3 views of the LEFT ankle.    COMPARISON: None    FINDINGS:    There is a comminuted fracture in the distal left fibula just superior to the ankle mortise. No significant widening of the medial mortise is appreciated. No osteochondral lesion is identified.  Soft tissue swelling overlies the fracture site.   Enthesophytes project from the calcaneus.   Impression         Comminuted distal fibular fracture with overlying soft tissue swelling.     Interpreted in the office today with the patient    Thank you Ronny Monroy M.D. for allowing me to participate in caring for your patient.

## 2018-05-25 NOTE — LETTER
May 25, 2018         Patient: Nneka Ruiz   YOB: 1970   Date of Visit: 5/25/2018           To Whom it May Concern:    Nneka Ruiz was seen in my clinic on 5/25/2018.  Recommend avoiding the gym secondary to orthopedic injury until cleared by physician (suspect 6-8 weeks recovery).    If you have any questions or concerns, please don't hesitate to call.        Sincerely,           Parvez Han M.D.  Electronically Signed

## 2018-06-11 ENCOUNTER — OFFICE VISIT (OUTPATIENT)
Dept: MEDICAL GROUP | Facility: CLINIC | Age: 48
End: 2018-06-11

## 2018-06-11 VITALS
DIASTOLIC BLOOD PRESSURE: 80 MMHG | OXYGEN SATURATION: 97 % | HEIGHT: 68 IN | HEART RATE: 76 BPM | WEIGHT: 293 LBS | RESPIRATION RATE: 16 BRPM | BODY MASS INDEX: 44.41 KG/M2 | SYSTOLIC BLOOD PRESSURE: 124 MMHG | TEMPERATURE: 97.8 F

## 2018-06-11 DIAGNOSIS — S82.832A CLOSED TRAUMATIC NONDISPLACED FRACTURE OF DISTAL END OF LEFT FIBULA, INITIAL ENCOUNTER: ICD-10-CM

## 2018-06-11 PROCEDURE — 99024 POSTOP FOLLOW-UP VISIT: CPT | Performed by: FAMILY MEDICINE

## 2018-06-11 ASSESSMENT — ENCOUNTER SYMPTOMS
DIZZINESS: 0
FEVER: 0
SHORTNESS OF BREATH: 0
CHILLS: 0
NAUSEA: 0
VOMITING: 0
HEADACHES: 0

## 2018-06-11 NOTE — PROGRESS NOTES
Subjective:      Nneka Ruiz is a 47 y.o. female who presents with Ankle Injury (F/V L ankle injury )      Referred by Ronny Monroy M.D. for evaluation of LEFT ankle pain    HPI   LEFT ankle distal fibula fracture  Date of injury Saturday, May 12, 2018  Mechanism of injury, inversion injury after she tripped falling down the stairs, then fall forward (4 steps)    Had a NEW episode few days ago rotating in her boot  Caused a lot of pain, but has been walking in cam walker without issue  Improved with immobilization and rest    Review of Systems   Constitutional: Negative for chills and fever.   Respiratory: Negative for shortness of breath.    Cardiovascular: Negative for chest pain.   Gastrointestinal: Negative for nausea and vomiting.   Neurological: Negative for dizziness and headaches.     PMH:  has a past medical history of Anemia; Anxiety; Cold; Dental disorder; Hypertension; Pain (2014); and Psychiatric problem. She also has no past medical history of CAD (coronary artery disease); COPD; Liver disease; or Sickle cell disease (HCC).  MEDS:   Current Outpatient Prescriptions:   •  valacyclovir (VALTREX) 1 GM Tab, Take 1 Tab by mouth 2 times a day., Disp: 10 Tab, Rfl: 0  •  lidocaine (XYLOCAINE) 5 % Ointment, Apply to affected area up to QID prn., Disp: 30 g, Rfl: 0  •  cyclobenzaprine (FLEXERIL) 10 MG Tab, Take 1 Tab by mouth 3 times a day as needed., Disp: 30 Tab, Rfl: 0  •  multivitamin (THERAGRAN) Tab, Take 1 Tab by mouth every day., Disp: , Rfl:   •  omeprazole (PRILOSEC) 40 MG delayed-release capsule, Take 1 Cap by mouth every day., Disp: 30 Cap, Rfl: 3  •  lisinopril-hydrochlorothiazide (PRINZIDE, ZESTORETIC) 20-12.5 MG per tablet, Take 1 Tab by mouth every morning., Disp: , Rfl:   •  citalopram (CELEXA) 20 MG Tab, Take 20 mg by mouth every morning., Disp: , Rfl:   •  acetaminophen (TYLENOL) 500 MG Tab, Take 500-1,000 mg by mouth every 6 hours as needed., Disp: , Rfl:   ALLERGIES:    "  Allergies   Allergen Reactions   • Aspirin Vomiting   • Other Misc      Waterproof band aids took off skin and caused infection      SURGHX:   Past Surgical History:   Procedure Laterality Date   • GASTROSCOPY  5/17/2017    Procedure: GASTROSCOPY W/DILATATION, 30-MM ACHALASIA;  Surgeon: Keegan Hernandez M.D.;  Location: SURGERY [facility];  Service:    • GASTRIC SLEEVE LAPAROSCOPY N/A 2/3/2016    Procedure: GASTRIC SLEEVE LAPAROSCOPY AND LIVER BIOPSY;  Surgeon: Keegan Hernandez M.D.;  Location: SURGERY [facility];  Service:    • KNEE ARTHROSCOPY  12/10/2014    Performed by Mikel Spenecr M.D. at [facility]   • MEDIAL MENISCECTOMY  12/10/2014    Performed by Mikel Spencer M.D. at [facility]   • MENISCECTOMY  12/10/2014    Performed by Mikel Spencer M.D. at [facility]   • LATERAL RELEASE  12/10/2014    Performed by Mikel Spencer M.D. at [facility]   • TYMPANOPLASTY  11/6/2012    Performed by Jonn Kaufman M.D. at SURGERY SAME DAY [facility]   • TONSILLECTOMY     • TUBAL LIGATION       SOCHX:  reports that she quit smoking about 6 months ago. Her smoking use included Cigarettes. She has a 12.00 pack-year smoking history. She has never used smokeless tobacco. She reports that she drinks alcohol. She reports that she does not use drugs.  FH: Family history was reviewed, no pertinent findings to report       Objective:     /80   Pulse 76   Temp 36.6 °C (97.8 °F)   Resp 16   Ht 1.727 m (5' 7.99\")   Wt (!) 147.9 kg (326 lb)   SpO2 97%   BMI 49.58 kg/m²      Physical Exam       LEFT ANKLE:  There is MINIMAL swelling noted at the ankle  Range of motion LIMITED with dorsiflexion and plantarflexion, inversion and eversion to about 80%  There is MINIMAL tenderness of the ATFL and CF without tenderness of the PTf ligament  There is MODERATE tenderness of the lateral malleolus without tenderness of the medial malleolus  Talar tilt " testing is NEGATIVE  The foot and ankle is otherwise neurovascularly intact       Assessment/Plan:     1. Closed traumatic nondisplaced fracture of distal end of left fibula, initial encounter          Stable fracture, no mortise widening  Continue stabilization in the Cam Walker boot    The plan is to continue Cam Walker boot for a total of approximately 6 weeks (removal on or after June 26, 2018)    No Follow-up on file. For fracture check    REPEAT images May 25, 2018 in the office, demonstrate minimal widening of the fracture, however x-ray was taken at a slightly different angle which may have affected the view, essentially no change in fracture.                  5/12/2018 10:26 AM    HISTORY/REASON FOR EXAM:  Pain/Deformity Following Trauma      TECHNIQUE/EXAM DESCRIPTION AND NUMBER OF VIEWS:  3 views of the LEFT ankle.    COMPARISON: None    FINDINGS:    There is a comminuted fracture in the distal left fibula just superior to the ankle mortise. No significant widening of the medial mortise is appreciated. No osteochondral lesion is identified.  Soft tissue swelling overlies the fracture site.   Enthesophytes project from the calcaneus.   Impression         Comminuted distal fibular fracture with overlying soft tissue swelling.     Thank you Ronny Monroy M.D. for allowing me to participate in caring for your patient.

## 2018-06-14 ENCOUNTER — APPOINTMENT (OUTPATIENT)
Dept: RADIOLOGY | Facility: IMAGING CENTER | Age: 48
End: 2018-06-14
Attending: FAMILY MEDICINE
Payer: COMMERCIAL

## 2018-06-14 ENCOUNTER — OFFICE VISIT (OUTPATIENT)
Dept: MEDICAL GROUP | Facility: CLINIC | Age: 48
End: 2018-06-14
Payer: COMMERCIAL

## 2018-06-14 VITALS
SYSTOLIC BLOOD PRESSURE: 124 MMHG | HEART RATE: 78 BPM | HEIGHT: 68 IN | BODY MASS INDEX: 44.41 KG/M2 | WEIGHT: 293 LBS | TEMPERATURE: 98.2 F | RESPIRATION RATE: 16 BRPM | DIASTOLIC BLOOD PRESSURE: 80 MMHG | OXYGEN SATURATION: 96 %

## 2018-06-14 DIAGNOSIS — S82.832G: ICD-10-CM

## 2018-06-14 PROCEDURE — 99024 POSTOP FOLLOW-UP VISIT: CPT | Performed by: FAMILY MEDICINE

## 2018-06-14 PROCEDURE — 73610 X-RAY EXAM OF ANKLE: CPT | Mod: TC,LT | Performed by: FAMILY MEDICINE

## 2018-06-14 ASSESSMENT — ENCOUNTER SYMPTOMS
HEADACHES: 0
NAUSEA: 0
CHILLS: 0
DIZZINESS: 0
SHORTNESS OF BREATH: 0
FEVER: 0
VOMITING: 0

## 2018-06-14 NOTE — PROGRESS NOTES
Subjective:      Nneka Ruiz is a 47 y.o. female who presents with Ankle Injury (F/V L ankle injury )      Referred by Ronny Monroy M.D. for evaluation of LEFT ankle pain    Ankle Injury         LEFT ankle distal fibula fracture  Date of injury Saturday, May 12, 2018  Mechanism of injury, inversion injury after she tripped falling down the stairs, then fall forward (4 steps)    Repeat pain yesterday, no injury  a lot of pain, and burning sensation but has been walking in cam walker without issue  Improved with immobilization and rest    Review of Systems   Constitutional: Negative for chills and fever.   Respiratory: Negative for shortness of breath.    Cardiovascular: Negative for chest pain.   Gastrointestinal: Negative for nausea and vomiting.   Neurological: Negative for dizziness and headaches.     PMH:  has a past medical history of Anemia; Anxiety; Cold; Dental disorder; Hypertension; Pain (2014); and Psychiatric problem. She also has no past medical history of CAD (coronary artery disease); COPD; Liver disease; or Sickle cell disease (HCC).  MEDS:   Current Outpatient Prescriptions:   •  valacyclovir (VALTREX) 1 GM Tab, Take 1 Tab by mouth 2 times a day., Disp: 10 Tab, Rfl: 0  •  lidocaine (XYLOCAINE) 5 % Ointment, Apply to affected area up to QID prn., Disp: 30 g, Rfl: 0  •  cyclobenzaprine (FLEXERIL) 10 MG Tab, Take 1 Tab by mouth 3 times a day as needed., Disp: 30 Tab, Rfl: 0  •  multivitamin (THERAGRAN) Tab, Take 1 Tab by mouth every day., Disp: , Rfl:   •  omeprazole (PRILOSEC) 40 MG delayed-release capsule, Take 1 Cap by mouth every day., Disp: 30 Cap, Rfl: 3  •  lisinopril-hydrochlorothiazide (PRINZIDE, ZESTORETIC) 20-12.5 MG per tablet, Take 1 Tab by mouth every morning., Disp: , Rfl:   •  citalopram (CELEXA) 20 MG Tab, Take 20 mg by mouth every morning., Disp: , Rfl:   •  acetaminophen (TYLENOL) 500 MG Tab, Take 500-1,000 mg by mouth every 6 hours as needed., Disp: , Rfl:   ALLERGIES:  "  Allergies   Allergen Reactions   • Aspirin Vomiting   • Other Misc      Waterproof band aids took off skin and caused infection      SURGHX:   Past Surgical History:   Procedure Laterality Date   • GASTROSCOPY  5/17/2017    Procedure: GASTROSCOPY W/DILATATION, 30-MM ACHALASIA;  Surgeon: Keegan Hernandez M.D.;  Location: SURGERY [facility];  Service:    • GASTRIC SLEEVE LAPAROSCOPY N/A 2/3/2016    Procedure: GASTRIC SLEEVE LAPAROSCOPY AND LIVER BIOPSY;  Surgeon: Keegan Hernandez M.D.;  Location: SURGERY [facility];  Service:    • KNEE ARTHROSCOPY  12/10/2014    Performed by Mikel Spencer M.D. at [facility]   • MEDIAL MENISCECTOMY  12/10/2014    Performed by Mikel Spencer M.D. at [facility]   • MENISCECTOMY  12/10/2014    Performed by Mikel Spencer M.D. at [facility]   • LATERAL RELEASE  12/10/2014    Performed by Mikel Spencer M.D. at [facility]   • TYMPANOPLASTY  11/6/2012    Performed by Jonn Kaufman M.D. at SURGERY [facility]   • TONSILLECTOMY     • TUBAL LIGATION       SOCHX:  reports that she quit smoking about 6 months ago. Her smoking use included Cigarettes. She has a 12.00 pack-year smoking history. She has never used smokeless tobacco. She reports that she drinks alcohol. She reports that she does not use drugs.  FH: Family history was reviewed, no pertinent findings to report       Objective:     /80   Pulse 78   Temp 36.8 °C (98.2 °F)   Resp 16   Ht 1.727 m (5' 7.99\")   Wt (!) 147.9 kg (326 lb)   SpO2 96%   BMI 49.58 kg/m²      Physical Exam       LEFT ANKLE:  There is MINIMAL swelling noted at the ankle  Range of motion LIMITED with dorsiflexion and plantarflexion, inversion and eversion to about 80%  There is MINIMAL tenderness of the ATFL and CF without tenderness of the PTf ligament  There is MINIMAL tenderness of the lateral malleolus without tenderness of the medial malleolus  Talar tilt " testing is NEGATIVE  The foot and ankle is otherwise neurovascularly intact       Assessment/Plan:     1. Closed traumatic nondisplaced fracture of distal end of left fibula with delayed healing, subsequent encounter        Stable fracture, no mortise widening  REPEAT x-rays, 3rd set demonstrate stability  Continue stabilization in the Cam Walker boot    Offered casting since she is in so much pain  She seems comfortable and declined the offer  Advised to avoid any painful activities    The plan is to continue Cam Walker boot for a total of approximately 6 weeks (removal on or after June 26, 2018)  We can extend 2 weeks (around July 10, 2018) if pain persists    Return in about 11 days (around 6/25/2018). For fracture check                  5/12/2018 10:26 AM    HISTORY/REASON FOR EXAM:  Pain/Deformity Following Trauma      TECHNIQUE/EXAM DESCRIPTION AND NUMBER OF VIEWS:  3 views of the LEFT ankle.    COMPARISON: None    FINDINGS:    There is a comminuted fracture in the distal left fibula just superior to the ankle mortise. No significant widening of the medial mortise is appreciated. No osteochondral lesion is identified.  Soft tissue swelling overlies the fracture site.   Enthesophytes project from the calcaneus.   Impression         Comminuted distal fibular fracture with overlying soft tissue swelling.     Thank you Ronny Monroy M.D. for allowing me to participate in caring for your patient.

## 2018-06-15 ENCOUNTER — OFFICE VISIT (OUTPATIENT)
Dept: URGENT CARE | Facility: PHYSICIAN GROUP | Age: 48
End: 2018-06-15
Payer: COMMERCIAL

## 2018-06-15 VITALS
HEART RATE: 88 BPM | RESPIRATION RATE: 16 BRPM | HEIGHT: 68 IN | WEIGHT: 293 LBS | SYSTOLIC BLOOD PRESSURE: 128 MMHG | TEMPERATURE: 99.2 F | OXYGEN SATURATION: 98 % | BODY MASS INDEX: 44.41 KG/M2 | DIASTOLIC BLOOD PRESSURE: 82 MMHG

## 2018-06-15 DIAGNOSIS — J01.00 ACUTE MAXILLARY SINUSITIS, RECURRENCE NOT SPECIFIED: ICD-10-CM

## 2018-06-15 PROCEDURE — 99214 OFFICE O/P EST MOD 30 MIN: CPT | Performed by: FAMILY MEDICINE

## 2018-06-15 RX ORDER — FLUTICASONE PROPIONATE 50 MCG
1 SPRAY, SUSPENSION (ML) NASAL DAILY
Qty: 1 BOTTLE | Refills: 0 | Status: SHIPPED | OUTPATIENT
Start: 2018-06-15 | End: 2018-07-18

## 2018-06-15 RX ORDER — BENZONATATE 200 MG/1
200 CAPSULE ORAL 3 TIMES DAILY PRN
Qty: 45 CAP | Refills: 0 | Status: SHIPPED | OUTPATIENT
Start: 2018-06-15 | End: 2018-07-18

## 2018-06-15 RX ORDER — AMOXICILLIN AND CLAVULANATE POTASSIUM 875; 125 MG/1; MG/1
1 TABLET, FILM COATED ORAL 2 TIMES DAILY
Qty: 14 TAB | Refills: 0 | Status: SHIPPED | OUTPATIENT
Start: 2018-06-15 | End: 2018-06-22

## 2018-06-15 NOTE — PROGRESS NOTES
Chief Complaint   Patient presents with   • Sinus Problem       Cough  This is a new problem. The current episode started 10 days ago. The problem has been gradually worsening. The problem occurs constantly. The cough is productive of yellow sputum. Associated symptoms include : sinus headaches, fatigue, nasal congestion, bilat ear pain.    objective fevers at home, Tmax 102.    States cough is making it hard to sleep at night.   Pertinent negatives include no   nausea, vomiting, diarrhea, sweats, weight loss or wheezing. Nothing aggravates the symptoms.  Patient has tried several OTC cold products for the symptoms - no improvement. There is no history of asthma.        Past Medical History:   Diagnosis Date   • Anemia     during pregnancy   • Anxiety    • Cold    • Dental disorder     lower partial   • Hypertension    • Pain 2014    left knee   • Psychiatric problem     anxiety         Social History   Substance Use Topics   • Smoking status: Former Smoker     Packs/day: 1.00     Years: 12.00     Types: Cigarettes     Quit date: 12/1/2017   • Smokeless tobacco: Never Used      Comment: 2 yrs  1/2 ppd   • Alcohol use 0.0 oz/week      Comment: 0-2 drinks per month             Review of Systems   Constitutional: Negative for fever, chills and weight loss.   HENT - + ear pain.   Positive congestion, sore throat  Eyes: denies vision changes, discharge  Respiratory: Negative for hemoptysis and wheezing.    Cardiovascular: Negative for chest pain or PND.   Gastrointestinal:  No abdominal pain,  nausea, vomiting, diarrhea.  Negative for  blood in stool.    - no discharge, dysuria, frequency.      Neurological: Negative for dizziness.   + headaches.   musculoskeletal - denies myalgias, calf pain  Psych - denies anxiety/depression/mood changes.  Skin: no itching or rash  All other systems reviewed and are negative.             Objective:     Blood pressure 128/82, pulse 88, temperature 37.3 °C (99.2 °F), resp. rate 16,  "height 1.727 m (5' 8\"), weight (!) 147.9 kg (326 lb), SpO2 98 %.    Physical Exam   Constitutional: patient is oriented to person, place, and time. Patient appears well-developed and well-nourished. No distress.   HENT:   Head: Normocephalic and atraumatic.   Right Ear: External ear normal. Tympanostomy tube in place.  TM is otherwise clear  Left Ear: External ear normal.  TM clear  TMs both normal  Nose: Mucosal edema  present.   There is tenderness to percussion over left and right sinuses  Mouth/Throat: Mucous membranes are normal. No oral lesions.  No posterior pharyngeal erythema.  No oropharyngeal exudate or posterior oropharyngeal edema.   Eyes: Conjunctivae and EOM are normal. Pupils are equal, round, and reactive to light. Right eye exhibits no discharge. Left eye exhibits no discharge. No scleral icterus.   Neck: Normal range of motion. Neck supple. No tracheal deviation present.   Cardiovascular: Normal rate, regular rhythm and normal heart sounds.  Exam reveals no friction rub.    Pulmonary/Chest: Effort normal. No respiratory distress. Patient has no wheezes or rhonchi. Patient has no rales.    Musculoskeletal:  exhibits no edema.   Lymphadenopathy:     Patient has  cervical adenopathy.      Neurological: patient is alert and oriented to person, place, and time.   CN 2-12 intact  Skin: Skin is warm and dry. No rash noted. No erythema.   Psychiatric: patient  has a normal mood and affect.  behavior is normal.   Nursing note and vitals reviewed.              Assessment/Plan:           1. Acute maxillary sinusitis, recurrence not specified     - amoxicillin-clavulanate (AUGMENTIN) 875-125 MG Tab; Take 1 Tab by mouth 2 times a day for 7 days.  Dispense: 14 Tab; Refill: 0  - fluticasone (FLONASE) 50 MCG/ACT nasal spray; Spray 1 Spray in nose every day.  Dispense: 1 Bottle; Refill: 0  - benzonatate (TESSALON) 200 MG capsule; Take 1 Cap by mouth 3 times a day as needed for Cough.  Dispense: 45 Cap; Refill: " 0        Follow up in one week if no improvement, sooner if symptoms worsen.

## 2018-06-19 ENCOUNTER — OFFICE VISIT (OUTPATIENT)
Dept: URGENT CARE | Facility: PHYSICIAN GROUP | Age: 48
End: 2018-06-19
Payer: COMMERCIAL

## 2018-06-19 VITALS
BODY MASS INDEX: 44.41 KG/M2 | OXYGEN SATURATION: 99 % | WEIGHT: 293 LBS | DIASTOLIC BLOOD PRESSURE: 82 MMHG | TEMPERATURE: 99.3 F | SYSTOLIC BLOOD PRESSURE: 124 MMHG | RESPIRATION RATE: 16 BRPM | HEIGHT: 68 IN | HEART RATE: 92 BPM

## 2018-06-19 DIAGNOSIS — J98.01 BRONCHOSPASM: ICD-10-CM

## 2018-06-19 DIAGNOSIS — R05.9 COUGH: ICD-10-CM

## 2018-06-19 PROCEDURE — 99214 OFFICE O/P EST MOD 30 MIN: CPT | Performed by: EMERGENCY MEDICINE

## 2018-06-19 RX ORDER — FLUTICASONE PROPIONATE 220 UG/1
1 AEROSOL, METERED RESPIRATORY (INHALATION) 2 TIMES DAILY
Qty: 1 INHALER | Refills: 0 | Status: SHIPPED | OUTPATIENT
Start: 2018-06-19 | End: 2019-03-01 | Stop reason: CLARIF

## 2018-06-19 RX ORDER — DOXYCYCLINE HYCLATE 100 MG
100 TABLET ORAL 2 TIMES DAILY
Qty: 14 TAB | Refills: 0 | Status: SHIPPED | OUTPATIENT
Start: 2018-06-19 | End: 2018-07-18

## 2018-06-19 ASSESSMENT — ENCOUNTER SYMPTOMS
VOMITING: 1
SORE THROAT: 0
ABDOMINAL PAIN: 0
NAUSEA: 1
RHINORRHEA: 0
FOCAL WEAKNESS: 0
SINUS PAIN: 0
SPUTUM PRODUCTION: 1
COUGH: 1
FEVER: 1
SENSORY CHANGE: 0
HEMOPTYSIS: 0
PALPITATIONS: 0
FLANK PAIN: 0
DIZZINESS: 1
BLOOD IN STOOL: 0
SHORTNESS OF BREATH: 0
WHEEZING: 1
DIARRHEA: 0

## 2018-06-19 NOTE — PROGRESS NOTES
Subjective:      Nneka Ruiz is a 47 y.o. female who presents with URI            Cough   This is a new problem. Episode onset: 2 weeks. The problem has been unchanged. The cough is productive of purulent sputum. Associated symptoms include chest pain, ear congestion, a fever and wheezing. Pertinent negatives include no ear pain, hemoptysis, nasal congestion, postnasal drip, rash, rhinorrhea, sore throat or shortness of breath. Treatments tried: Augmentin. Her past medical history is significant for environmental allergies.   Patient notes improvement with sinus symptoms after a course of Augmentin started; continued coughing, fever. PMH recent left ankle fracture issues; improved after last visit to treating physician.    Review of Systems   Constitutional: Positive for fever and malaise/fatigue.   HENT: Negative for ear discharge, ear pain, nosebleeds, postnasal drip, rhinorrhea, sinus pain and sore throat.    Respiratory: Positive for cough, sputum production and wheezing. Negative for hemoptysis and shortness of breath.    Cardiovascular: Positive for chest pain. Negative for palpitations and leg swelling.        Anterior chest pain associated with coughing only   Gastrointestinal: Positive for nausea and vomiting. Negative for abdominal pain, blood in stool and diarrhea.        A few days ago; resolved   Genitourinary: Negative for dysuria, flank pain, frequency, hematuria and urgency.        No vaginal discharge or bleeding.   Skin: Negative for rash.   Neurological: Positive for dizziness. Negative for sensory change and focal weakness.   Endo/Heme/Allergies: Positive for environmental allergies.     PMH:  has a past medical history of Anemia; Anxiety; Cold; Dental disorder; Hypertension; Pain (2014); and Psychiatric problem. She also has no past medical history of CAD (coronary artery disease); COPD; Liver disease; or Sickle cell disease (HCC).  MEDS:   Current Outpatient Prescriptions:   •   fluticasone (FLOVENT HFA) 220 MCG/ACT Aerosol, Inhale 1 Puff by mouth 2 times a day., Disp: 1 Inhaler, Rfl: 0  •  Albuterol Sulfate 108 (90 Base) MCG/ACT AEROSOL POWDER, BREATH ACTIVATED, Inhale 1-2 Puffs by mouth every 6 hours as needed (coughing, wheezing)., Disp: 1 Each, Rfl: 0  •  amoxicillin-clavulanate (AUGMENTIN) 875-125 MG Tab, Take 1 Tab by mouth 2 times a day for 7 days., Disp: 14 Tab, Rfl: 0  •  fluticasone (FLONASE) 50 MCG/ACT nasal spray, Spray 1 Spray in nose every day., Disp: 1 Bottle, Rfl: 0  •  benzonatate (TESSALON) 200 MG capsule, Take 1 Cap by mouth 3 times a day as needed for Cough., Disp: 45 Cap, Rfl: 0  •  valacyclovir (VALTREX) 1 GM Tab, Take 1 Tab by mouth 2 times a day., Disp: 10 Tab, Rfl: 0  •  lidocaine (XYLOCAINE) 5 % Ointment, Apply to affected area up to QID prn., Disp: 30 g, Rfl: 0  •  cyclobenzaprine (FLEXERIL) 10 MG Tab, Take 1 Tab by mouth 3 times a day as needed., Disp: 30 Tab, Rfl: 0  •  multivitamin (THERAGRAN) Tab, Take 1 Tab by mouth every day., Disp: , Rfl:   •  omeprazole (PRILOSEC) 40 MG delayed-release capsule, Take 1 Cap by mouth every day., Disp: 30 Cap, Rfl: 3  •  lisinopril-hydrochlorothiazide (PRINZIDE, ZESTORETIC) 20-12.5 MG per tablet, Take 1 Tab by mouth every morning., Disp: , Rfl:   •  citalopram (CELEXA) 20 MG Tab, Take 20 mg by mouth every morning., Disp: , Rfl:   •  acetaminophen (TYLENOL) 500 MG Tab, Take 500-1,000 mg by mouth every 6 hours as needed., Disp: , Rfl:   ALLERGIES:   Allergies   Allergen Reactions   • Aspirin Vomiting   • Other Misc      Waterproof band aids took off skin and caused infection      SURGHX:   Past Surgical History:   Procedure Laterality Date   • GASTROSCOPY  5/17/2017    Procedure: GASTROSCOPY W/DILATATION, 30-MM ACHALASIA;  Surgeon: Keegan Hernandez M.D.;  Location: SURGERY [facility];  Service:    • GASTRIC SLEEVE LAPAROSCOPY N/A 2/3/2016    Procedure: GASTRIC SLEEVE LAPAROSCOPY AND LIVER BIOPSY;  Surgeon: Keegan Hernandez M.D.;   "Location: SURGERY [facility];  Service:    • KNEE ARTHROSCOPY  12/10/2014    Performed by Mikel Spencer M.D. at SURGERY [facility]   • MEDIAL MENISCECTOMY  12/10/2014    Performed by Mikel Spencer M.D. at [facility]   • MENISCECTOMY  12/10/2014    Performed by Mikel Spencer M.D. at [facility]   • LATERAL RELEASE  12/10/2014    Performed by Mikel Spencer M.D. at [facility]   • TYMPANOPLASTY  11/6/2012    Performed by Jonn Kaufman M.D. at SURGERY SAME DAY [facility]   • TONSILLECTOMY     • TUBAL LIGATION       SOCHX:  reports that she quit smoking about 6 months ago. Her smoking use included Cigarettes. She has a 12.00 pack-year smoking history. She has never used smokeless tobacco. She reports that she drinks alcohol. She reports that she does not use drugs.  FH: family history is not on file.       Objective:     /82   Pulse 92   Temp 37.4 °C (99.3 °F)   Resp 16   Ht 1.727 m (5' 8\")   Wt (!) 147.9 kg (326 lb)   SpO2 99%   BMI 49.57 kg/m²      Physical Exam   Constitutional: She is oriented to person, place, and time. She appears well-developed and well-nourished. She is cooperative. She does not have a sickly appearance. She does not appear ill. No distress.   HENT:   Head: Normocephalic.   Right Ear: Ear canal normal. No drainage. No mastoid tenderness.   Left Ear: Ear canal normal. No mastoid tenderness.  No middle ear effusion.   Nose: Mucosal edema present. No rhinorrhea. Right sinus exhibits no maxillary sinus tenderness and no frontal sinus tenderness. Left sinus exhibits no maxillary sinus tenderness and no frontal sinus tenderness.   Mouth/Throat: Uvula is midline and oropharynx is clear and moist.   Right ventilation tube in place without drainage.   Eyes: Conjunctivae and lids are normal.   Neck: Trachea normal and phonation normal. Neck supple.   Cardiovascular: Normal rate, regular rhythm and normal heart " sounds.    No left calf tenderness, Homans sign negative.   Pulmonary/Chest: No tachypnea. No respiratory distress. She has no decreased breath sounds. She has no wheezes. She has no rhonchi. She has no rales.   Abdominal: Soft. Bowel sounds are normal. There is no tenderness.   Musculoskeletal:        Left ankle: She exhibits swelling.   Lymphadenopathy:     She has no cervical adenopathy.   Neurological: She is alert and oriented to person, place, and time.   Distal sensation to light touch and pressure intact.   Skin: Skin is warm, dry and intact. No rash noted.   Psychiatric: She has a normal mood and affect.       Avoiding oral steroids due to nonhealing fracture issue.    Advised patient by phone of x-ray report results.  Suspect continued fevers associated with inflammatory process versus unresolved sinusitis; will switch to doxycycline.     Assessment/Plan:     1. Bronchospasm  - fluticasone (FLOVENT HFA) 220 MCG/ACT Aerosol; Inhale 1 Puff by mouth 2 times a day.  Dispense: 1 Inhaler; Refill: 0  - Albuterol Sulfate 108 (90 Base) MCG/ACT AEROSOL POWDER, BREATH ACTIVATED; Inhale 1-2 Puffs by mouth every 6 hours as needed (coughing, wheezing).  Dispense: 1 Each; Refill: 0    2. Cough  - DX-CHEST-2 VIEWS; per radiologist:  Normal chest.  May have continued PND component:  Rx doxycycline instead of Augmentin  REF PCP

## 2018-06-25 ENCOUNTER — OFFICE VISIT (OUTPATIENT)
Dept: MEDICAL GROUP | Facility: CLINIC | Age: 48
End: 2018-06-25
Payer: COMMERCIAL

## 2018-06-25 ENCOUNTER — APPOINTMENT (OUTPATIENT)
Dept: RADIOLOGY | Facility: IMAGING CENTER | Age: 48
End: 2018-06-25
Attending: FAMILY MEDICINE
Payer: COMMERCIAL

## 2018-06-25 VITALS
HEART RATE: 92 BPM | SYSTOLIC BLOOD PRESSURE: 124 MMHG | HEIGHT: 68 IN | RESPIRATION RATE: 20 BRPM | BODY MASS INDEX: 44.41 KG/M2 | TEMPERATURE: 98.1 F | OXYGEN SATURATION: 94 % | WEIGHT: 293 LBS | DIASTOLIC BLOOD PRESSURE: 80 MMHG

## 2018-06-25 DIAGNOSIS — M25.872 DECREASED PROPRIOCEPTION OF JOINT OF FOOT, LEFT: ICD-10-CM

## 2018-06-25 DIAGNOSIS — S82.832G: ICD-10-CM

## 2018-06-25 PROCEDURE — 73610 X-RAY EXAM OF ANKLE: CPT | Mod: TC,LT | Performed by: FAMILY MEDICINE

## 2018-06-25 PROCEDURE — 99212 OFFICE O/P EST SF 10 MIN: CPT | Mod: 24 | Performed by: FAMILY MEDICINE

## 2018-06-25 ASSESSMENT — ENCOUNTER SYMPTOMS
HEADACHES: 0
FEVER: 0
VOMITING: 0
SHORTNESS OF BREATH: 0
DIZZINESS: 0
NAUSEA: 0
CHILLS: 0
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
## 2018-07-17 ENCOUNTER — HOSPITAL ENCOUNTER (EMERGENCY)
Facility: MEDICAL CENTER | Age: 48
End: 2018-07-19
Attending: EMERGENCY MEDICINE
Payer: COMMERCIAL

## 2018-07-17 DIAGNOSIS — R45.851 SUICIDAL IDEATION: ICD-10-CM

## 2018-07-17 DIAGNOSIS — R44.0 AUDITORY HALLUCINATIONS: ICD-10-CM

## 2018-07-17 DIAGNOSIS — H65.92 LEFT NON-SUPPURATIVE OTITIS MEDIA: ICD-10-CM

## 2018-07-17 DIAGNOSIS — H60.502 ACUTE OTITIS EXTERNA OF LEFT EAR, UNSPECIFIED TYPE: ICD-10-CM

## 2018-07-17 LAB
AMPHET UR QL SCN: NEGATIVE
BARBITURATES UR QL SCN: NEGATIVE
BENZODIAZ UR QL SCN: NEGATIVE
BZE UR QL SCN: NEGATIVE
CANNABINOIDS UR QL SCN: NEGATIVE
HCG UR QL: NEGATIVE
METHADONE UR QL SCN: NEGATIVE
OPIATES UR QL SCN: NEGATIVE
OXYCODONE UR QL SCN: NEGATIVE
PCP UR QL SCN: NEGATIVE
POC BREATHALIZER: 0 PERCENT (ref 0–0.01)
PROPOXYPH UR QL SCN: NEGATIVE
SP GR UR REFRACTOMETRY: 1.02

## 2018-07-17 PROCEDURE — 80307 DRUG TEST PRSMV CHEM ANLYZR: CPT

## 2018-07-17 PROCEDURE — 302970 POC BREATHALIZER: Performed by: EMERGENCY MEDICINE

## 2018-07-17 PROCEDURE — A9270 NON-COVERED ITEM OR SERVICE: HCPCS | Performed by: EMERGENCY MEDICINE

## 2018-07-17 PROCEDURE — 81025 URINE PREGNANCY TEST: CPT

## 2018-07-17 PROCEDURE — 302970 POC BREATHALIZER

## 2018-07-17 PROCEDURE — 700102 HCHG RX REV CODE 250 W/ 637 OVERRIDE(OP): Performed by: EMERGENCY MEDICINE

## 2018-07-17 PROCEDURE — 99285 EMERGENCY DEPT VISIT HI MDM: CPT

## 2018-07-17 PROCEDURE — 700101 HCHG RX REV CODE 250: Performed by: EMERGENCY MEDICINE

## 2018-07-17 PROCEDURE — 90791 PSYCH DIAGNOSTIC EVALUATION: CPT

## 2018-07-17 RX ORDER — ACETAMINOPHEN 325 MG/1
975 TABLET ORAL ONCE
Status: COMPLETED | OUTPATIENT
Start: 2018-07-17 | End: 2018-07-17

## 2018-07-17 RX ORDER — AMOXICILLIN 500 MG/1
500 CAPSULE ORAL EVERY 12 HOURS
Status: DISCONTINUED | OUTPATIENT
Start: 2018-07-17 | End: 2018-07-19 | Stop reason: HOSPADM

## 2018-07-17 RX ORDER — CIPROFLOXACIN AND DEXAMETHASONE 3; 1 MG/ML; MG/ML
4 SUSPENSION/ DROPS AURICULAR (OTIC) 2 TIMES DAILY
Status: DISCONTINUED | OUTPATIENT
Start: 2018-07-17 | End: 2018-07-19 | Stop reason: HOSPADM

## 2018-07-17 RX ORDER — IBUPROFEN 600 MG/1
600 TABLET ORAL ONCE
Status: COMPLETED | OUTPATIENT
Start: 2018-07-17 | End: 2018-07-17

## 2018-07-17 RX ORDER — PROMETHAZINE HYDROCHLORIDE 25 MG/1
25 TABLET ORAL ONCE
Status: COMPLETED | OUTPATIENT
Start: 2018-07-17 | End: 2018-07-17

## 2018-07-17 RX ADMIN — LIDOCAINE HYDROCHLORIDE 30 ML: 20 SOLUTION OROPHARYNGEAL at 16:14

## 2018-07-17 RX ADMIN — CIPROFLOXACIN AND DEXAMETHASONE 4 DROP: 3; 1 SUSPENSION/ DROPS AURICULAR (OTIC) at 18:07

## 2018-07-17 RX ADMIN — ACETAMINOPHEN 975 MG: 325 TABLET, FILM COATED ORAL at 16:13

## 2018-07-17 RX ADMIN — IBUPROFEN 600 MG: 600 TABLET, FILM COATED ORAL at 18:06

## 2018-07-17 RX ADMIN — PROMETHAZINE HYDROCHLORIDE 25 MG: 25 TABLET ORAL at 19:49

## 2018-07-17 RX ADMIN — AMOXICILLIN 500 MG: 500 CAPSULE ORAL at 18:06

## 2018-07-17 ASSESSMENT — PAIN SCALES - GENERAL: PAINLEVEL_OUTOF10: 10

## 2018-07-17 NOTE — ED NOTES
Does the patient present to the ED because of a fall? NO    Is the patient 70 yrs or older? NO    Does the patient have an altered mental status? NO    Does the patient have impaired mobility? NO    Does the nurse feel the patient is a fall risk due to bowel/bladder incontinence, diarrhea, urinary frequency/urgency, leg weakness, orthostatic hypotension, dizziness/vertigo, or narcotic use? NO    NO to all = Universal fall risk interventions implemented:     Familiarize pt with environment  Call light within reach and demonstrated use  Personal possessions within reach of pt  Stretcher in low position with wheels locked  Keep floor surfaces clean and dry  Keep care area uncluttered  Hourly assessment for pain, needs, position change, and call light access
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
## 2018-07-18 ENCOUNTER — APPOINTMENT (OUTPATIENT)
Dept: PHYSICAL THERAPY | Facility: REHABILITATION | Age: 48
End: 2018-07-18
Attending: FAMILY MEDICINE
Payer: COMMERCIAL

## 2018-07-18 PROCEDURE — 700102 HCHG RX REV CODE 250 W/ 637 OVERRIDE(OP): Performed by: EMERGENCY MEDICINE

## 2018-07-18 PROCEDURE — A9270 NON-COVERED ITEM OR SERVICE: HCPCS | Performed by: STUDENT IN AN ORGANIZED HEALTH CARE EDUCATION/TRAINING PROGRAM

## 2018-07-18 PROCEDURE — A9270 NON-COVERED ITEM OR SERVICE: HCPCS | Performed by: EMERGENCY MEDICINE

## 2018-07-18 PROCEDURE — 700102 HCHG RX REV CODE 250 W/ 637 OVERRIDE(OP): Performed by: STUDENT IN AN ORGANIZED HEALTH CARE EDUCATION/TRAINING PROGRAM

## 2018-07-18 RX ORDER — IBUPROFEN 200 MG
400 TABLET ORAL ONCE
Status: COMPLETED | OUTPATIENT
Start: 2018-07-18 | End: 2018-07-18

## 2018-07-18 RX ORDER — ARIPIPRAZOLE 10 MG/1
5 TABLET ORAL DAILY
Status: DISCONTINUED | OUTPATIENT
Start: 2018-07-18 | End: 2018-07-19 | Stop reason: HOSPADM

## 2018-07-18 RX ORDER — PROMETHAZINE HYDROCHLORIDE 25 MG/1
25 TABLET ORAL ONCE
Status: COMPLETED | OUTPATIENT
Start: 2018-07-18 | End: 2018-07-18

## 2018-07-18 RX ORDER — CITALOPRAM 40 MG/1
40 TABLET ORAL DAILY
Status: DISCONTINUED | OUTPATIENT
Start: 2018-07-18 | End: 2018-07-19 | Stop reason: HOSPADM

## 2018-07-18 RX ADMIN — IBUPROFEN 400 MG: 200 TABLET, FILM COATED ORAL at 03:35

## 2018-07-18 RX ADMIN — LIDOCAINE HYDROCHLORIDE 30 ML: 20 SOLUTION OROPHARYNGEAL at 09:43

## 2018-07-18 RX ADMIN — AMOXICILLIN 500 MG: 500 CAPSULE ORAL at 17:29

## 2018-07-18 RX ADMIN — PROMETHAZINE HYDROCHLORIDE 25 MG: 25 TABLET ORAL at 09:43

## 2018-07-18 RX ADMIN — CITALOPRAM HYDROBROMIDE 40 MG: 40 TABLET ORAL at 09:44

## 2018-07-18 RX ADMIN — CIPROFLOXACIN AND DEXAMETHASONE 4 DROP: 3; 1 SUSPENSION/ DROPS AURICULAR (OTIC) at 17:30

## 2018-07-18 RX ADMIN — AMOXICILLIN 500 MG: 500 CAPSULE ORAL at 05:51

## 2018-07-18 RX ADMIN — ARIPIPRAZOLE 5 MG: 10 TABLET ORAL at 13:15

## 2018-07-18 RX ADMIN — CIPROFLOXACIN AND DEXAMETHASONE 4 DROP: 3; 1 SUSPENSION/ DROPS AURICULAR (OTIC) at 05:51

## 2018-07-18 ASSESSMENT — PAIN SCALES - GENERAL: PAINLEVEL_OUTOF10: 7

## 2018-07-18 NOTE — ED NOTES
Received report from Laurent POWERS. Pt resting in room, even and unlabored respirations noted. Sitter at bedside. Room stripped.

## 2018-07-18 NOTE — ED NOTES
Report received from PRIYA Montoya. Pt sleeping in hosp bed. Resp even and unlabored. NAD. In view of sitter. Ongoing monitoring.

## 2018-07-18 NOTE — ED NOTES
Pt resting in room. No further nausea noted at this time. Even and unlabored respirations noted. Sitter at bedside.

## 2018-07-18 NOTE — ED PROVIDER NOTES
"ED PROVIDER NOTE    Scribed for Waldemar Polk M.D. by Mary Chavarria. 7/18/2018, 11:40 AM.    This is an addendum to the note on Nneka Ruiz. For further details and full chart entry, see the previously signed ED Provider Note written by Dr. Huitron (ERP).      11:41 AM Patient reevaluated at bedside. The patient is resting comfortably and has no complaints at this time. She just ate breakfast. Ordered phenergan 25 mg and GI cocktail 30 mL. Patient's last set of vitals indicated /74   Pulse 68   Temp 36.5 °C (97.7 °F)   Resp 16   Ht 1.702 m (5' 7\")   Wt (!) 146.1 kg (322 lb 1.5 oz)   SpO2 98%   BMI 50.45 kg/m²     I, Mary Chavarria (Scribe), am scribing for, and in the presence of, Waldemar Polk M.D..  Electronically signed by: Mary Chavarria (Scribe), 7/18/2018  IWaldemar M.D. personally performed the services described in this documentation, as scribed by Mary Chavarria in my presence, and it is both accurate and complete.    The note accurately reflects work and decisions made by me.  Waldemar Polk  7/18/2018  1:23 PM        "

## 2018-07-18 NOTE — DISCHARGE PLANNING
Medical Social Work    Referral: Legal Hold    Intervention: Legal Hold Paperwork given to SW by [employer] RN: Em    Legal Hold Initiated: Date: 07/17/2018  Time: 1434    Legal Hold faxed: Date: 07/18/2018  Time: 0008    Patient’s Insurance Listed on Face Sheet: Access to Healthcare    Referrals sent to: [facility], [facility], [facility] and [facility]    Plan: Patient will transfer to mental health facility once acceptance is obtained.

## 2018-07-18 NOTE — ED NOTES
Pt throwing up in room. Provided pt with emesis bag. ERP aware. Received orders for nausea medication and for pt's headache. Medicated pt per mar.

## 2018-07-18 NOTE — ED NOTES
Med rec complete per Pt at bedside   Allergies reviewed  Pt completed a 7 day course of Doxycyline on 6/25

## 2018-07-19 VITALS
TEMPERATURE: 97.2 F | BODY MASS INDEX: 45.99 KG/M2 | DIASTOLIC BLOOD PRESSURE: 77 MMHG | WEIGHT: 293 LBS | SYSTOLIC BLOOD PRESSURE: 128 MMHG | RESPIRATION RATE: 16 BRPM | HEIGHT: 67 IN | HEART RATE: 74 BPM | OXYGEN SATURATION: 96 %

## 2018-07-19 PROCEDURE — 700102 HCHG RX REV CODE 250 W/ 637 OVERRIDE(OP): Performed by: STUDENT IN AN ORGANIZED HEALTH CARE EDUCATION/TRAINING PROGRAM

## 2018-07-19 PROCEDURE — A9270 NON-COVERED ITEM OR SERVICE: HCPCS | Performed by: EMERGENCY MEDICINE

## 2018-07-19 PROCEDURE — 99284 EMERGENCY DEPT VISIT MOD MDM: CPT | Performed by: PSYCHIATRY & NEUROLOGY

## 2018-07-19 PROCEDURE — 700102 HCHG RX REV CODE 250 W/ 637 OVERRIDE(OP): Performed by: EMERGENCY MEDICINE

## 2018-07-19 PROCEDURE — A9270 NON-COVERED ITEM OR SERVICE: HCPCS | Performed by: STUDENT IN AN ORGANIZED HEALTH CARE EDUCATION/TRAINING PROGRAM

## 2018-07-19 RX ORDER — ARIPIPRAZOLE 5 MG/1
5 TABLET ORAL DAILY
Qty: 30 TAB | Refills: 0 | Status: SHIPPED | OUTPATIENT
Start: 2018-07-20 | End: 2019-03-01 | Stop reason: CLARIF

## 2018-07-19 RX ADMIN — ARIPIPRAZOLE 5 MG: 10 TABLET ORAL at 07:22

## 2018-07-19 RX ADMIN — CIPROFLOXACIN AND DEXAMETHASONE 4 DROP: 3; 1 SUSPENSION/ DROPS AURICULAR (OTIC) at 07:22

## 2018-07-19 RX ADMIN — CITALOPRAM HYDROBROMIDE 40 MG: 40 TABLET ORAL at 07:22

## 2018-07-19 RX ADMIN — LIDOCAINE HYDROCHLORIDE 30 ML: 20 SOLUTION OROPHARYNGEAL at 07:40

## 2018-07-19 RX ADMIN — AMOXICILLIN 500 MG: 500 CAPSULE ORAL at 07:22

## 2018-07-19 ASSESSMENT — PAIN SCALES - GENERAL: PAINLEVEL_OUTOF10: 0

## 2018-07-19 NOTE — PSYCHIATRY
"PSYCHIATRIC FOLLOW UP:    Reason for Admission: suicidal ideation  Psychiatric Supervising Attending: Lillian Farias MD        Subjective: The pt was seen by this provider and then by the entire psychiatric team. The pt reports awake in bed with the lights on in the room. Responds when addressed by saying, \"Good, how are you?\" Reports that she is feeling better today. Shared some of her hx with this provider. Reports that she feels good about leaving today. Denies SI or HI. Denies AH, VH, and other psychotic symptoms such as paranoia. Agrees that she needs to continue therapy in the outpatient setting. Discussed her current medications as well as discontinuing the legal hold. The pt expressed understanding and agreed to take her medications. She denied additional questions or concerns.       Psychiatric Examination (MSE) :    Vitals: /81   Pulse 65   Temp 36.3 °C (97.3 °F) (Temporal)   Resp 16   Ht 1.702 m (5' 7\")   Wt (!) 146.1 kg (322 lb 1.5 oz)   SpO2 95%   BMI 50.45 kg/m²   Musculoskeletal: no psychomotor agitation or retardation; no tremors  Appearance: 46 yo overweight female,  with visible tattoos on arms, WDWN, appears stated age, good hygiene and grooming, wearing hospital attire  Behavior: calm, cooperative, good eye contact  Thought Process: linear, organized, goal-directed  Abnormal Thoughts/Psychosis: no evidence of AH, VH, paranoia, and/or delusions  Associations: no loose associations  Speech: spontaneous, regular rate, rhythm, tone, and volume; no stuttering or slurring of words  Language: fluent in English  Mood/Affect:\"good\"; euthymic, affect is congruent to stated mood, inappropriate to content  SI/HI: Denies SI and HI  Attention: intact  Memory: no gross impairment in immediate, recent, or remote memory  Orientation: A&Ox4  Fund of Knowledge: adequate  Insight and Judgment: good/good    Assessment:  1. Major Depressive Disorder, mild, active  2. Unspecified Anxiety " Disorder  3. R/o Trauma Related Disorder  4. R/o Borderline Personality Disorder    46 yo female who presents to the ED with SI and depression. She is no longer suicidal and has improved in mood and affect since admission. Discontinue legal hold. Will keep previously made med changes and recommend further follow up outpatient.    Plan:   Ok for discharge today  Continue Celexa 40 mg po daily for depression  Continue Abilify 5 mg po daily for adjunct depression and clear thinking  Sent electronic scripts for Abilify to pharmacy in [place] - pt reports having enough Celexa at home to cover increased dose - no script sent  Legal status: discontinued  Anticipate F/U within 48 hours.NO   Labs/tests ordered:none  Records reviewed:YES  Eligible for transfer to [place] [id number] (ED only): NO   Signing off   Thank you for the consult.

## 2018-07-19 NOTE — ED NOTES
Pt rounded on, asleep in bed, respirations even and unlabored, repositioning self as needed, sitter in view.

## 2018-07-19 NOTE — ED NOTES
Patient to be discharged. RX sent to pharmacy for patient. Alert, oriented. Patient called daughter to obtain ride home.

## 2018-07-19 NOTE — ED NOTES
Patient provided discharge instructions, received 1 rx. Instructed to follow up with PCP. Verbalized understanding, no questions at this time. Safe for discharge, ambulates out of ED. Filled out safety contract.

## 2018-07-19 NOTE — ED NOTES
Pt medicated with evening meds per orders- see MAR for details. In view of sitter. Ongoing monitoring.

## 2018-07-19 NOTE — DISCHARGE PLANNING
Alert team note:  Patient's legal hold was discontinued by Dr Farias.  ERP has discharged.  Patient is no longer feeling suicidal.  CSP completed and follow up contact information for [facility] has been provided.

## 2018-07-19 NOTE — ED NOTES
Pt sleeping in hosp bed. Resp even and unlabored. NAD. Pt in view of sitter. Ongoing monitoring.

## 2018-07-19 NOTE — ED NOTES
Assumed care of patient at this time. Patient resting in room. Patient calm, cooperative. No needs expressed. Sitter in view of patient.

## 2018-07-19 NOTE — ED PROVIDER NOTES
"ED PROVIDER NOTE    Scribed for Gerald Marks M.D. by Mary Chavarria. 7/19/2018, 8:22 AM.    This is an addendum to the note on Nneka Ruiz. For further details and full chart entry, see the previously signed ED Provider Note written by Dr. Huitron ([facility]).      7:32 AM Patient was treated with a GI cocktail 30 mL.     8:22 AM Patient reevaluated at bedside.  She is resting comfortably and has no complaints at this time. Patient's last set of vitals were /81   Pulse 65   Temp 36.3 °C (97.3 °F) (Temporal)   Resp 16   Ht 1.702 m (5' 7\")   Wt (!) 146.1 kg (322 lb 1.5 oz)   SpO2 95%   BMI 50.45 kg/m²     12:14 PM Patient was evaluated by the alert team and psychiatry who state the patient is stable for discharge home. Patient received referral to outpatient resources and understands to return with any return of suicidal ideation.     Mary RIOS (Mayaibe), am scribing for, and in the presence of, Gerald Marks M.D..  Electronically signed by: Mary Chavarria (Mayaibe), 7/19/2018  Gerald RIOS M.D. personally performed the services described in this documentation, as scribed by Mary Chavarria in my presence, and it is both accurate and complete.    The note accurately reflects work and decisions made by me.  Gerald Marks  7/19/2018  2:11 PM        "

## 2018-07-19 NOTE — ED NOTES
Patient resting in room. Patient calm, cooperative. No needs expressed. Sitter direct observation.

## 2018-07-19 NOTE — ED NOTES
Close obs continued, sitter posted outside room with direct view of patient.  Pt is reting comfortably on gurney.

## 2018-07-19 NOTE — DISCHARGE PLANNING
[facility]  Crisis/Safety Plan    Name:  Nneka Ruiz  MRN:  [id number]  Date:  2018    Warning signs that a crisis may be developing for me or I may be at risk:  1) Too much on my plate  2) Feeling overwehlmed  3)    Coping strategies I can use on my own (relaxation, physical activity, etc):  1) Sleep and breathe  2) Go to the gym  3) go to the lake[place]swimming    Ways I can make my environment safe:  1) No stressors  2)  3)    Things I want to tell myself when I feel a crisis developin) You are okay  2) You can take something off your plate  3) You are worthy    People I can contact for support or distraction (and their phone numbers):  1) Natasha Palomino [phone]  2) Charu Joseph [phone]  3)Tanner Lopez [phone]    If I’m not able to reach my support people, or the above strategies don’t help, I can contact the following professionals, agencies, or hotlines:  1) Crisis Call Center ():  [phone] -OR- [phone]  2) Crisis Text Line ():  Text START to 222030  3) [facility] walk-in M- 8-5  4)     Irma Trevizo R.N.

## 2018-07-19 NOTE — DISCHARGE INSTRUCTIONS
Depression, Adult  Depression is feeling sad, low, down in the dumps, blue, gloomy, or empty. In general, there are two kinds of depression:  · Normal sadness or grief. This can happen after something upsetting. It often goes away on its own within 2 weeks. After losing a loved one (bereavement), normal sadness and grief may last longer than two weeks. It usually gets better with time.  · Clinical depression. This kind lasts longer than normal sadness or grief. It keeps you from doing the things you normally do in life. It is often hard to function at home, work, or at school. It may affect your relationships with others. Treatment is often needed.  GET HELP RIGHT AWAY IF:  · You have thoughts about hurting yourself or others.  · You lose touch with reality (psychotic symptoms). You may:  ¨ See or hear things that are not real.  ¨ Have untrue beliefs about your life or people around you.  · Your medicine is giving you problems.  MAKE SURE YOU:  · Understand these instructions.  · Will watch your condition.  · Will get help right away if you are not doing well or get worse.     This information is not intended to replace advice given to you by your health care provider. Make sure you discuss any questions you have with your health care provider.     Document Released: 01/20/2012 Document Revised: 01/08/2016 Document Reviewed: 04/18/2013  [employer] Interactive Patient Education ©2016 [employer]

## 2018-07-19 NOTE — ED NOTES
Patient request to see MD as left ear is bothering her since ear drops. MD informed at this time.

## 2018-07-19 NOTE — ED NOTES
RN called patients daughter with her permission to update daughter. RN will call Charu back when plan of care is updated by psych team. # [phone]

## 2018-07-24 ENCOUNTER — APPOINTMENT (OUTPATIENT)
Dept: PHYSICAL THERAPY | Facility: REHABILITATION | Age: 48
End: 2018-07-24
Payer: COMMERCIAL

## 2018-07-27 ENCOUNTER — APPOINTMENT (OUTPATIENT)
Dept: PHYSICAL THERAPY | Facility: REHABILITATION | Age: 48
End: 2018-07-27
Payer: COMMERCIAL

## 2018-07-31 ENCOUNTER — APPOINTMENT (OUTPATIENT)
Dept: PHYSICAL THERAPY | Facility: REHABILITATION | Age: 48
End: 2018-07-31
Payer: COMMERCIAL

## 2018-08-07 ENCOUNTER — APPOINTMENT (OUTPATIENT)
Dept: PHYSICAL THERAPY | Facility: REHABILITATION | Age: 48
End: 2018-08-07
Payer: COMMERCIAL

## 2018-08-14 ENCOUNTER — APPOINTMENT (OUTPATIENT)
Dept: PHYSICAL THERAPY | Facility: REHABILITATION | Age: 48
End: 2018-08-14
Payer: COMMERCIAL

## 2018-08-17 ENCOUNTER — APPOINTMENT (OUTPATIENT)
Dept: PHYSICAL THERAPY | Facility: REHABILITATION | Age: 48
End: 2018-08-17
Payer: COMMERCIAL

## 2018-09-10 ENCOUNTER — OFFICE VISIT (OUTPATIENT)
Dept: MEDICAL GROUP | Facility: CLINIC | Age: 48
End: 2018-09-10
Payer: COMMERCIAL

## 2018-09-10 ENCOUNTER — APPOINTMENT (OUTPATIENT)
Dept: RADIOLOGY | Facility: IMAGING CENTER | Age: 48
End: 2018-09-10
Attending: FAMILY MEDICINE
Payer: COMMERCIAL

## 2018-09-10 VITALS
RESPIRATION RATE: 18 BRPM | SYSTOLIC BLOOD PRESSURE: 128 MMHG | TEMPERATURE: 98.1 F | OXYGEN SATURATION: 95 % | WEIGHT: 293 LBS | HEIGHT: 67 IN | BODY MASS INDEX: 45.99 KG/M2 | HEART RATE: 95 BPM | DIASTOLIC BLOOD PRESSURE: 86 MMHG

## 2018-09-10 DIAGNOSIS — M25.572 ACUTE LEFT ANKLE PAIN: ICD-10-CM

## 2018-09-10 PROCEDURE — 99214 OFFICE O/P EST MOD 30 MIN: CPT | Performed by: FAMILY MEDICINE

## 2018-09-10 PROCEDURE — 73610 X-RAY EXAM OF ANKLE: CPT | Mod: TC,LT | Performed by: FAMILY MEDICINE

## 2018-09-10 ASSESSMENT — ENCOUNTER SYMPTOMS
VOMITING: 0
DIZZINESS: 0
HEADACHES: 0
FEVER: 0
CHILLS: 0
SHORTNESS OF BREATH: 0
NAUSEA: 0

## 2018-09-10 NOTE — PROGRESS NOTES
Subjective:      Nneka Ruiz is a 47 y.o. female who presents with Ankle Injury (EP/ L ankle injury )      NEW injury resulting in NEW onset LEFT ankle pain    HPI   LEFT ankle pain  Date of injury Friday, September 7, 2018  Mechanism of injury, inversion injury coming down a step falling onto her left side, her friend was walking behind her and stumbled as well resulting in direct trauma to LEFT ankle once again because her friend stepped on her ankle inadvertently  POSITIVE snap at the time of injury  Sudden sharp pain, at the LEFT lateral malleolar region as well as the medial malleoli region of the LEFT ankle  No radiation  Achy  No radiation  Improved in cam walker boot with rest  Worse with walking without cam walker boot  POSITIVE swelling which has come down some  POSITIVE night symptoms  Recently treated for LEFT fibular fractureof the LEFT ankle    Review of Systems   Constitutional: Negative for chills and fever.   Respiratory: Negative for shortness of breath.    Cardiovascular: Negative for chest pain.   Gastrointestinal: Negative for nausea and vomiting.   Neurological: Negative for dizziness and headaches.     PMH:  has a past medical history of Anemia; Anxiety; Cold; Dental disorder; Hypertension; Pain (2014); and Psychiatric problem. She also has no past medical history of CAD (coronary artery disease); COPD; Liver disease; or Sickle cell disease (HCC).  MEDS:   Current Outpatient Prescriptions:   •  ARIPiprazole (ABILIFY) 5 MG tablet, Take 1 Tab by mouth every day., Disp: 30 Tab, Rfl: 0  •  fluticasone (FLOVENT HFA) 220 MCG/ACT Aerosol, Inhale 1 Puff by mouth 2 times a day., Disp: 1 Inhaler, Rfl: 0  •  valacyclovir (VALTREX) 1 GM Tab, Take 1 Tab by mouth 2 times a day., Disp: 10 Tab, Rfl: 0  •  multivitamin (THERAGRAN) Tab, Take 1 Tab by mouth every day., Disp: , Rfl:   •  omeprazole (PRILOSEC) 40 MG delayed-release capsule, Take 1 Cap by mouth every day., Disp: 30 Cap, Rfl: 3  •   "lisinopril-hydrochlorothiazide (PRINZIDE, ZESTORETIC) 20-12.5 MG per tablet, Take 1 Tab by mouth every morning., Disp: , Rfl:   •  citalopram (CELEXA) 20 MG Tab, Take 20 mg by mouth every morning., Disp: , Rfl:   ALLERGIES:   Allergies   Allergen Reactions   • Aspirin Vomiting   • Other Misc      Waterproof band aids took off skin and caused infection      SURGHX:   Past Surgical History:   Procedure Laterality Date   • GASTROSCOPY  5/17/2017    Procedure: GASTROSCOPY W/DILATATION, 30-MM ACHALASIA;  Surgeon: Keegan Hernandez M.D.;  Location: SURGERY [facility];  Service:    • GASTRIC SLEEVE LAPAROSCOPY N/A 2/3/2016    Procedure: GASTRIC SLEEVE LAPAROSCOPY AND LIVER BIOPSY;  Surgeon: Keegan Hernandez M.D.;  Location: SURGERY [facility];  Service:    • KNEE ARTHROSCOPY  12/10/2014    Performed by Mikel Spencer M.D. at [facility]   • MEDIAL MENISCECTOMY  12/10/2014    Performed by Mikel Spencer M.D. at [facility]   • MENISCECTOMY  12/10/2014    Performed by Mikel Spencer M.D. at [facility]   • LATERAL RELEASE  12/10/2014    Performed by Mikel Spencer M.D. at [facility]   • TYMPANOPLASTY  11/6/2012    Performed by Jonn Kaufman M.D. at SURGERY [facility]   • TONSILLECTOMY     • TUBAL LIGATION       SOCHX:  reports that she quit smoking about 9 months ago. Her smoking use included Cigarettes. She has a 12.00 pack-year smoking history. She has never used smokeless tobacco. She reports that she drinks alcohol. She reports that she does not use drugs.  FH: Family history was reviewed, no pertinent findings to report       Objective:     /86   Pulse 95   Temp 36.7 °C (98.1 °F)   Resp 18   Ht 1.702 m (5' 7\")   Wt (!) 146.1 kg (322 lb)   SpO2 95%   BMI 50.43 kg/m²      Physical Exam       RIGHT ANKLE:  There is NO swelling noted at the ankle  Range of motion intact with dorsiflexion and plantarflexion, inversion and " eversion  There is NO tenderness of the ATFL, CF or PT of ligament  There is NO tenderness of the lateral malleolus or medial malleolus  Anterior drawer testing is NEGATIVE  Talar tilt testing is NEGATIVE  The foot and ankle is otherwise neurovascularly intact    RIGHT FOOT:  There is NO swelling noted at the foot  There is NO tenderness at the base of the fifth metatarsal, cuboid, or tarsal navicular  There is NO pain with metatarsal squeeze test    LEFT ANKLE:  There is POSITIVE swelling noted at the ankle  Range of motion LIMITED with dorsiflexion and plantarflexion, inversion and eversion  There is POSITIVE tenderness of the ATFL and CF without tenderness of the PTf ligament  There is POSITIVE tenderness of the lateral malleolus without tenderness of the medial malleolus  Anterior drawer testing is POSITIVE  Talar tilt testing is NEGATIVE  The foot and ankle is otherwise neurovascularly intact    LEFT FOOT:  There is NO swelling noted at the foot  There is NO tenderness at the base of the fifth metatarsal, cuboid, or tarsal navicular  There is NO pain with metatarsal squeeze test  NO tenderness at the insertion of the plantar fascia on the LEFT foot    NEUTRAL stance  Able to ambulate with ANTALGIC gait in Cam Walker boot     Assessment/Plan:     1. Acute left ankle pain  DX-ANKLE 3+ VIEWS LEFT    NEW injury     Suspect simple ankle sprain  Stable fracture, no mortise widening  D/c Cam Walker boot    Return in about 2 weeks (around 9/24/2018).  To see how she is doing with her LEFT ankle after recent reinjury    9/10/2018 4:09 PM    HISTORY/REASON FOR EXAM:  Pain/Deformity Following Trauma.      TECHNIQUE/EXAM DESCRIPTION AND NUMBER OF VIEWS:  3 views of the  LEFT ankle.    COMPARISON: 6/25/2018    FINDINGS:    MINERALIZATION: Mineralization is unremarkable for age.    INJURY: Lucent fracture line is redemonstrated in the lateral malleolus with some peripheral callus formation. No new displacement is  present.    JOINTS: No erosive arthropathy is evident.  Ankle mortise is symmetric.    There are Achilles and plantar enthesophytes   Impression       Healing fracture of the lateral malleolus     Interpreted in the office today with the patient, NO obvious new injury with her recent reinjury, healing fibular fracture as expected    Thank you Ronny Monroy M.D. for allowing me to participate in caring for your patient.

## 2019-01-19 ENCOUNTER — OFFICE VISIT (OUTPATIENT)
Dept: URGENT CARE | Facility: PHYSICIAN GROUP | Age: 49
End: 2019-01-19
Payer: COMMERCIAL

## 2019-01-19 VITALS
HEIGHT: 67 IN | RESPIRATION RATE: 14 BRPM | TEMPERATURE: 98.1 F | WEIGHT: 293 LBS | DIASTOLIC BLOOD PRESSURE: 102 MMHG | OXYGEN SATURATION: 92 % | SYSTOLIC BLOOD PRESSURE: 150 MMHG | BODY MASS INDEX: 45.99 KG/M2 | HEART RATE: 84 BPM

## 2019-01-19 DIAGNOSIS — R51.9 SEVERE HEADACHE: ICD-10-CM

## 2019-01-19 PROCEDURE — 99214 OFFICE O/P EST MOD 30 MIN: CPT | Performed by: EMERGENCY MEDICINE

## 2019-01-19 ASSESSMENT — ENCOUNTER SYMPTOMS
FEVER: 0
CHILLS: 0

## 2019-01-19 NOTE — PROGRESS NOTES
Subjective:      Nneka Ruiz is a 48 y.o. female who presents with Sinus Problem    HPI  Pt with severe migraine HA 10/10 yesterday , 8/10 today the worst HA of her life with blurred vision yesterday   Scheduled for repeat uterine BX  1/29 to check for recurrance, is on medroxyprogesteron for bleeding.     Pts pain localizes to the backmof her head at the vse of her neck;  PMH:  has a past medical history of Anemia; Anxiety; Cold; Dental disorder; Hypertension; Pain (2014); and Psychiatric problem. She also has no past medical history of CAD (coronary artery disease); COPD; Liver disease; or Sickle cell disease (HCC).  MEDS:   Current Outpatient Prescriptions:   •  ARIPiprazole (ABILIFY) 5 MG tablet, Take 1 Tab by mouth every day., Disp: 30 Tab, Rfl: 0  •  fluticasone (FLOVENT HFA) 220 MCG/ACT Aerosol, Inhale 1 Puff by mouth 2 times a day., Disp: 1 Inhaler, Rfl: 0  •  valacyclovir (VALTREX) 1 GM Tab, Take 1 Tab by mouth 2 times a day., Disp: 10 Tab, Rfl: 0  •  multivitamin (THERAGRAN) Tab, Take 1 Tab by mouth every day., Disp: , Rfl:   •  omeprazole (PRILOSEC) 40 MG delayed-release capsule, Take 1 Cap by mouth every day., Disp: 30 Cap, Rfl: 3  •  lisinopril-hydrochlorothiazide (PRINZIDE, ZESTORETIC) 20-12.5 MG per tablet, Take 1 Tab by mouth every morning., Disp: , Rfl:   •  citalopram (CELEXA) 20 MG Tab, Take 20 mg by mouth every morning., Disp: , Rfl:   ALLERGIES:   Allergies   Allergen Reactions   • Aspirin Vomiting   • Other Misc      Waterproof band aids took off skin and caused infection      SURGHX:   Past Surgical History:   Procedure Laterality Date   • GASTROSCOPY  5/17/2017    Procedure: GASTROSCOPY W/DILATATION, 30-MM ACHALASIA;  Surgeon: Keegan Hernandez M.D.;  Location: SURGERY [facility];  Service:    • GASTRIC SLEEVE LAPAROSCOPY N/A 2/3/2016    Procedure: GASTRIC SLEEVE LAPAROSCOPY AND LIVER BIOPSY;  Surgeon: Keegan Hernandez M.D.;  Location: SURGERY [facility];  Service:    • KNEE  "ARTHROSCOPY  12/10/2014    Performed by Mikel Spencer M.D. at SURGERY [facility]   • MEDIAL MENISCECTOMY  12/10/2014    Performed by Mikel Spencer M.D. at SURGERY [facility]   • MENISCECTOMY  12/10/2014    Performed by Mikel Spencer M.D. at SURGERY [facility]   • LATERAL RELEASE  12/10/2014    Performed by Mikel Spencer M.D. at SURGERY [facility]   • TYMPANOPLASTY  11/6/2012    Performed by Jonn Kaufman M.D. at SURGERY [facility]   • TONSILLECTOMY     • TUBAL LIGATION       SOCHX:  reports that she quit smoking about 13 months ago. Her smoking use included Cigarettes. She has a 12.00 pack-year smoking history. She has never used smokeless tobacco. She reports that she drinks alcohol. She reports that she does not use drugs.  FH: Reviewed with patient, not pertinent to this visit.   Review of Systems   Constitutional: Negative for chills and fever.   HENT: Positive for congestion and sinus pain.    Eyes: Negative for discharge and redness.   Respiratory: Negative for cough.    Cardiovascular: Negative for chest pain.   Gastrointestinal: Negative for abdominal pain, nausea and vomiting.   Musculoskeletal: Negative for neck pain.   Skin: Negative for rash.   Neurological: Positive for headaches.   Psychiatric/Behavioral: The patient is nervous/anxious.           Objective:     /102 (BP Location: Right arm, Patient Position: Sitting, BP Cuff Size: Large adult)   Pulse 84   Temp 36.7 °C (98.1 °F) (Temporal)   Resp 14   Ht 1.702 m (5' 7\")   Wt (!) 151 kg (333 lb)   SpO2 92%   BMI 52.16 kg/m²      Physical Exam   Constitutional: She appears well-developed and well-nourished. She appears distressed.   HENT:   Head: Normocephalic and atraumatic.   Right Ear: External ear normal.   Left Ear: External ear normal.   Eyes: Pupils are equal, round, and reactive to light. EOM are normal.   Tearful    Neck: Normal range of motion. Neck supple. "   Cardiovascular: Normal rate.    Pulmonary/Chest: Effort normal. No stridor.   Musculoskeletal: Normal range of motion.   Neurological: She is alert. No cranial nerve deficit or sensory deficit. She exhibits normal muscle tone. Coordination normal.   Skin: Skin is warm and dry.   Nursing note and vitals reviewed.              Assessment/Plan:     DX Severe HA.          Cancer of the Uterus 2009                / 102     I rec that pt be evaluated for her HA ASAP That I felt she needed to be seen in an ED where a CT could be performed if appropriate, She stated she was going to go in mow for evaluation, Pt will have other drive her to hospital I called  and gave report.

## 2019-01-20 ASSESSMENT — ENCOUNTER SYMPTOMS
NERVOUS/ANXIOUS: 1
EYE REDNESS: 0
HEADACHES: 1
NECK PAIN: 0
EYE DISCHARGE: 0
VOMITING: 0
SINUS PAIN: 1
ABDOMINAL PAIN: 0
COUGH: 0
NAUSEA: 0

## 2019-01-26 ENCOUNTER — OFFICE VISIT (OUTPATIENT)
Dept: URGENT CARE | Facility: PHYSICIAN GROUP | Age: 49
End: 2019-01-26
Payer: COMMERCIAL

## 2019-01-26 VITALS
HEART RATE: 84 BPM | OXYGEN SATURATION: 99 % | SYSTOLIC BLOOD PRESSURE: 120 MMHG | TEMPERATURE: 98.8 F | RESPIRATION RATE: 16 BRPM | DIASTOLIC BLOOD PRESSURE: 76 MMHG

## 2019-01-26 DIAGNOSIS — J01.90 ACUTE BACTERIAL SINUSITIS: ICD-10-CM

## 2019-01-26 DIAGNOSIS — B96.89 ACUTE BACTERIAL SINUSITIS: ICD-10-CM

## 2019-01-26 PROCEDURE — 99214 OFFICE O/P EST MOD 30 MIN: CPT | Performed by: FAMILY MEDICINE

## 2019-01-26 RX ORDER — DOXYCYCLINE HYCLATE 100 MG
100 TABLET ORAL 2 TIMES DAILY
Qty: 20 TAB | Refills: 0 | Status: SHIPPED | OUTPATIENT
Start: 2019-01-26 | End: 2019-02-05

## 2019-01-26 NOTE — PROGRESS NOTES
Chief Complaint:    Chief Complaint   Patient presents with   • Sinus Problem     ear (L) pain x1.5 weeks       History of Present Illness:    This is a new problem. Symptoms x 10 days; has nasal symptoms with purulent mucus from nose, discomfort in sinus regions, left ear discomfort, post-nasal drainage, and cough. Overall symptoms are at least moderate severity and not getting better. Using OTC meds for symptoms with some temporary help. Augmentin works/tolerates, but gives her vaginal yeast infection for which Diflucan works/tolerates, but there is potential heart interaction with Diflucan and Citalopram. Doxycycline works/tolerates and does not give her vaginal yeast infection.      Review of Systems:    Constitutional: Negative for fever, chills, and diaphoresis.   Eyes: Negative for change in vision, photophobia, pain, redness, and discharge.  ENT: See HPI.    Respiratory: See HPI.  Cardiovascular: Negative for chest pain, palpitations, orthopnea, claudication, leg swelling, and PND.   Gastrointestinal: Negative for abdominal pain, nausea, vomiting, diarrhea, constipation, blood in stool, and melena.   Genitourinary: Negative for dysuria, urinary urgency, urinary frequency, hematuria, and flank pain.   Musculoskeletal: Negative for myalgias, joint pain, neck pain, and back pain.   Skin: Negative for rash and itching.   Neurological: Negative for dizziness, tingling, tremors, sensory change, speech change, focal weakness, seizures, loss of consciousness, and headaches.   Endo: Negative for polydipsia.   Heme: Does not bruise/bleed easily.   Psychiatric/Behavioral: No new symptoms.       Past Medical History:    Past Medical History:   Diagnosis Date   • Anemia     during pregnancy   • Anxiety    • Cold    • Dental disorder     lower partial   • Hypertension    • Pain 2014    left knee   • Psychiatric problem     anxiety     Past Surgical History:    Past Surgical History:   Procedure Laterality Date   •  GASTROSCOPY  5/17/2017    Procedure: GASTROSCOPY W/DILATATION, 30-MM ACHALASIA;  Surgeon: Keegan Hernandez M.D.;  Location: SURGERY [facility];  Service:    • GASTRIC SLEEVE LAPAROSCOPY N/A 2/3/2016    Procedure: GASTRIC SLEEVE LAPAROSCOPY AND LIVER BIOPSY;  Surgeon: Keegan Hernandez M.D.;  Location: SURGERY [facility];  Service:    • KNEE ARTHROSCOPY  12/10/2014    Performed by Mikel Spencer M.D. at [facility]   • MEDIAL MENISCECTOMY  12/10/2014    Performed by Mikel Spencer M.D. at [facility]   • MENISCECTOMY  12/10/2014    Performed by Mikel Spencer M.D. at [facility]   • LATERAL RELEASE  12/10/2014    Performed by Mikel Spencer M.D. at [facility]   • TYMPANOPLASTY  11/6/2012    Performed by Jonn Kaufman M.D. at SURGERY [facility] DAY [facility]   • TONSILLECTOMY     • TUBAL LIGATION       Social History:    Social History     Social History   • Marital status:      Spouse name: N/A   • Number of children: N/A   • Years of education: N/A     Occupational History   • Not on file.     Social History Main Topics   • Smoking status: Former Smoker     Packs/day: 1.00     Years: 12.00     Types: Cigarettes     Quit date: 12/1/2017   • Smokeless tobacco: Never Used      Comment: 2 yrs  1/2 ppd   • Alcohol use 0.0 oz/week      Comment: 0-2 drinks per month   • Drug use: No      Comment: meth  non since 2001   • Sexual activity: Yes     Partners: Male     Other Topics Concern   • Not on file     Social History Narrative   • No narrative on file     Family History:    History reviewed. No pertinent family history.    Medications:    Current Outpatient Prescriptions on File Prior to Visit   Medication Sig Dispense Refill   • ARIPiprazole (ABILIFY) 5 MG tablet Take 1 Tab by mouth every day. 30 Tab 0   • fluticasone (FLOVENT HFA) 220 MCG/ACT Aerosol Inhale 1 Puff by mouth 2 times a day. 1 Inhaler 0   • valacyclovir (VALTREX) 1 GM Tab  Take 1 Tab by mouth 2 times a day. 10 Tab 0   • multivitamin (THERAGRAN) Tab Take 1 Tab by mouth every day.     • omeprazole (PRILOSEC) 40 MG delayed-release capsule Take 1 Cap by mouth every day. 30 Cap 3   • lisinopril-hydrochlorothiazide (PRINZIDE, ZESTORETIC) 20-12.5 MG per tablet Take 1 Tab by mouth every morning.     • citalopram (CELEXA) 20 MG Tab Take 20 mg by mouth every morning.       No current facility-administered medications on file prior to visit.      Allergies:    Allergies   Allergen Reactions   • Aspirin Vomiting   • Other Misc      Waterproof band aids took off skin and caused infection        Vitals:    Vitals:    01/26/19 1306   BP: 120/76   Pulse: 84   Resp: 16   Temp: 37.1 °C (98.8 °F)   SpO2: 99%       Physical Exam:    Constitutional: Vital signs reviewed. Appears well-developed and well-nourished. Occl cough. No acute distress.   Eyes: Sclera white, conjunctivae clear.   ENT: TTP bilateral maxillary and frontal sinus regions. External ears normal. External auditory canals normal without discharge. TMs translucent and non-bulging with blue PE tube in right TM. Hearing normal. Nasal mucosa erythematous. Lips/teeth are normal. Oral mucosa pink and moist. Posterior pharynx: WNL.  Neck: Neck supple.   Cardiovascular: Regular rate and rhythm. No murmur.  Pulmonary/Chest: Respirations non-labored. Clear to auscultation bilaterally.  Lymph: Cervical nodes without tenderness or enlargement.  Musculoskeletal: Normal gait. Normal range of motion. No muscular atrophy or weakness.  Neurological: Alert and oriented to person, place, and time. Muscle tone normal. Coordination normal.   Skin: No rashes or lesions. Warm, dry, normal turgor.  Psychiatric: Normal mood and affect. Behavior is normal. Judgment and thought content normal.       Assessment / Plan:    1. Acute bacterial sinusitis  - doxycycline (VIBRAMYCIN) 100 MG Tab; Take 1 Tab by mouth 2 times a day for 10 days.  Dispense: 20 Tab; Refill:  0      Discussed with her DDX, management options, and risks, benefits, and alternatives to treatment plan agreed upon.    May use OTC meds for symptoms prn.    Agreeable to medication prescribed.    Discussed expected course of duration, time for improvement, and to seek follow-up in Emergency Room, urgent care, or with PCP if getting worse at any time or not improving within expected time frame.

## 2019-02-05 ENCOUNTER — OFFICE VISIT (OUTPATIENT)
Dept: URGENT CARE | Facility: PHYSICIAN GROUP | Age: 49
End: 2019-02-05
Payer: COMMERCIAL

## 2019-02-05 VITALS
DIASTOLIC BLOOD PRESSURE: 98 MMHG | BODY MASS INDEX: 45.99 KG/M2 | WEIGHT: 293 LBS | OXYGEN SATURATION: 100 % | TEMPERATURE: 98.1 F | HEART RATE: 88 BPM | RESPIRATION RATE: 14 BRPM | HEIGHT: 67 IN | SYSTOLIC BLOOD PRESSURE: 142 MMHG

## 2019-02-05 DIAGNOSIS — R00.2 PALPITATIONS: ICD-10-CM

## 2019-02-05 DIAGNOSIS — R11.0 NAUSEA: ICD-10-CM

## 2019-02-05 LAB
APPEARANCE UR: CLEAR
BILIRUB UR STRIP-MCNC: NORMAL MG/DL
COLOR UR AUTO: YELLOW
EKG 4674: NORMAL
GLUCOSE BLD-MCNC: 97 MG/DL (ref 70–100)
GLUCOSE UR STRIP.AUTO-MCNC: NORMAL MG/DL
KETONES UR STRIP.AUTO-MCNC: NORMAL MG/DL
LEUKOCYTE ESTERASE UR QL STRIP.AUTO: NORMAL
NITRITE UR QL STRIP.AUTO: NORMAL
PH UR STRIP.AUTO: 5.5 [PH] (ref 5–8)
PROT UR QL STRIP: NORMAL MG/DL
RBC UR QL AUTO: NORMAL
SP GR UR STRIP.AUTO: 1.01
UROBILINOGEN UR STRIP-MCNC: 0.2 MG/DL

## 2019-02-05 PROCEDURE — 81002 URINALYSIS NONAUTO W/O SCOPE: CPT | Performed by: PHYSICIAN ASSISTANT

## 2019-02-05 PROCEDURE — 82962 GLUCOSE BLOOD TEST: CPT | Performed by: PHYSICIAN ASSISTANT

## 2019-02-05 PROCEDURE — 99214 OFFICE O/P EST MOD 30 MIN: CPT | Performed by: PHYSICIAN ASSISTANT

## 2019-02-05 RX ORDER — MEDROXYPROGESTERONE ACETATE 10 MG/1
10 TABLET ORAL DAILY
COMMUNITY
End: 2019-03-01 | Stop reason: CLARIF

## 2019-02-05 RX ORDER — ONDANSETRON 4 MG/1
4 TABLET, ORALLY DISINTEGRATING ORAL EVERY 8 HOURS PRN
Qty: 20 TAB | Refills: 0 | Status: SHIPPED | OUTPATIENT
Start: 2019-02-05 | End: 2019-03-01 | Stop reason: CLARIF

## 2019-02-05 RX ORDER — ONDANSETRON 2 MG/ML
4 INJECTION INTRAMUSCULAR; INTRAVENOUS ONCE
Status: COMPLETED | OUTPATIENT
Start: 2019-02-05 | End: 2019-02-05

## 2019-02-05 RX ADMIN — ONDANSETRON 4 MG: 2 INJECTION INTRAMUSCULAR; INTRAVENOUS at 10:36

## 2019-02-05 NOTE — PROGRESS NOTES
Chief Complaint   Patient presents with   • Dizziness   • Nausea   • Palpitations     started this AM/ Pt states everything started when she took the Medroxyprogesterone and Tussin cough       HISTORY OF PRESENT ILLNESS: Patient is a 48 y.o. female who presents today for the following:    Palpitations started around 0850 today  Took 1 new medication around 0730  Medroxyprogesterone is new x 1 week; started by GYN for heavy/prolonged vaginal bleeding; scheduled for uterine polyp removal next week  Randal VALLE, OTC - took today with medroxyprogesterone   Took last dose of doxycycline today - was seen 1/26/19  Denies fever  + nausea without vomiting or localized abdominal pain   Recent blood work normal per patient, no anemia  Followed by gynecology    Patient Active Problem List    Diagnosis Date Noted   • Morbid obesity with BMI of 45.0-49.9, adult (Formerly [facility]) 12/20/2017   • Stenosis of esophagus 05/17/2017   • Morbid obesity (Formerly [facility]) 02/03/2016   • Tear of medial cartilage or meniscus of knee, current 12/10/2014   • Central perforation of tympanic membrane 11/06/2012   • Ruptured ear drum 08/29/2012   • Depression 08/29/2012   • Pedal edema 08/29/2012   • Obesity 08/29/2012       Allergies:Aspirin and Other misc    Current Outpatient Prescriptions Ordered in [facility]   Medication Sig Dispense Refill   • medroxyPROGESTERone (PROVERA) 10 MG Tab Take 10 mg by mouth every day.     • Phenylephrine-DM-GG (TUSSIN CF PO) Take  by mouth.     • ondansetron (ZOFRAN ODT) 4 MG TABLET DISPERSIBLE Take 1 Tab by mouth every 8 hours as needed for Nausea. 20 Tab 0   • doxycycline (VIBRAMYCIN) 100 MG Tab Take 1 Tab by mouth 2 times a day for 10 days. 20 Tab 0   • ARIPiprazole (ABILIFY) 5 MG tablet Take 1 Tab by mouth every day. 30 Tab 0   • fluticasone (FLOVENT HFA) 220 MCG/ACT Aerosol Inhale 1 Puff by mouth 2 times a day. 1 Inhaler 0   • valacyclovir (VALTREX) 1 GM Tab Take 1 Tab by mouth 2 times a day. 10 Tab 0   • multivitamin (THERAGRAN) Tab  Take 1 Tab by mouth every day.     • omeprazole (PRILOSEC) 40 MG delayed-release capsule Take 1 Cap by mouth every day. 30 Cap 3   • lisinopril-hydrochlorothiazide (PRINZIDE, ZESTORETIC) 20-12.5 MG per tablet Take 1 Tab by mouth every morning.     • citalopram (CELEXA) 20 MG Tab Take 20 mg by mouth every morning.       No current [facility]-ordered facility-administered medications on file.        Past Medical History:   Diagnosis Date   • Anemia     during pregnancy   • Anxiety    • Cold    • Dental disorder     lower partial   • Hypertension    • Pain 2014    left knee   • Psychiatric problem     anxiety       Social History   Substance Use Topics   • Smoking status: Former Smoker     Packs/day: 1.00     Years: 12.00     Types: Cigarettes     Quit date: 12/1/2017   • Smokeless tobacco: Never Used      Comment: 2 yrs  1/2 ppd   • Alcohol use 0.0 oz/week      Comment: 0-2 drinks per month       Family Status   Relation Status   • Mo Alive   • Fa Alive   No family history on file.    Review of Systems:   Constitutional ROS: No unexpected change in weight, No weakness, No fatigue  Eye ROS: No recent significant change in vision, No eye pain, redness, discharge  Ear ROS: No drainage, No tinnitus or vertigo, No recent change in hearing  Mouth/Throat ROS: No teeth or gum problems, No bleeding gums, No tongue complaints  Neck ROS: No swollen glands, No significant pain in neck  Pulmonary ROS: No chronic cough, sputum, or hemoptysis, No dyspnea on exertion, No wheezing  Cardiovascular ROS: Positive for palpitations.  Gastrointestinal ROS: Positive for nausea.  Musculoskeletal/Extremities ROS: No peripheral edema, No pain, redness or swelling on the joints  Hematologic/Lymphatic ROS: No chills, No night sweats, No weight loss  Skin/Integumentary ROS: No edema, No evidence of rash, No itching      Exam:  Blood pressure 142/98, pulse 88, temperature 36.7 °C (98.1 °F), temperature source Temporal, resp. rate 14, height 1.702 m (5'  "7\"), weight (!) 149.7 kg (330 lb), SpO2 100 %, not currently breastfeeding.  General: Well developed, well nourished. No distress.  Eye: PERRL/EOMI; conjunctivae clear, lids normal.  ENMT: Lips without lesions, MMM. Oropharynx is clear.  Blue T tubes noted in the right TM.  Left EAC and TM are within normal limits.  Neck: No carotid bruits noted.  Pulmonary: Unlabored respiratory effort. Lungs clear to auscultation, no wheezes, no rhonchi.  Cardiovascular: Regular rate and rhythm without murmur.    Neurologic: Grossly nonfocal. No facial asymmetry noted.  Lymph: No cervical lymphadenopathy noted.  Skin: Warm, dry, good turgor. No rashes in visible areas.   Psych: Normal mood. Alert and oriented x3. Judgment and insight is normal.    Glucose: 97    EKG, per my interpretation: Normal sinus rhythm.  No ST elevation/depression.  Normal axis.    UA: Positive blood, otherwise negative    Zofran 4 mg IM    Assessment/Plan:  After much discussion, patient remembers she did have a little bit of an energy drink just prior to taking the over-the-counter cough suppressant containing dextromethorphan.  Suspect that the medroxyprogesterone causing the nausea combined with the dextromethorphan and energy drink may have contributed to her symptoms.  Encouraged her to drink plenty of water over the next few hours and suspect that her symptoms will resolve.  Discussed importance of following up, however, for any worsening or persistent symptoms.  1. Palpitations  POCT Urinalysis    POCT glucose    EKG   2. Nausea  POCT Urinalysis    POCT glucose    EKG    ondansetron (ZOFRAN ODT) 4 MG TABLET DISPERSIBLE    ondansetron (ZOFRAN) syringe/vial injection 4 mg     "

## 2019-03-01 ENCOUNTER — HOSPITAL ENCOUNTER (OUTPATIENT)
Dept: RADIOLOGY | Facility: MEDICAL CENTER | Age: 49
End: 2019-03-01

## 2019-03-01 ENCOUNTER — APPOINTMENT (OUTPATIENT)
Dept: RADIOLOGY | Facility: MEDICAL CENTER | Age: 49
End: 2019-03-01
Attending: ORTHOPAEDIC SURGERY
Payer: COMMERCIAL

## 2019-03-01 ENCOUNTER — HOSPITAL ENCOUNTER (OUTPATIENT)
Facility: MEDICAL CENTER | Age: 49
End: 2019-03-02
Attending: EMERGENCY MEDICINE | Admitting: HOSPITALIST
Payer: COMMERCIAL

## 2019-03-01 DIAGNOSIS — S42.342A CLOSED DISPLACED SPIRAL FRACTURE OF SHAFT OF LEFT HUMERUS, INITIAL ENCOUNTER: ICD-10-CM

## 2019-03-01 DIAGNOSIS — F10.921 ACUTE ALCOHOLIC INTOXICATION WITH DELIRIUM (HCC): ICD-10-CM

## 2019-03-01 PROBLEM — K21.9 GASTROESOPHAGEAL REFLUX DISEASE WITHOUT ESOPHAGITIS: Status: ACTIVE | Noted: 2019-03-01

## 2019-03-01 PROBLEM — F10.20 ALCOHOLISM (HCC): Status: ACTIVE | Noted: 2019-03-01

## 2019-03-01 PROBLEM — I10 HYPERTENSION: Status: ACTIVE | Noted: 2019-03-01

## 2019-03-01 PROBLEM — Z72.0 TOBACCO USE: Status: ACTIVE | Noted: 2019-03-01

## 2019-03-01 PROBLEM — S42.302A FRACTURE OF LEFT HUMERUS: Status: ACTIVE | Noted: 2019-03-01

## 2019-03-01 LAB
HCG UR QL: NEGATIVE
SP GR UR REFRACTOMETRY: 1.01

## 2019-03-01 PROCEDURE — 500891 HCHG PACK, ORTHO MAJOR: Performed by: ORTHOPAEDIC SURGERY

## 2019-03-01 PROCEDURE — A9270 NON-COVERED ITEM OR SERVICE: HCPCS | Performed by: HOSPITALIST

## 2019-03-01 PROCEDURE — 81025 URINE PREGNANCY TEST: CPT

## 2019-03-01 PROCEDURE — 501838 HCHG SUTURE GENERAL: Performed by: ORTHOPAEDIC SURGERY

## 2019-03-01 PROCEDURE — 96376 TX/PRO/DX INJ SAME DRUG ADON: CPT

## 2019-03-01 PROCEDURE — 99220 PR INITIAL OBSERVATION CARE,LEVL III: CPT | Mod: 25 | Performed by: HOSPITALIST

## 2019-03-01 PROCEDURE — 500881 HCHG PACK, EXTREMITY: Performed by: ORTHOPAEDIC SURGERY

## 2019-03-01 PROCEDURE — G0378 HOSPITAL OBSERVATION PER HR: HCPCS

## 2019-03-01 PROCEDURE — 700111 HCHG RX REV CODE 636 W/ 250 OVERRIDE (IP): Performed by: ORTHOPAEDIC SURGERY

## 2019-03-01 PROCEDURE — 700111 HCHG RX REV CODE 636 W/ 250 OVERRIDE (IP)

## 2019-03-01 PROCEDURE — 96374 THER/PROPH/DIAG INJ IV PUSH: CPT

## 2019-03-01 PROCEDURE — 304561 HCHG STAT O2

## 2019-03-01 PROCEDURE — 700111 HCHG RX REV CODE 636 W/ 250 OVERRIDE (IP): Performed by: ANESTHESIOLOGY

## 2019-03-01 PROCEDURE — 700102 HCHG RX REV CODE 250 W/ 637 OVERRIDE(OP)

## 2019-03-01 PROCEDURE — 700101 HCHG RX REV CODE 250

## 2019-03-01 PROCEDURE — 160041 HCHG SURGERY MINUTES - EA ADDL 1 MIN LEVEL 4: Performed by: ORTHOPAEDIC SURGERY

## 2019-03-01 PROCEDURE — 160035 HCHG PACU - 1ST 60 MINS PHASE I: Performed by: ORTHOPAEDIC SURGERY

## 2019-03-01 PROCEDURE — 501445 HCHG STAPLER, SKIN DISP: Performed by: ORTHOPAEDIC SURGERY

## 2019-03-01 PROCEDURE — 73060 X-RAY EXAM OF HUMERUS: CPT | Mod: LT

## 2019-03-01 PROCEDURE — A9270 NON-COVERED ITEM OR SERVICE: HCPCS

## 2019-03-01 PROCEDURE — A9270 NON-COVERED ITEM OR SERVICE: HCPCS | Performed by: INTERNAL MEDICINE

## 2019-03-01 PROCEDURE — 700102 HCHG RX REV CODE 250 W/ 637 OVERRIDE(OP): Performed by: HOSPITALIST

## 2019-03-01 PROCEDURE — 500562 HCHG FIBERWIRE: Performed by: ORTHOPAEDIC SURGERY

## 2019-03-01 PROCEDURE — 700111 HCHG RX REV CODE 636 W/ 250 OVERRIDE (IP): Performed by: HOSPITALIST

## 2019-03-01 PROCEDURE — 160048 HCHG OR STATISTICAL LEVEL 1-5: Performed by: ORTHOPAEDIC SURGERY

## 2019-03-01 PROCEDURE — 96365 THER/PROPH/DIAG IV INF INIT: CPT

## 2019-03-01 PROCEDURE — 96375 TX/PRO/DX INJ NEW DRUG ADDON: CPT

## 2019-03-01 PROCEDURE — 160036 HCHG PACU - EA ADDL 30 MINS PHASE I: Performed by: ORTHOPAEDIC SURGERY

## 2019-03-01 PROCEDURE — C1713 ANCHOR/SCREW BN/BN,TIS/BN: HCPCS | Performed by: ORTHOPAEDIC SURGERY

## 2019-03-01 PROCEDURE — 99406 BEHAV CHNG SMOKING 3-10 MIN: CPT | Performed by: HOSPITALIST

## 2019-03-01 PROCEDURE — 500380 HCHG DRAIN, PENROSE 1/4X12: Performed by: ORTHOPAEDIC SURGERY

## 2019-03-01 PROCEDURE — 700102 HCHG RX REV CODE 250 W/ 637 OVERRIDE(OP): Performed by: INTERNAL MEDICINE

## 2019-03-01 PROCEDURE — 160002 HCHG RECOVERY MINUTES (STAT): Performed by: ORTHOPAEDIC SURGERY

## 2019-03-01 PROCEDURE — 160029 HCHG SURGERY MINUTES - 1ST 30 MINS LEVEL 4: Performed by: ORTHOPAEDIC SURGERY

## 2019-03-01 PROCEDURE — 160009 HCHG ANES TIME/MIN: Performed by: ORTHOPAEDIC SURGERY

## 2019-03-01 PROCEDURE — 99285 EMERGENCY DEPT VISIT HI MDM: CPT

## 2019-03-01 PROCEDURE — 700101 HCHG RX REV CODE 250: Performed by: HOSPITALIST

## 2019-03-01 PROCEDURE — 500424 HCHG DRESSING, AIRSTRIP: Performed by: ORTHOPAEDIC SURGERY

## 2019-03-01 DEVICE — IMPLANTABLE DEVICE: Type: IMPLANTABLE DEVICE | Site: HUMERUS | Status: FUNCTIONAL

## 2019-03-01 DEVICE — SCREW CORT 3.5X24MM ST HEX (4TX6+1TX4+1TX3=31)(SDS=22): Type: IMPLANTABLE DEVICE | Site: HUMERUS | Status: FUNCTIONAL

## 2019-03-01 DEVICE — SCREW LOCK 3.5X34MM ST T15 - (4SFLX6+LPPELVX4=28): Type: IMPLANTABLE DEVICE | Site: HUMERUS | Status: FUNCTIONAL

## 2019-03-01 DEVICE — SCREW CORT 3.5X30MM ST HEX (4TX6+1TX4+1TX3=31)(SDS=22): Type: IMPLANTABLE DEVICE | Site: HUMERUS | Status: FUNCTIONAL

## 2019-03-01 DEVICE — SCREW LOCK 3.5X45MM ST T15 - (4SFLX8+LPPELVX2=34): Type: IMPLANTABLE DEVICE | Site: HUMERUS | Status: FUNCTIONAL

## 2019-03-01 DEVICE — SCREW LOCK 3.5X42MM ST - (4SFLX8=32) W/STARDRIVE: Type: IMPLANTABLE DEVICE | Site: HUMERUS | Status: FUNCTIONAL

## 2019-03-01 DEVICE — SCREW ASIF CANC 4.0X28 FT (5TX2=10) SDS(16): Type: IMPLANTABLE DEVICE | Site: HUMERUS | Status: FUNCTIONAL

## 2019-03-01 DEVICE — SCREW CORT 3.5X22MM ST HEX (4TX6+1TX4+1TX3=31)(SDS=22): Type: IMPLANTABLE DEVICE | Site: HUMERUS | Status: FUNCTIONAL

## 2019-03-01 DEVICE — WIRE K 1.6 X 150 292.16 (10EA/PK) (11TX10+2TX6+1TX20=142)(CYC=30): Type: IMPLANTABLE DEVICE | Site: HUMERUS | Status: FUNCTIONAL

## 2019-03-01 RX ORDER — HYDROMORPHONE HYDROCHLORIDE 1 MG/ML
0.2 INJECTION, SOLUTION INTRAMUSCULAR; INTRAVENOUS; SUBCUTANEOUS
Status: DISCONTINUED | OUTPATIENT
Start: 2019-03-01 | End: 2019-03-01 | Stop reason: HOSPADM

## 2019-03-01 RX ORDER — OXYCODONE HYDROCHLORIDE AND ACETAMINOPHEN 5; 325 MG/1; MG/1
1-2 TABLET ORAL EVERY 8 HOURS PRN
Status: ON HOLD | COMMUNITY
End: 2019-03-02

## 2019-03-01 RX ORDER — LORAZEPAM 2 MG/ML
.5-1 INJECTION INTRAMUSCULAR
Status: DISCONTINUED | OUTPATIENT
Start: 2019-03-01 | End: 2019-03-02 | Stop reason: HOSPADM

## 2019-03-01 RX ORDER — ACETAMINOPHEN 325 MG/1
650 TABLET ORAL EVERY 6 HOURS PRN
Status: DISCONTINUED | OUTPATIENT
Start: 2019-03-01 | End: 2019-03-02 | Stop reason: HOSPADM

## 2019-03-01 RX ORDER — HALOPERIDOL 5 MG/ML
1 INJECTION INTRAMUSCULAR
Status: DISCONTINUED | OUTPATIENT
Start: 2019-03-01 | End: 2019-03-01 | Stop reason: HOSPADM

## 2019-03-01 RX ORDER — ONDANSETRON 4 MG/1
4 TABLET, ORALLY DISINTEGRATING ORAL EVERY 4 HOURS PRN
Status: DISCONTINUED | OUTPATIENT
Start: 2019-03-01 | End: 2019-03-02 | Stop reason: HOSPADM

## 2019-03-01 RX ORDER — OXYCODONE HCL 5 MG/5 ML
5 SOLUTION, ORAL ORAL
Status: COMPLETED | OUTPATIENT
Start: 2019-03-01 | End: 2019-03-01

## 2019-03-01 RX ORDER — DIPHENHYDRAMINE HCL 25 MG
25 TABLET ORAL EVERY 8 HOURS PRN
Status: DISCONTINUED | OUTPATIENT
Start: 2019-03-01 | End: 2019-03-02 | Stop reason: HOSPADM

## 2019-03-01 RX ORDER — LISINOPRIL 20 MG/1
20 TABLET ORAL EVERY MORNING
Status: DISCONTINUED | OUTPATIENT
Start: 2019-03-02 | End: 2019-03-02 | Stop reason: HOSPADM

## 2019-03-01 RX ORDER — MEDROXYPROGESTERONE ACETATE 10 MG/1
10 TABLET ORAL DAILY
COMMUNITY
End: 2019-03-13

## 2019-03-01 RX ORDER — HYDROMORPHONE HYDROCHLORIDE 1 MG/ML
.5-1 INJECTION, SOLUTION INTRAMUSCULAR; INTRAVENOUS; SUBCUTANEOUS
Status: DISCONTINUED | OUTPATIENT
Start: 2019-03-01 | End: 2019-03-02 | Stop reason: HOSPADM

## 2019-03-01 RX ORDER — NICOTINE 21 MG/24HR
14 PATCH, TRANSDERMAL 24 HOURS TRANSDERMAL
Status: DISCONTINUED | OUTPATIENT
Start: 2019-03-01 | End: 2019-03-02 | Stop reason: HOSPADM

## 2019-03-01 RX ORDER — OMEPRAZOLE 20 MG/1
20 CAPSULE, DELAYED RELEASE ORAL 2 TIMES DAILY PRN
COMMUNITY
End: 2021-12-08

## 2019-03-01 RX ORDER — ONDANSETRON 2 MG/ML
4 INJECTION INTRAMUSCULAR; INTRAVENOUS
Status: DISCONTINUED | OUTPATIENT
Start: 2019-03-01 | End: 2019-03-01 | Stop reason: HOSPADM

## 2019-03-01 RX ORDER — HYDROMORPHONE HYDROCHLORIDE 1 MG/ML
0.1 INJECTION, SOLUTION INTRAMUSCULAR; INTRAVENOUS; SUBCUTANEOUS
Status: DISCONTINUED | OUTPATIENT
Start: 2019-03-01 | End: 2019-03-01 | Stop reason: HOSPADM

## 2019-03-01 RX ORDER — DIPHENHYDRAMINE HYDROCHLORIDE 50 MG/ML
12.5 INJECTION INTRAMUSCULAR; INTRAVENOUS
Status: DISCONTINUED | OUTPATIENT
Start: 2019-03-01 | End: 2019-03-01 | Stop reason: HOSPADM

## 2019-03-01 RX ORDER — OXYCODONE HCL 5 MG/5 ML
10 SOLUTION, ORAL ORAL
Status: COMPLETED | OUTPATIENT
Start: 2019-03-01 | End: 2019-03-01

## 2019-03-01 RX ORDER — IBUPROFEN 800 MG/1
800 TABLET ORAL EVERY 8 HOURS PRN
Status: ON HOLD | COMMUNITY
End: 2019-03-22

## 2019-03-01 RX ORDER — HYDROMORPHONE HYDROCHLORIDE 1 MG/ML
0.4 INJECTION, SOLUTION INTRAMUSCULAR; INTRAVENOUS; SUBCUTANEOUS
Status: DISCONTINUED | OUTPATIENT
Start: 2019-03-01 | End: 2019-03-01 | Stop reason: HOSPADM

## 2019-03-01 RX ORDER — OXYCODONE HYDROCHLORIDE 5 MG/1
2.5-5 TABLET ORAL
Status: DISCONTINUED | OUTPATIENT
Start: 2019-03-01 | End: 2019-03-02 | Stop reason: HOSPADM

## 2019-03-01 RX ORDER — OXYCODONE HCL 5 MG/5 ML
SOLUTION, ORAL ORAL
Status: DISPENSED
Start: 2019-03-01 | End: 2019-03-02

## 2019-03-01 RX ORDER — ACYCLOVIR 50 MG/G
1 OINTMENT TOPICAL
COMMUNITY
End: 2019-03-13

## 2019-03-01 RX ORDER — ONDANSETRON 2 MG/ML
4 INJECTION INTRAMUSCULAR; INTRAVENOUS EVERY 4 HOURS PRN
Status: DISCONTINUED | OUTPATIENT
Start: 2019-03-01 | End: 2019-03-02 | Stop reason: HOSPADM

## 2019-03-01 RX ORDER — POLYETHYLENE GLYCOL 3350 17 G/17G
1 POWDER, FOR SOLUTION ORAL
Status: DISCONTINUED | OUTPATIENT
Start: 2019-03-01 | End: 2019-03-02 | Stop reason: HOSPADM

## 2019-03-01 RX ORDER — CITALOPRAM 20 MG/1
40 TABLET ORAL DAILY
Status: DISCONTINUED | OUTPATIENT
Start: 2019-03-02 | End: 2019-03-02 | Stop reason: HOSPADM

## 2019-03-01 RX ORDER — LISINOPRIL AND HYDROCHLOROTHIAZIDE 20; 12.5 MG/1; MG/1
1 TABLET ORAL EVERY MORNING
Status: DISCONTINUED | OUTPATIENT
Start: 2019-03-02 | End: 2019-03-01

## 2019-03-01 RX ORDER — CEFAZOLIN SODIUM 2 G/100ML
2 INJECTION, SOLUTION INTRAVENOUS EVERY 8 HOURS
Status: COMPLETED | OUTPATIENT
Start: 2019-03-01 | End: 2019-03-02

## 2019-03-01 RX ORDER — PROMETHAZINE HYDROCHLORIDE 25 MG/1
12.5-25 SUPPOSITORY RECTAL EVERY 4 HOURS PRN
Status: DISCONTINUED | OUTPATIENT
Start: 2019-03-01 | End: 2019-03-02 | Stop reason: HOSPADM

## 2019-03-01 RX ORDER — MORPHINE SULFATE 4 MG/ML
2-4 INJECTION, SOLUTION INTRAMUSCULAR; INTRAVENOUS
Status: DISCONTINUED | OUTPATIENT
Start: 2019-03-01 | End: 2019-03-02

## 2019-03-01 RX ORDER — OMEPRAZOLE 20 MG/1
20 CAPSULE, DELAYED RELEASE ORAL 2 TIMES DAILY
Status: DISCONTINUED | OUTPATIENT
Start: 2019-03-01 | End: 2019-03-02 | Stop reason: HOSPADM

## 2019-03-01 RX ORDER — BISACODYL 10 MG
10 SUPPOSITORY, RECTAL RECTAL
Status: DISCONTINUED | OUTPATIENT
Start: 2019-03-01 | End: 2019-03-02 | Stop reason: HOSPADM

## 2019-03-01 RX ORDER — SODIUM CHLORIDE AND POTASSIUM CHLORIDE 150; 900 MG/100ML; MG/100ML
INJECTION, SOLUTION INTRAVENOUS CONTINUOUS
Status: DISCONTINUED | OUTPATIENT
Start: 2019-03-01 | End: 2019-03-02 | Stop reason: HOSPADM

## 2019-03-01 RX ORDER — PROMETHAZINE HYDROCHLORIDE 25 MG/1
12.5-25 TABLET ORAL EVERY 4 HOURS PRN
Status: DISCONTINUED | OUTPATIENT
Start: 2019-03-01 | End: 2019-03-02 | Stop reason: HOSPADM

## 2019-03-01 RX ORDER — AMOXICILLIN 250 MG
2 CAPSULE ORAL 2 TIMES DAILY
Status: DISCONTINUED | OUTPATIENT
Start: 2019-03-01 | End: 2019-03-02 | Stop reason: HOSPADM

## 2019-03-01 RX ORDER — MEPERIDINE HYDROCHLORIDE 25 MG/ML
12.5 INJECTION INTRAMUSCULAR; INTRAVENOUS; SUBCUTANEOUS
Status: DISCONTINUED | OUTPATIENT
Start: 2019-03-01 | End: 2019-03-01 | Stop reason: HOSPADM

## 2019-03-01 RX ORDER — SODIUM CHLORIDE, SODIUM LACTATE, POTASSIUM CHLORIDE, CALCIUM CHLORIDE 600; 310; 30; 20 MG/100ML; MG/100ML; MG/100ML; MG/100ML
INJECTION, SOLUTION INTRAVENOUS CONTINUOUS
Status: DISCONTINUED | OUTPATIENT
Start: 2019-03-01 | End: 2019-03-01 | Stop reason: HOSPADM

## 2019-03-01 RX ORDER — HYDROCHLOROTHIAZIDE 12.5 MG/1
12.5 TABLET ORAL EVERY MORNING
Status: DISCONTINUED | OUTPATIENT
Start: 2019-03-02 | End: 2019-03-02 | Stop reason: HOSPADM

## 2019-03-01 RX ADMIN — MORPHINE SULFATE 4 MG: 4 INJECTION INTRAVENOUS at 13:20

## 2019-03-01 RX ADMIN — MORPHINE SULFATE 4 MG: 4 INJECTION INTRAVENOUS at 20:47

## 2019-03-01 RX ADMIN — DIPHENHYDRAMINE HCL 25 MG: 25 TABLET ORAL at 23:25

## 2019-03-01 RX ADMIN — OXYCODONE HYDROCHLORIDE 5 MG: 5 TABLET ORAL at 22:04

## 2019-03-01 RX ADMIN — POTASSIUM CHLORIDE AND SODIUM CHLORIDE: 900; 150 INJECTION, SOLUTION INTRAVENOUS at 20:41

## 2019-03-01 RX ADMIN — MORPHINE SULFATE 4 MG: 4 INJECTION INTRAVENOUS at 09:04

## 2019-03-01 RX ADMIN — ONDANSETRON 4 MG: 2 INJECTION INTRAMUSCULAR; INTRAVENOUS at 12:35

## 2019-03-01 RX ADMIN — OMEPRAZOLE 20 MG: 20 CAPSULE, DELAYED RELEASE ORAL at 20:40

## 2019-03-01 RX ADMIN — FENTANYL CITRATE 25 MCG: 50 INJECTION, SOLUTION INTRAMUSCULAR; INTRAVENOUS at 19:27

## 2019-03-01 RX ADMIN — ACETAMINOPHEN 650 MG: 325 TABLET, FILM COATED ORAL at 20:41

## 2019-03-01 RX ADMIN — CEFAZOLIN SODIUM 2 G: 2 INJECTION, SOLUTION INTRAVENOUS at 22:07

## 2019-03-01 RX ADMIN — Medication 10 MG: at 17:51

## 2019-03-01 RX ADMIN — HYDROMORPHONE HYDROCHLORIDE 1 MG: 1 INJECTION, SOLUTION INTRAMUSCULAR; INTRAVENOUS; SUBCUTANEOUS at 10:49

## 2019-03-01 RX ADMIN — POTASSIUM CHLORIDE AND SODIUM CHLORIDE: 900; 150 INJECTION, SOLUTION INTRAVENOUS at 11:59

## 2019-03-01 ASSESSMENT — ENCOUNTER SYMPTOMS
FEVER: 0
SPUTUM PRODUCTION: 0
NAUSEA: 1
MYALGIAS: 0
SHORTNESS OF BREATH: 0
EYE PAIN: 0
LOSS OF CONSCIOUSNESS: 0
HEMOPTYSIS: 0
FLANK PAIN: 0
FOCAL WEAKNESS: 0
CHILLS: 0
EYE DISCHARGE: 0
BLOOD IN STOOL: 0
SORE THROAT: 0
STRIDOR: 0
DIARRHEA: 0
BRUISES/BLEEDS EASILY: 0
EYE REDNESS: 0
COUGH: 0
SEIZURES: 0
NERVOUS/ANXIOUS: 0
VOMITING: 0
ABDOMINAL PAIN: 0
HALLUCINATIONS: 0
SPEECH CHANGE: 0
PALPITATIONS: 0
DIZZINESS: 1

## 2019-03-01 ASSESSMENT — LIFESTYLE VARIABLES
HAVE PEOPLE ANNOYED YOU BY CRITICIZING YOUR DRINKING: NO
EVER_SMOKED: YES
PACK_YEARS: 6
TOTAL SCORE: 1
EVER FELT BAD OR GUILTY ABOUT YOUR DRINKING: YES
PACK_YEARS: 12
EVER HAD A DRINK FIRST THING IN THE MORNING TO STEADY YOUR NERVES TO GET RID OF A HANGOVER: NO
EVER_SMOKED: YES
HAVE YOU EVER FELT YOU SHOULD CUT DOWN ON YOUR DRINKING: NO
ON A TYPICAL DAY WHEN YOU DRINK ALCOHOL HOW MANY DRINKS DO YOU HAVE: 3
AVERAGE NUMBER OF DAYS PER WEEK YOU HAVE A DRINK CONTAINING ALCOHOL: 1
CONSUMPTION TOTAL: NEGATIVE
HOW MANY TIMES IN THE PAST YEAR HAVE YOU HAD 5 OR MORE DRINKS IN A DAY: 0
TOTAL SCORE: 1
TOTAL SCORE: 1
ALCOHOL_USE: YES

## 2019-03-01 ASSESSMENT — PATIENT HEALTH QUESTIONNAIRE - PHQ9
9. THOUGHTS THAT YOU WOULD BE BETTER OFF DEAD, OR OF HURTING YOURSELF: SEVERAL DAYS
4. FEELING TIRED OR HAVING LITTLE ENERGY: NEARLY EVERY DAY
5. POOR APPETITE OR OVEREATING: NOT AT ALL
2. FEELING DOWN, DEPRESSED, IRRITABLE, OR HOPELESS: SEVERAL DAYS
8. MOVING OR SPEAKING SO SLOWLY THAT OTHER PEOPLE COULD HAVE NOTICED. OR THE OPPOSITE, BEING SO FIGETY OR RESTLESS THAT YOU HAVE BEEN MOVING AROUND A LOT MORE THAN USUAL: NOT AT ALL
1. LITTLE INTEREST OR PLEASURE IN DOING THINGS: NOT AT ALL
6. FEELING BAD ABOUT YOURSELF - OR THAT YOU ARE A FAILURE OR HAVE LET YOURSELF OR YOUR FAMILY DOWN: NEARLY EVERY DAY
SUM OF ALL RESPONSES TO PHQ QUESTIONS 1-9: 11
7. TROUBLE CONCENTRATING ON THINGS, SUCH AS READING THE NEWSPAPER OR WATCHING TELEVISION: NOT AT ALL
SUM OF ALL RESPONSES TO PHQ9 QUESTIONS 1 AND 2: 1
3. TROUBLE FALLING OR STAYING ASLEEP OR SLEEPING TOO MUCH: NEARLY EVERY DAY

## 2019-03-01 ASSESSMENT — COGNITIVE AND FUNCTIONAL STATUS - GENERAL
PERSONAL GROOMING: A LITTLE
DRESSING REGULAR LOWER BODY CLOTHING: A LOT
CLIMB 3 TO 5 STEPS WITH RAILING: A LITTLE
EATING MEALS: A LITTLE
DRESSING REGULAR UPPER BODY CLOTHING: A LOT
MOBILITY SCORE: 18
SUGGESTED CMS G CODE MODIFIER MOBILITY: CK
MOVING FROM LYING ON BACK TO SITTING ON SIDE OF FLAT BED: A LITTLE
TURNING FROM BACK TO SIDE WHILE IN FLAT BAD: A LITTLE
WALKING IN HOSPITAL ROOM: A LITTLE
TOILETING: A LITTLE
STANDING UP FROM CHAIR USING ARMS: A LITTLE
MOVING TO AND FROM BED TO CHAIR: A LITTLE
DAILY ACTIVITIY SCORE: 15
SUGGESTED CMS G CODE MODIFIER DAILY ACTIVITY: CK
HELP NEEDED FOR BATHING: A LOT

## 2019-03-01 ASSESSMENT — COPD QUESTIONNAIRES
COPD SCREENING SCORE: 3
HAVE YOU SMOKED AT LEAST 100 CIGARETTES IN YOUR ENTIRE LIFE: YES
DO YOU EVER COUGH UP ANY MUCUS OR PHLEGM?: NO/ONLY WITH OCCASIONAL COLDS OR INFECTIONS
IN THE PAST 12 MONTHS DO YOU DO LESS THAN YOU USED TO BECAUSE OF YOUR BREATHING PROBLEMS: DISAGREE/UNSURE
HAVE YOU SMOKED AT LEAST 100 CIGARETTES IN YOUR ENTIRE LIFE: YES
DURING THE PAST 4 WEEKS HOW MUCH DID YOU FEEL SHORT OF BREATH: NONE/LITTLE OF THE TIME
DURING THE PAST 4 WEEKS HOW MUCH DID YOU FEEL SHORT OF BREATH: NONE/LITTLE OF THE TIME
COPD SCREENING SCORE: 3
DO YOU EVER COUGH UP ANY MUCUS OR PHLEGM?: NO/ONLY WITH OCCASIONAL COLDS OR INFECTIONS

## 2019-03-01 NOTE — ASSESSMENT & PLAN NOTE
Denies having previous alcohol withdrawal.  Counseling provided  Watch for electrolyte abnormalities I am ordering CMP and magnesium and phosphorus  Lorazepam for seizures

## 2019-03-01 NOTE — ASSESSMENT & PLAN NOTE
Left humerus, closed comminuted and displaced  Orthopedic consulted  Pain control  PT OT postprocedure

## 2019-03-01 NOTE — H&P
Hospital Medicine History & Physical Note    Date of Service  3/1/2019    Primary Care Physician  Naveed Savage M.D.    Consultants  Umair Dubois      Code Status  FULL      Chief Complaint  Left shoulder pain     History of Presenting Illness  48 y.o. female with past medical history of alcoholism, anxiety, Hypertension, Gastroesophageal reflux disease and Obesity who was transferred from [facility]  on 3/1/2019 with left upper extremity pain after a fall.  The pain after left arm and shoulder, moderate to severe in intensity 6-9 from 10.  Constant, throbbing in nature.  Worse with movement.  Does not radiate to other places.  The patient can move her fingers on the arm.  She also denies altered sensation in her fingers or hand distal to the fracture.    Review of Systems  Review of Systems   Constitutional: Negative for chills and fever.   HENT: Negative for congestion and sore throat.    Eyes: Negative for pain, discharge and redness.   Respiratory: Negative for cough, hemoptysis, sputum production, shortness of breath and stridor.    Cardiovascular: Negative for chest pain, palpitations and leg swelling.   Gastrointestinal: Positive for nausea. Negative for abdominal pain, blood in stool, diarrhea and vomiting.   Genitourinary: Negative for flank pain, hematuria and urgency.   Musculoskeletal: Positive for joint pain (left shoulder ). Negative for myalgias.   Skin: Negative.    Neurological: Positive for dizziness. Negative for speech change, focal weakness, seizures and loss of consciousness.   Endo/Heme/Allergies: Does not bruise/bleed easily.   Psychiatric/Behavioral: Negative for hallucinations and suicidal ideas. The patient is not nervous/anxious.        Past Medical History  Anxiety  Hypertension   Gastroesophageal reflux disease  Obesity  Alcoholism     Surgical History   has a past surgical history that includes tubal ligation; tonsillectomy; tympanoplasty (11/6/2012); knee arthroscopy  "(12/10/2014); medial meniscectomy (12/10/2014); meniscectomy (12/10/2014); lateral release (12/10/2014); gastric sleeve laparoscopy (N/A, 2/3/2016); and gastroscopy (5/17/2017).     Family History  family history includes Breast Cancer in her father.     Social History   reports that she quit smoking about 2 weeks ago. Her smoking use included Cigarettes. She has a 12.00 pack-year smoking history. She has never used smokeless tobacco. She reports that she drinks alcohol. She reports that she does not use drugs.    Allergies  Allergies   Allergen Reactions   • Aspirin Vomiting   • Other Misc      Waterproof band aids took off skin and caused infection    • Nsaids Unspecified     Pt states not allergic to ALL nsaids, just \"not supposed to take them after gastric sleeve surgery\"       Medications  Prior to Admission Medications   Prescriptions Last Dose Informant Patient Reported? Taking?   ARIPiprazole (ABILIFY) 5 MG tablet   No No   Sig: Take 1 Tab by mouth every day.   Phenylephrine-DM-GG (TUSSIN CF PO)   Yes No   Sig: Take  by mouth.   citalopram (CELEXA) 20 MG Tab  Patient Yes No   Sig: Take 20 mg by mouth every morning.   fluticasone (FLOVENT HFA) 220 MCG/ACT Aerosol  Patient No No   Sig: Inhale 1 Puff by mouth 2 times a day.   lisinopril-hydrochlorothiazide (PRINZIDE, ZESTORETIC) 20-12.5 MG per tablet  Patient Yes No   Sig: Take 1 Tab by mouth every morning.   medroxyPROGESTERone (PROVERA) 10 MG Tab   Yes No   Sig: Take 10 mg by mouth every day.   multivitamin (THERAGRAN) Tab  Patient Yes No   Sig: Take 1 Tab by mouth every day.   omeprazole (PRILOSEC) 40 MG delayed-release capsule  Patient No No   Sig: Take 1 Cap by mouth every day.   ondansetron (ZOFRAN ODT) 4 MG TABLET DISPERSIBLE   No No   Sig: Take 1 Tab by mouth every 8 hours as needed for Nausea.   valacyclovir (VALTREX) 1 GM Tab  Patient No No   Sig: Take 1 Tab by mouth 2 times a day.      Facility-Administered Medications: None       Physical " Exam  Temp:  [36.6 °C (97.8 °F)-36.8 °C (98.2 °F)] 36.6 °C (97.8 °F)  Pulse:  [72-87] 87  Resp:  [14-17] 14  BP: (123-148)/(59-83) 128/59  SpO2:  [93 %-98 %] 95 %    Physical Exam   Constitutional: She is oriented to person, place, and time. She appears well-developed and well-nourished. No distress.   HENT:   Head: Normocephalic and atraumatic.   Right Ear: External ear normal.   Left Ear: External ear normal.   Eyes: Pupils are equal, round, and reactive to light. Conjunctivae are normal. Right eye exhibits no discharge. Left eye exhibits no discharge.   Neck: Normal range of motion. Neck supple. No JVD present. No tracheal deviation present. No thyromegaly present.   Cardiovascular: Exam reveals no gallop and no friction rub.    No murmur heard.  Pulmonary/Chest: Effort normal and breath sounds normal. No stridor. No respiratory distress. She has no wheezes. She has no rales. She exhibits no tenderness.   Abdominal: Soft. Bowel sounds are normal. She exhibits no distension. There is no tenderness. There is no rebound and no guarding.   Musculoskeletal: She exhibits edema and tenderness. She exhibits no deformity.   left shoulder edema and tenderness and reduced ROM   Neurological: She is alert and oriented to person, place, and time. No cranial nerve deficit. Coordination normal.   Skin: Skin is warm and dry. No rash noted. She is not diaphoretic. No erythema. No pallor.   Psychiatric: She has a normal mood and affect. Her behavior is normal. Judgment normal.   Nursing note and vitals reviewed.      Laboratory:          No results for input(s): ALTSGPT, ASTSGOT, ALKPHOSPHAT, TBILIRUBIN, DBILIRUBIN, GAMMAGT, AMYLASE, LIPASE, ALB, PREALBUMIN, GLUCOSE in the last 72 hours.              No results for input(s): TROPONINI in the last 72 hours.    Labs from transferring facility   Blood alcohol at 0.23  CBC and CMP unremarkable      Urinalysis:    No results found     Imaging:  OUTSIDE IMAGES-DX UPPER EXTREMITY, LEFT    Final Result      OUTSIDE IMAGES-DX UPPER EXTREMITY, LEFT   Final Result      DX-PORTABLE FLUORO > 1 HOUR    (Results Pending)   DX-HUMERUS 2+ LEFT    (Results Pending)         Assessment/Plan:  I anticipate this patient is appropriate for observation status at this time.    * Fracture of left humerus- (present on admission)   Assessment & Plan    Left humerus, closed comminuted and displaced  Orthopedic consulted  Pain control  PT OT postprocedure     Tobacco use- (present on admission)   Assessment & Plan    I Spent nearly 4-5 mins on Tobacco cessation education.   Discussed the benefits of quitting smoking and risks of continued smoking including cardiovascular disease, cancer and COPD.   Discussed options of nicotine patch, medical treatment with wellbutrin     Obesity- (present on admission)   Assessment & Plan    Body mass index is 52.13 kg/m².  Counseling provided.    I explained that there is increase in mortality and morbidity associated with excess weight.  There is increased risk of diabetes, hypertension, heart disease, cerebrovascular accidents, sleep apnea and cancer.    I encouraged the patient to lose weight by reducing caloric intake, I encouraged low-carb diet in addition to aim for 120-150 minutes of exercise a week.       Gastroesophageal reflux disease without esophagitis- (present on admission)   Assessment & Plan    Omeprazole     Hypertension   Assessment & Plan    Lisinopril hydrochlorothiazide     Alcoholism (HCC)- (present on admission)   Assessment & Plan    Denies having previous alcohol withdrawal.  Counseling provided  Watch for electrolyte abnormalities I am ordering CMP and magnesium and phosphorus  Lorazepam for seizures     Depression- (present on admission)   Assessment & Plan    Stable, denies suicide and homicide ideation at this point  Citalopram       VTE prophylaxis: SCDs, anticipated surgery

## 2019-03-01 NOTE — ED PROVIDER NOTES
ED Provider Note    Scribed for Waldemar Polk M.D. by Mai Green. 3/1/2019  6:55 AM    Primary care provider: Naveed Savage M.D.  Means of arrival: Ambulance  History obtained from: Nursing Staff  History limited by: Alcohol Intoxication    CHIEF COMPLAINT  •  Extremity Pain  •  Extremity Fracture    [facility]  Nneka Ruiz is a 48 y.o. female who presents to the Emergency Department by ambulance for extremity pain and fracture with an onset of today. Per nursing staff, the patient experienced a ground level fall while intoxicated. She sustained an injury to her left upper extremity and complained of constant pain. She was evaluated at [facility] where she was found to have a spiral fracture to her left humerus. She was transferred for specialized care. Further history is unobtainable secondary to alcohol intoxication. She was reportedly medicated with 50 mg Fentanyl and 2 mg of Ativan.      REVIEW OF SYSTEMS  ROS is unobtainable secondary to alcohol intoxication. See HPI for further details.  C.       PAST MEDICAL HISTORY  Patient has a past medical history of Anemia; Anxiety; Cold; Dental disorder; Hypertension; Pain (2014); and Psychiatric problem.      SURGICAL HISTORY  Patient has a past surgical history that includes tubal ligation; tonsillectomy; tympanoplasty (11/6/2012); knee arthroscopy (12/10/2014); medial meniscectomy (12/10/2014); meniscectomy (12/10/2014); lateral release (12/10/2014); gastric sleeve laparoscopy (N/A, 2/3/2016); and gastroscopy (5/17/2017).      SOCIAL HISTORY  Social History   Substance Use Topics   • Smoking status: Current Every Day Smoker     Packs/day: 1.00     Years: 12.00     Types: Cigarettes     Last attempt to quit: 12/1/2017   • Smokeless tobacco: Never Used      Comment: 2 yrs  1/2 ppd   • Alcohol use 0.0 oz/week      Comment: 0-2 drinks per month      History   Drug Use No     Comment: meth  non since 2001       FAMILY HISTORY  History reviewed. No pertinent  "family history.       CURRENT MEDICATIONS  Home Medications     Reviewed by Omari Blackwood (Pharmacy Tech) on 03/01/19 at 0818  Med List Status: Complete   Medication Last Dose Status   acyclovir (ZOVIRAX) 5 % Ointment 2/28/2019 Active   citalopram (CELEXA) 40 MG Tab 2/28/2019 Active   ibuprofen (MOTRIN) 800 MG Tab PRN Active   lisinopril-hydrochlorothiazide (PRINZIDE, ZESTORETIC) 20-12.5 MG per tablet 2/28/2019 Active   medroxyPROGESTERone (PROVERA) 10 MG Tab 2/28/2019 Active   omeprazole (PRILOSEC) 20 MG delayed-release capsule PRN Active   oxyCODONE-acetaminophen (PERCOCET) 5-325 MG Tab PRN Active                ALLERGIES  Allergies   Allergen Reactions   • Aspirin Vomiting   • Other Misc      Waterproof band aids took off skin and caused infection        PHYSICAL EXAM  VITAL SIGNS: /73   Pulse 72   Temp 36.8 °C (98.2 °F) (Temporal)   Resp 17   Ht 1.702 m (5' 7\")   Wt (!) 149.7 kg (330 lb)   SpO2 95%   BMI 51.69 kg/m²     Nursing note and vitals reviewed.    Constitutional: Heavily medicated and/or intoxicated. No distress.   HENT: Head is normocephalic and atraumatic. Oropharynx is clear and moist without exudate or erythema.   Eyes: Pupils are equal, round, and reactive to light. Conjunctiva are normal.   Cardiovascular: Normal rate and regular rhythm. No murmur heard. Normal radial pulses.  Pulmonary/Chest: Breath sounds normal. No wheezes or rales.   Musculoskeletal: Left arm in ACE wrap with tenderness to left proximal humerus.   Neurological:  Heavily medicated and/or intoxicated. No focal deficits noted.  Skin: Skin is warm and dry. No rash.   Psychiatric: Unable to assess.      DIAGNOSTIC STUDIES / PROCEDURES    RADIOLOGY  OUTSIDE IMAGES-DX UPPER EXTREMITY, LEFT   Final Result      OUTSIDE IMAGES-DX UPPER EXTREMITY, LEFT   Final Result        The radiologist's interpretation of all radiological studies have been reviewed by me.      COURSE & MEDICAL DECISION MAKING  Pertinent Labs & " Imaging studies reviewed. (See chart for details)    7:00 AM Patient seen and examined at bedside for extremity pain and fracture.      7:07 AM Obtained and reviewed outlying records which indicate blood alcohol 0.23, CBC normal, CMP normal.    7:11 AM Paged Orthopaedics.     7:41 AM Spoke with Dr. Block, Orthopaedics, regarding the patient's presenting symptoms and diagnostic results. He will consult. Asked for the patient to be admitted to medicine.    7:52 AM  Spoke with Dr. Klein, Hospitalist, regarding the patient's presenting symptoms and diagnostic results. They will consult and admit the patient for further evaluation and care.        Decision Making:  This is a 48 y.o. female that presents as a transfer from [facility] for a left humerus fracture. She reportedly fell while intoxicated. Dr. Block, Orthopaedics, will consult. Admit to medicine.        DISPOSITION  Patient will be admitted to Dr. Klein, Hospitalist, in stable condition.      DIAGNOSIS  1. Closed displaced spiral fracture of shaft of left humerus, initial encounter    2. Acute alcoholic intoxication with delirium (HCC)           Mai RIOS (Scribe), am scribing for, and in the presence of, Waldemar Polk M.D.    Electronically signed by: Mai Green (Bryan), 3/1/2019    Waldemar RIOS M.D. personally performed the services described in this documentation, as scribed by Mai Green in my presence, and it is both accurate and complete. C.     The note accurately reflects work and decisions made by me.  Waldemar Polk  3/1/2019  11:27 AM

## 2019-03-01 NOTE — ED TRIAGE NOTES
"Pt transferred from  [facility] for a displaced left humerus fx medicated at 0540 with 25mg fentanyl and 1mg ativan, and again at 610 with 25mg fentanyl and 1 mg ativan, pt currently not c/o pain at this time. Blood pressure 123/73, pulse 72, temperature 36.8 °C (98.2 °F), temperature source Temporal, resp. rate 17, height 1.702 m (5' 7\"), weight (!) 149.7 kg (330 lb), SpO2 95 %, not currently breastfeeding.      "

## 2019-03-01 NOTE — ED NOTES
RN explained to pt multiple times that she is NPO, nothing to eat or drink due to the possibility of surgery. Pt family came back to room and pt was eating ice chips/drinking. RN re-educated pt on importance of no eating or drinking due to the possibility of surgery.

## 2019-03-01 NOTE — DISCHARGE PLANNING
Pt lives in [place] with her adult daughter who will pick her up when discharged. Physical address is: [address] in [place]. May need out-pt PT when discharged.    Care Transition Team Assessment    Information Source  Orientation : Oriented x 4  Information Given By: Patient  Who is responsible for making decisions for patient? : Patient    Readmission Evaluation  Is this a readmission?: No    Elopement Risk  Legal Hold: No  Ambulatory or Self Mobile in Wheelchair: No-Not an Elopement Risk    Interdisciplinary Discharge Planning  Does Admitting Nurse Feel This Could be a Complex Discharge?: No  Primary Care Physician: Naveed Savage  Lives with - Patient's Self Care Capacity: Adult Children  Support Systems: Children  Do You Take your Prescribed Medications Regularly: Yes  Able to Return to Previous ADL's: Future Time w/Therapy  Mobility Issues: No  Prior Services: None  Patient Expects to be Discharged to:: home  Assistance Needed: Unknown at this Time  Durable Medical Equipment: Not Applicable    Discharge Preparedness  What is your plan after discharge?: Home with help  What are your discharge supports?: Child  Prior Functional Level: Independent with Activities of Daily Living  Difficulity with ADLs: None  Difficulity with IADLs: None    Functional Assesment  Prior Functional Level: Independent with Activities of Daily Living    Finances  Financial Barriers to Discharge: No  Prescription Coverage: Yes       Domestic Abuse  Have you ever been the victim of abuse or violence?: No         Discharge Risks or Barriers  Discharge risks or barriers?: No    Anticipated Discharge Information  Anticipated discharge disposition: Home  Discharge Address: see note

## 2019-03-01 NOTE — ED NOTES
Med Rec completed per patient's home pharmacy ([facility])  Allergies reviewed  No ORAL antibiotics in last 30 days

## 2019-03-01 NOTE — CONSULTS
DATE OF SERVICE:  03/01/2019    ORTHOPEDIC CONSULTATION    REQUESTING PHYSICIAN:  Waldemar Polk MD, emergency department    REASON FOR CONSULTATION:  Left humerus fracture.    CHIEF COMPLAINT:  Left arm pain.    HISTORY OF PRESENT ILLNESS:  Patient is a 48-year-old female and she lives in   [place].  She states that she fell going to the bathroom last night and landed   on her left arm against the wall and injured it.  She presented to [facility] by ambulance.  She was found to have a comminuted left   humerus shaft fracture.  She was subsequently transferred to [facility] for   further evaluation where she was seen this morning and confirmed to have a   left comminuted displaced humerus shaft fracture at proximal third.  She was   reportedly intoxicated upon presentation earlier this morning.  This seems to   have resolved.  She states she recently had a D and C procedure performed, but   denies any other obvious injuries related to the fall.    ALLERGIES:  TO ASPIRIN.    OUTPATIENT MEDICATIONS:  Include ibuprofen, Percocet, Prilosec,   medroxyprogesterone, acyclovir, lisinopril/hydrochlorothiazide, and   citalopram.    PAST MEDICAL DIAGNOSES:  Include anxiety, hypertension, and anemia.    PAST SURGICAL HISTORY:  Tympanoplasty, tubal ligation, left knee medial and   lateral meniscectomy, gastric sleeve in 2016.    FAMILY HISTORY:  Significant for breast cancer in her father, according to the   medical record.    SOCIAL HISTORY:  Patient smokes about a pack of cigarettes a day, drinks a   couple of alcoholic beverages a month.  She has a remote history of   methamphetamine use, but not since 2001.    REVIEW OF SYSTEMS:  She denies fevers, chills, nausea, vomiting, shortness of   breath, chest pain.  Otherwise, normal per AMA criteria other than that stated   in the HPI.    PHYSICAL EXAMINATION:  VITAL SIGNS:  Temperature 98.2, heart rate 83, respiration rate 16, blood   pressure 113/58, pulse  oximetry is 94% on room air.  GENERAL APPEARANCE:  Patient is alert.  She is oriented.  She is in no acute   distress.  She is pleasant and cooperative.  HEAD, EYES, EARS, NOSE, AND THROAT:  Normocephalic and atraumatic.  Mucous   membranes are moist.  PULMONARY:  Symmetric, unlabored breathing.  CARDIOVASCULAR:  Extremities are well perfused.  No obvious JVP is noted.  ABDOMEN:  Obese.  MUSCULOSKELETAL:  Left upper extremity, she has a shoulder immobilizer in   place as well as an Ace bandage swath.  She has no obvious open wounds to the   left arm.  There is no obvious ecchymosis or significant swelling.  She does   have sensation intact to light touch in the axillary, median, radial, and   ulnar nerve distribution.  She is able to demonstrate normal motor function   with AIN, PIN, median, radial, and ulnar nerve distribution.  She has a   palpable radial pulse.  There is no evidence of obvious traumatic deformity to   the right upper or bilateral lower extremities, which are grossly   neurovascularly intact.    RADIOGRAPHIC DATA:  Plain x-rays from an outside facility including the left   shoulder and left humerus show a comminuted displaced proximal third humerus   shaft fracture with some angulation.    ASSESSMENT:  A 48-year-old female with left comminuted displaced proximal   third humerus shaft fracture.  She was reportedly intoxicated upon arrival to   the emergency department, but this seems to have resolved.  At this point, she   is mentating appropriately.  This appears to be an isolated injury.    RECOMMENDATIONS:  1.  I discussed these findings with the patient and we discussed treatment   options including both nonoperative and operative management.  We discussed   pros and cons of both including potential risks of surgery such as infection,   nonunion, malunion, potential for injury to neurovascular structures,   specifically the radial nerve, blood loss, potential for implant prominence   requiring  removal, potential for shoulder and elbow stiffness as well as   general risks of anesthesia including heart attack, stroke, and death.  She   expressed understanding and prefers surgical management over nonoperative   management for this injury, which I feel is very reasonable.  2.  Patient is currently n.p.o.  I will try to make preparations to get her to   the operating room later this afternoon for surgical reduction and fixation   of her left comminuted displaced proximal third humerus shaft fracture.  3.  She should continue to be nonweightbearing to left upper extremity with   the sling and n.p.o. while awaiting surgical management.       ____________________________________     MD TIFFANY Borges / JASMINE    DD:  03/01/2019 12:43:14  DT:  03/01/2019 13:02:46    D#:  [id number]  Job#:  [id number]

## 2019-03-01 NOTE — ASSESSMENT & PLAN NOTE
Body mass index is 52.13 kg/m².  Counseling provided.    I explained that there is increase in mortality and morbidity associated with excess weight.  There is increased risk of diabetes, hypertension, heart disease, cerebrovascular accidents, sleep apnea and cancer.    I encouraged the patient to lose weight by reducing caloric intake, I encouraged low-carb diet in addition to aim for 120-150 minutes of exercise a week.

## 2019-03-01 NOTE — ASSESSMENT & PLAN NOTE
I Spent nearly 4-5 mins on Tobacco cessation education.   Discussed the benefits of quitting smoking and risks of continued smoking including cardiovascular disease, cancer and COPD.   Discussed options of nicotine patch, medical treatment with wellbutrin

## 2019-03-01 NOTE — PROGRESS NOTES
2 RN skin check complete.    All bony prominences intact, no indication of skin breakdown or pressure injury.

## 2019-03-01 NOTE — ED NOTES
Pt resting in bed, breathing equal/unlabored. No distress noted. CMS intact, + radial pulse left wrist. Will continue to monitor.

## 2019-03-02 ENCOUNTER — PATIENT OUTREACH (OUTPATIENT)
Dept: HEALTH INFORMATION MANAGEMENT | Facility: OTHER | Age: 49
End: 2019-03-02

## 2019-03-02 VITALS
RESPIRATION RATE: 16 BRPM | WEIGHT: 293 LBS | OXYGEN SATURATION: 93 % | BODY MASS INDEX: 45.99 KG/M2 | SYSTOLIC BLOOD PRESSURE: 143 MMHG | DIASTOLIC BLOOD PRESSURE: 73 MMHG | TEMPERATURE: 97.8 F | HEIGHT: 67 IN | HEART RATE: 86 BPM

## 2019-03-02 LAB
ALBUMIN SERPL BCP-MCNC: 3.5 G/DL (ref 3.2–4.9)
ALBUMIN/GLOB SERPL: 1.2 G/DL
ALP SERPL-CCNC: 75 U/L (ref 30–99)
ALT SERPL-CCNC: 7 U/L (ref 2–50)
ANION GAP SERPL CALC-SCNC: 7 MMOL/L (ref 0–11.9)
AST SERPL-CCNC: 12 U/L (ref 12–45)
BASOPHILS # BLD AUTO: 0.1 % (ref 0–1.8)
BASOPHILS # BLD: 0.01 K/UL (ref 0–0.12)
BILIRUB SERPL-MCNC: 0.6 MG/DL (ref 0.1–1.5)
BUN SERPL-MCNC: 13 MG/DL (ref 8–22)
CALCIUM SERPL-MCNC: 8.9 MG/DL (ref 8.5–10.5)
CHLORIDE SERPL-SCNC: 103 MMOL/L (ref 96–112)
CO2 SERPL-SCNC: 23 MMOL/L (ref 20–33)
CREAT SERPL-MCNC: 0.62 MG/DL (ref 0.5–1.4)
EOSINOPHIL # BLD AUTO: 0 K/UL (ref 0–0.51)
EOSINOPHIL NFR BLD: 0 % (ref 0–6.9)
ERYTHROCYTE [DISTWIDTH] IN BLOOD BY AUTOMATED COUNT: 42.8 FL (ref 35.9–50)
GLOBULIN SER CALC-MCNC: 2.9 G/DL (ref 1.9–3.5)
GLUCOSE SERPL-MCNC: 120 MG/DL (ref 65–99)
HCT VFR BLD AUTO: 33.3 % (ref 37–47)
HGB BLD-MCNC: 11.2 G/DL (ref 12–16)
IMM GRANULOCYTES # BLD AUTO: 0.03 K/UL (ref 0–0.11)
IMM GRANULOCYTES NFR BLD AUTO: 0.3 % (ref 0–0.9)
LYMPHOCYTES # BLD AUTO: 1.45 K/UL (ref 1–4.8)
LYMPHOCYTES NFR BLD: 16.1 % (ref 22–41)
MAGNESIUM SERPL-MCNC: 1.7 MG/DL (ref 1.5–2.5)
MCH RBC QN AUTO: 31 PG (ref 27–33)
MCHC RBC AUTO-ENTMCNC: 33.6 G/DL (ref 33.6–35)
MCV RBC AUTO: 92.2 FL (ref 81.4–97.8)
MONOCYTES # BLD AUTO: 0.61 K/UL (ref 0–0.85)
MONOCYTES NFR BLD AUTO: 6.8 % (ref 0–13.4)
NEUTROPHILS # BLD AUTO: 6.93 K/UL (ref 2–7.15)
NEUTROPHILS NFR BLD: 76.7 % (ref 44–72)
NRBC # BLD AUTO: 0 K/UL
NRBC BLD-RTO: 0 /100 WBC
PHOSPHATE SERPL-MCNC: 2.7 MG/DL (ref 2.5–4.5)
PLATELET # BLD AUTO: 213 K/UL (ref 164–446)
PMV BLD AUTO: 10.8 FL (ref 9–12.9)
POTASSIUM SERPL-SCNC: 4.7 MMOL/L (ref 3.6–5.5)
PROT SERPL-MCNC: 6.4 G/DL (ref 6–8.2)
RBC # BLD AUTO: 3.61 M/UL (ref 4.2–5.4)
SODIUM SERPL-SCNC: 133 MMOL/L (ref 135–145)
WBC # BLD AUTO: 9 K/UL (ref 4.8–10.8)

## 2019-03-02 PROCEDURE — A9270 NON-COVERED ITEM OR SERVICE: HCPCS | Performed by: HOSPITALIST

## 2019-03-02 PROCEDURE — 700101 HCHG RX REV CODE 250: Performed by: HOSPITALIST

## 2019-03-02 PROCEDURE — G0378 HOSPITAL OBSERVATION PER HR: HCPCS

## 2019-03-02 PROCEDURE — 700102 HCHG RX REV CODE 250 W/ 637 OVERRIDE(OP): Performed by: HOSPITALIST

## 2019-03-02 PROCEDURE — 80053 COMPREHEN METABOLIC PANEL: CPT

## 2019-03-02 PROCEDURE — 84100 ASSAY OF PHOSPHORUS: CPT

## 2019-03-02 PROCEDURE — 85025 COMPLETE CBC W/AUTO DIFF WBC: CPT

## 2019-03-02 PROCEDURE — 700111 HCHG RX REV CODE 636 W/ 250 OVERRIDE (IP): Performed by: HOSPITALIST

## 2019-03-02 PROCEDURE — 700111 HCHG RX REV CODE 636 W/ 250 OVERRIDE (IP): Performed by: ORTHOPAEDIC SURGERY

## 2019-03-02 PROCEDURE — 90686 IIV4 VACC NO PRSV 0.5 ML IM: CPT | Performed by: HOSPITALIST

## 2019-03-02 PROCEDURE — 99217 PR OBSERVATION CARE DISCHARGE: CPT | Performed by: HOSPITALIST

## 2019-03-02 PROCEDURE — 36415 COLL VENOUS BLD VENIPUNCTURE: CPT

## 2019-03-02 PROCEDURE — 83735 ASSAY OF MAGNESIUM: CPT

## 2019-03-02 PROCEDURE — 90471 IMMUNIZATION ADMIN: CPT

## 2019-03-02 PROCEDURE — 97166 OT EVAL MOD COMPLEX 45 MIN: CPT

## 2019-03-02 RX ORDER — OXYCODONE HYDROCHLORIDE AND ACETAMINOPHEN 5; 325 MG/1; MG/1
1-2 TABLET ORAL EVERY 8 HOURS PRN
Qty: 30 TAB | Refills: 0 | Status: SHIPPED | OUTPATIENT
Start: 2019-03-02 | End: 2019-03-09

## 2019-03-02 RX ORDER — OXYCODONE HYDROCHLORIDE 10 MG/1
10 TABLET ORAL EVERY 4 HOURS PRN
Status: DISCONTINUED | OUTPATIENT
Start: 2019-03-02 | End: 2019-03-02 | Stop reason: HOSPADM

## 2019-03-02 RX ORDER — MORPHINE SULFATE 4 MG/ML
2-4 INJECTION, SOLUTION INTRAMUSCULAR; INTRAVENOUS
Status: DISCONTINUED | OUTPATIENT
Start: 2019-03-02 | End: 2019-03-02 | Stop reason: HOSPADM

## 2019-03-02 RX ADMIN — SENNOSIDES AND DOCUSATE SODIUM 2 TABLET: 8.6; 5 TABLET ORAL at 04:09

## 2019-03-02 RX ADMIN — OXYCODONE HYDROCHLORIDE 10 MG: 10 TABLET ORAL at 04:10

## 2019-03-02 RX ADMIN — LISINOPRIL 20 MG: 20 TABLET ORAL at 04:10

## 2019-03-02 RX ADMIN — OXYCODONE HYDROCHLORIDE 5 MG: 5 TABLET ORAL at 01:04

## 2019-03-02 RX ADMIN — OMEPRAZOLE 20 MG: 20 CAPSULE, DELAYED RELEASE ORAL at 04:09

## 2019-03-02 RX ADMIN — POTASSIUM CHLORIDE AND SODIUM CHLORIDE: 900; 150 INJECTION, SOLUTION INTRAVENOUS at 05:15

## 2019-03-02 RX ADMIN — INFLUENZA A VIRUS A/MICHIGAN/45/2015 X-275 (H1N1) ANTIGEN (FORMALDEHYDE INACTIVATED), INFLUENZA A VIRUS A/SINGAPORE/INFIMH-16-0019/2016 IVR-186 (H3N2) ANTIGEN (FORMALDEHYDE INACTIVATED), INFLUENZA B VIRUS B/PHUKET/3073/2013 ANTIGEN (FORMALDEHYDE INACTIVATED), AND INFLUENZA B VIRUS B/MARYLAND/15/2016 BX-69A ANTIGEN (FORMALDEHYDE INACTIVATED) 0.5 ML: 15; 15; 15; 15 INJECTION, SUSPENSION INTRAMUSCULAR at 05:15

## 2019-03-02 RX ADMIN — CITALOPRAM HYDROBROMIDE 40 MG: 20 TABLET ORAL at 04:10

## 2019-03-02 RX ADMIN — OXYCODONE HYDROCHLORIDE 10 MG: 10 TABLET ORAL at 07:59

## 2019-03-02 RX ADMIN — HYDROCHLOROTHIAZIDE 12.5 MG: 12.5 TABLET ORAL at 04:09

## 2019-03-02 RX ADMIN — ACETAMINOPHEN 650 MG: 325 TABLET, FILM COATED ORAL at 04:10

## 2019-03-02 RX ADMIN — CEFAZOLIN SODIUM 2 G: 2 INJECTION, SOLUTION INTRAVENOUS at 05:15

## 2019-03-02 ASSESSMENT — COGNITIVE AND FUNCTIONAL STATUS - GENERAL
SUGGESTED CMS G CODE MODIFIER DAILY ACTIVITY: CJ
HELP NEEDED FOR BATHING: A LITTLE
DRESSING REGULAR LOWER BODY CLOTHING: A LITTLE
DRESSING REGULAR UPPER BODY CLOTHING: A LITTLE
DAILY ACTIVITIY SCORE: 21

## 2019-03-02 ASSESSMENT — ACTIVITIES OF DAILY LIVING (ADL): TOILETING: INDEPENDENT

## 2019-03-02 NOTE — CARE PLAN
Problem: Safety  Goal: Will remain free from falls    Intervention: Implement fall precautions  Calls appropriately. Call light and personal belongings within easy reach. Educated on level of risk. Fall precautions in place. Instructed to call for assistance whenever needed.      Problem: Pain Management  Goal: Pain level will decrease to patient's comfort goal    Intervention: Follow pain managment plan developed in collaboration with patient and Interdisciplinary Team  With complaints of pain rated and medicated per MAR. Clustered nursing interventions to promote rest. Hourly rounding in place. Instructed to report if pain worsens or is unrelieved by pain medications.

## 2019-03-02 NOTE — OR SURGEON
Immediate Post OP Note    PreOp Diagnosis: Left comminuted, displaced proximal third humerus shaft fracture    PostOp Diagnosis: same    Procedure(s):  HUMERUS ORIF - Wound Class: Clean    Surgeon(s):  Bandar Block M.D.    Anesthesiologist/Type of Anesthesia:  Anesthesiologist: Douglas Ferrer M.D./General    Surgical Staff:  Circulator: Teofilo Nash R.N.  Scrub Person: Nba Chris R.N.    Specimens removed if any:  * No specimens in log *    Estimated Blood Loss: 200cc    Findings: see dictation    Complications: none known    PLAN:  --readmit postop  --NWB LUE with sling for comfort  --okay for shoulder/elbow ROM as tolerated  --ancef x 2 doses postop  --fu 2 weeks postop        3/1/2019 5:48 PM Bandar Block M.D.

## 2019-03-02 NOTE — THERAPY
"Occupational Therapy Evaluation completed.   Functional Status:  Supervision. Reviewed ROM, dressing tasks, verbalizes understanding  Plan of Care: eval only, DCing to home  Discharge Recommendations:  Equipment: NA Post-acute therapy Recommend home health or outpatient transitional care services for continued occupational therapy services    See \"Rehab Therapy-Acute\" Patient Summary Report for complete documentation.    "

## 2019-03-02 NOTE — PROGRESS NOTES
Patient complains of constant 8/10. Paged Dr. Arias regarding pain medications. With new orders of Oxycodone 10mg PO Q4 PRN.

## 2019-03-02 NOTE — PROGRESS NOTES
2 RN skin check completed with PRIYA Box:  · Devices in place: SCDs  · Skin assessed under devices: yes  · Confirmed pressure ulcers found: none  · Sx dressing to LUE; blanchable redness to sacrum  · The following interventions in place: Emphasized importance of turning self from side to side; Elevated LUE on pillows

## 2019-03-02 NOTE — DISCHARGE SUMMARY
Discharge Summary    CHIEF COMPLAINT ON ADMISSION  Chief Complaint   Patient presents with   • Arm Pain       Reason for Admission  EMS     Admission Date  3/1/2019    CODE STATUS  Full Code    HPI & HOSPITAL COURSE  This is a 48 y.o. female here with recent surgery who was at home recuperating when the patient apparently fell over her granddaughter's toys and landed against the door.  Doing so she hit her left shoulder into the door and afterwards could not move her arm.  She went into the [facility] where she was evaluated and found to have a proximal humeral shaft spiral fracture.  The patient was thus sent to [facility] for evaluation.  The patient underwent at this point fracture repair and has been stabilized.  I spoke with orthopedics who at this point feels the patient is ready to go home and they will follow-up with the orthopedic clinic in about 2 weeks.       Therefore, she is discharged in good and stable condition to home with close outpatient follow-up.    The patient recovered much more quickly than anticipated on admission.    Discharge Date  3/2/2019    FOLLOW UP ITEMS POST DISCHARGE  Follow-up with orthopedics in about 2 weeks  Follow-up with the primary care physician in 1-2 weeks    DISCHARGE DIAGNOSES  Principal Problem:    Fracture of left humerus POA: Yes  Active Problems:    Obesity POA: Yes    Tobacco use POA: Yes    Depression POA: Yes    Alcoholism (HCC) POA: Yes    Hypertension POA: Unknown    Gastroesophageal reflux disease without esophagitis POA: Yes  Resolved Problems:    * No resolved hospital problems. *      FOLLOW UP  No future appointments.  [facility], Emergency Dept  [address] [id number] [phone]        Bandar Block M.D.  [address]  [facility] [address]  [facility] [address]  [phone]      Call ASAP to schedule fu appt for 2 weeks postop      MEDICATIONS ON DISCHARGE     Medication List      CONTINUE taking these  "medications      Instructions   acyclovir 5 % Oint  Commonly known as:  ZOVIRAX   Apply 1 Dose to affected area(s) 5 Times a Day.  Dose:  1 Dose     citalopram 40 MG Tabs  Commonly known as:  CELEXA   Take 40 mg by mouth every day.  Dose:  40 mg     ibuprofen 800 MG Tabs  Commonly known as:  MOTRIN   Take 800 mg by mouth every 8 hours as needed.  Dose:  800 mg     lisinopril-hydrochlorothiazide 20-12.5 MG per tablet  Commonly known as:  PRINZIDE, ZESTORETIC   Take 1 Tab by mouth every morning.  Dose:  1 Tab     medroxyPROGESTERone 10 MG Tabs  Commonly known as:  PROVERA   Take 10 mg by mouth every day.  Dose:  10 mg     omeprazole 20 MG delayed-release capsule  Commonly known as:  PRILOSEC   Take 20 mg by mouth 2 times a day as needed.  Dose:  20 mg     oxyCODONE-acetaminophen 5-325 MG Tabs  Commonly known as:  PERCOCET   Take 1-2 Tabs by mouth every 8 hours as needed for up to 7 days.  Dose:  1-2 Tab            Allergies  Allergies   Allergen Reactions   • Aspirin Vomiting   • Other Misc      Waterproof band aids took off skin and caused infection    • Nsaids Unspecified     Pt states not allergic to ALL nsaids, just \"not supposed to take them after gastric sleeve surgery\"       DIET  Orders Placed This Encounter   Procedures   • Diet Order Regular     Standing Status:   Standing     Number of Occurrences:   1     Order Specific Question:   Diet:     Answer:   Regular [1]       ACTIVITY  As tolerated.  Weight bearing as tolerated    CONSULTATIONS  Orthopedics    PROCEDURES  Open reduction internal fixation of left humeral fracture done on 3/1/2019    LABORATORY  Lab Results   Component Value Date    SODIUM 133 (L) 03/02/2019    POTASSIUM 4.7 03/02/2019    CHLORIDE 103 03/02/2019    CO2 23 03/02/2019    GLUCOSE 120 (H) 03/02/2019    BUN 13 03/02/2019    CREATININE 0.62 03/02/2019    CREATININE 0.8 12/22/2007        Lab Results   Component Value Date    WBC 9.0 03/02/2019    HEMOGLOBIN 11.2 (L) 03/02/2019    " HEMATOCRIT 33.3 (L) 03/02/2019    PLATELETCT 213 03/02/2019        Total time of the discharge process exceeds 35 minutes.

## 2019-03-02 NOTE — OP REPORT
DATE OF SERVICE:  03/01/2019    PREOPERATIVE DIAGNOSIS:  Left comminuted displaced proximal third humerus   shaft fracture.    POSTOPERATIVE DIAGNOSIS:  Left comminuted displaced proximal third humerus   shaft fracture.    PROCEDURE PERFORMED:  Open treatment with internal fixation of left humerus   shaft fracture.    SURGEON:  Bandar Block MD    ANESTHESIOLOGIST:  Douglas Ferrer MD    ANESTHESIA:  General.    ESTIMATED BLOOD LOSS:  200 mL    IMPLANTS:  Synthes 10-hole proximal humerus locking plate with multiple 2.5 mm   locking and nonlocking screws as well as multiple 2.7 mm cortical screws   outside of the plate.    INDICATION FOR PROCEDURE:  Patient is a 48-year-old female.  She had a fall at   her home last evening.  She was seen in an outside facility in [place] and was   transferred to [facility] earlier this morning.  I was consulted to help provide   treatment and recommendations.  Patient was evaluated for admission to the   hospitalist service as when she came in, she was intoxicated, but her acute   alcohol intoxication resolved to the point where I met her, evaluated her and   discussed treatment options.  She expressed good understanding of options   including operative and nonoperative treatment; therefore, she preferred   surgical management, which I actually felt was in her best interest given her   fracture displacement, alignment and felt that given her body habitus, she is   not an ideal candidate for bracing.  She was in agreement, signed informed   consent.  She wished to proceed with surgery as outlined above.    DESCRIPTION OF PROCEDURE:  Patient was met in the preop holding area and her   surgical site was signed and consent was confirmed for accuracy.  She was   taken back to the operating room and general anesthesia was induced.  Ancef   was administered.  The left upper extremity was prepped and draped in the   usual sterile fashion.  A formal timeout was performed to confirm patient's    correct name, correct surgical site, correct procedure and correct laterality.    An extensile deltopectoral approach was performed with a scalpel down   through skin.  Dissection was carried with Bovie cautery down to the cephalic   vein, which was identified, mobilized, and protected.  The plane was developed   at the deltopectoral interval down to the proximal humerus where a portion of   the fracture was identified more distally as with the biceps fascia and there   was a traumatic disruption of the brachialis at midline, which I extended   slightly distally to the level of a muscular  and then more distally   I split the brachialis at the lateral third to get down to the humerus shaft,   clearing the area from soft tissues with Jeffery elevator to protect the radial   nerve, which was seen to be clear of the shaft distally.  After I had   sufficient exposure cleared the fracture site of hematoma and soft tissue, and   I was able to reduce the main butterfly fragment with a combination of   reduction forceps and placed three 2.7 mm lag screws to achieve   interfragmentary compression.  I then reduced the proximal segment to the more   distal segment with reduction forceps and placed 2 more 2.7 mm cortical   screws using lag technique to achieve interfragmentary compression there.    Fluoroscopic imaging confirmed acceptable alignment of the fracture and   acceptable position of the lag screws.  I then slightly contoured and   positioned a 10-hole Synthes proximal humerus locking plate to the appropriate   position, pinned it proximally and distally confirmed that it was on bone and   away from the radial nerve, which was tracking more distally and I fixed it   proximally with a bicortical nonlocking screw distally with bicortical   nonlocking screw, placed several locking screws in the proximal segment,   several more nonlocking screws in the distal segment and obtained final   fluoroscopic imaging,  which confirmed overall acceptable alignment of the   fracture and acceptable position of the implants.  The wounds were thoroughly   irrigated with normal saline.  I reapproximated the biceps fascia with 0   Vicryl, the deltopectoral interval with 0 Vicryl, subQ layers with 0 Vicryl,   2-0 Vicryl, and skin edges with staples.  The wound was thoroughly cleansed   and dried and a sterile dressing was applied.  She was then awoken from   anesthesia and [place] and taken to the postanesthesia care   unit in stable condition.    PLAN:  1.  Patient will be readmitted to the medical service postop.  2.  She should be nonweightbearing to left upper extremity with a sling for   comfort.  3.  She can start shoulder and elbow range of motion as tolerated.  4.  She will need Ancef for 2 doses postop for routine infection prophylaxis.  5.  She can follow up with me in 2 weeks for routine wound check and staple   removal.       ____________________________________     MD TIFFANY Borges / JASMINE    DD:  03/01/2019 17:56:32  DT:  03/01/2019 18:20:17    D#:  [id number]  Job#:  [id number]

## 2019-03-02 NOTE — DIETARY
NUTRITION SERVICES: BMI - Pt with BMI >40 (=Body mass index is 52.13 kg/m².). Weight loss counseling not appropriate in acute care setting.     RECOMMEND - Referral to outpatient nutrition services for weight management after D/C.

## 2019-03-02 NOTE — PROGRESS NOTES
48yoF with left proximal humerus fracture s/p ORIF today.    S: Doing okay in PACU, appears comfortable    O:    Vitals:    03/01/19 1437   BP: 128/59   Pulse: 87   Resp: 14   Temp: 36.6 °C (97.8 °F)   SpO2: 95%     Exam:  General-NAD, alert and following commands  LUE-dressing c/d/i, +AIN/PIN/M/R/U motor, SILT diffusely in hand with palp radial pulse    A: 48yoF with left proximal humerus fracture s/p ORIF 3/1.    Recs:  --readmit postop  --NWB LUE with sling for comfort  --okay for shoulder/elbow ROM as tolerated  --ancef x 2 doses postop  --fu 2 weeks postop

## 2019-03-13 ENCOUNTER — HOSPITAL ENCOUNTER (OUTPATIENT)
Dept: RADIOLOGY | Facility: MEDICAL CENTER | Age: 49
DRG: 621 | End: 2019-03-13
Attending: COLON & RECTAL SURGERY | Admitting: COLON & RECTAL SURGERY
Payer: COMMERCIAL

## 2019-03-13 DIAGNOSIS — Z01.811 PRE-OPERATIVE RESPIRATORY EXAMINATION: ICD-10-CM

## 2019-03-13 DIAGNOSIS — Z01.812 PRE-OPERATIVE LABORATORY EXAMINATION: ICD-10-CM

## 2019-03-13 LAB
ALBUMIN SERPL BCP-MCNC: 3.7 G/DL (ref 3.2–4.9)
ALBUMIN/GLOB SERPL: 1.2 G/DL
ALP SERPL-CCNC: 69 U/L (ref 30–99)
ALT SERPL-CCNC: 20 U/L (ref 2–50)
ANION GAP SERPL CALC-SCNC: 7 MMOL/L (ref 0–11.9)
AST SERPL-CCNC: 16 U/L (ref 12–45)
BASOPHILS # BLD AUTO: 0.4 % (ref 0–1.8)
BASOPHILS # BLD: 0.03 K/UL (ref 0–0.12)
BILIRUB SERPL-MCNC: 0.3 MG/DL (ref 0.1–1.5)
BUN SERPL-MCNC: 9 MG/DL (ref 8–22)
CALCIUM SERPL-MCNC: 8.9 MG/DL (ref 8.5–10.5)
CHLORIDE SERPL-SCNC: 104 MMOL/L (ref 96–112)
CO2 SERPL-SCNC: 28 MMOL/L (ref 20–33)
CREAT SERPL-MCNC: 0.71 MG/DL (ref 0.5–1.4)
EOSINOPHIL # BLD AUTO: 0.08 K/UL (ref 0–0.51)
EOSINOPHIL NFR BLD: 1.2 % (ref 0–6.9)
ERYTHROCYTE [DISTWIDTH] IN BLOOD BY AUTOMATED COUNT: 46.5 FL (ref 35.9–50)
GLOBULIN SER CALC-MCNC: 3 G/DL (ref 1.9–3.5)
GLUCOSE SERPL-MCNC: 83 MG/DL (ref 65–99)
HCT VFR BLD AUTO: 32.6 % (ref 37–47)
HGB BLD-MCNC: 10.5 G/DL (ref 12–16)
IMM GRANULOCYTES # BLD AUTO: 0.01 K/UL (ref 0–0.11)
IMM GRANULOCYTES NFR BLD AUTO: 0.1 % (ref 0–0.9)
INR PPP: 1.05 (ref 0.87–1.13)
LYMPHOCYTES # BLD AUTO: 2.45 K/UL (ref 1–4.8)
LYMPHOCYTES NFR BLD: 36.2 % (ref 22–41)
MCH RBC QN AUTO: 30.6 PG (ref 27–33)
MCHC RBC AUTO-ENTMCNC: 32.2 G/DL (ref 33.6–35)
MCV RBC AUTO: 95 FL (ref 81.4–97.8)
MONOCYTES # BLD AUTO: 0.36 K/UL (ref 0–0.85)
MONOCYTES NFR BLD AUTO: 5.3 % (ref 0–13.4)
NEUTROPHILS # BLD AUTO: 3.83 K/UL (ref 2–7.15)
NEUTROPHILS NFR BLD: 56.8 % (ref 44–72)
NRBC # BLD AUTO: 0 K/UL
NRBC BLD-RTO: 0 /100 WBC
PLATELET # BLD AUTO: 289 K/UL (ref 164–446)
PMV BLD AUTO: 10.8 FL (ref 9–12.9)
POTASSIUM SERPL-SCNC: 3.9 MMOL/L (ref 3.6–5.5)
PROT SERPL-MCNC: 6.7 G/DL (ref 6–8.2)
PROTHROMBIN TIME: 13.8 SEC (ref 12–14.6)
RBC # BLD AUTO: 3.43 M/UL (ref 4.2–5.4)
SODIUM SERPL-SCNC: 139 MMOL/L (ref 135–145)
WBC # BLD AUTO: 6.8 K/UL (ref 4.8–10.8)

## 2019-03-13 PROCEDURE — 36415 COLL VENOUS BLD VENIPUNCTURE: CPT

## 2019-03-13 PROCEDURE — 85610 PROTHROMBIN TIME: CPT

## 2019-03-13 PROCEDURE — 71045 X-RAY EXAM CHEST 1 VIEW: CPT

## 2019-03-13 PROCEDURE — 85025 COMPLETE CBC W/AUTO DIFF WBC: CPT

## 2019-03-13 PROCEDURE — 80053 COMPREHEN METABOLIC PANEL: CPT

## 2019-03-13 NOTE — ADDENDUM NOTE
Encounter addended by: Angelo Klein M.D. on: 3/13/2019  4:31 PM<BR>    Actions taken: Sign clinical note

## 2019-03-13 NOTE — DOCUMENTATION QUERY
[facility]                                                                         Query Response Note      PATIENT:               SINGH DUPONT  ACCT #:                  [id number]  MRN:                       [id number]  :                       1970  ADMIT DATE:       3/1/2019 6:41 AM  DISCH DATE:        3/2/2019 11:10 AM  RESPONDING  PROVIDER #:        [id number]           RESPONSE TEXT:    Unable to determine    QUERY TEXT:    Depression Type [id number]    Depression is documented in the Medical Record.  Please specify the type.    NOTE:  If an appropriate response is not listed below, please respond with a new note.          The patient's Clinical Indicators include:  Patient taking Celexa  Query created by: Alia Medina on 3/12/2019 3:16 AM        Electronically signed by:  ANTONETTE PERES MD 3/13/2019 4:27 PM

## 2019-03-19 RX ORDER — ACETAMINOPHEN 10 MG/ML
1 INJECTION, SOLUTION INTRAVENOUS ONCE
Status: COMPLETED | OUTPATIENT
Start: 2019-03-20 | End: 2019-03-20

## 2019-03-20 ENCOUNTER — HOSPITAL ENCOUNTER (INPATIENT)
Facility: MEDICAL CENTER | Age: 49
LOS: 2 days | DRG: 621 | End: 2019-03-22
Attending: COLON & RECTAL SURGERY | Admitting: COLON & RECTAL SURGERY
Payer: COMMERCIAL

## 2019-03-20 ENCOUNTER — ANESTHESIA (OUTPATIENT)
Dept: SURGERY | Facility: MEDICAL CENTER | Age: 49
DRG: 621 | End: 2019-03-20
Payer: COMMERCIAL

## 2019-03-20 ENCOUNTER — ANESTHESIA EVENT (OUTPATIENT)
Dept: SURGERY | Facility: MEDICAL CENTER | Age: 49
DRG: 621 | End: 2019-03-20
Payer: COMMERCIAL

## 2019-03-20 LAB
ABO GROUP BLD: NORMAL
ABO GROUP BLD: NORMAL
BLD GP AB SCN SERPL QL: NORMAL
HCG UR QL: NEGATIVE
RH BLD: NORMAL
RH BLD: NORMAL
SP GR UR REFRACTOMETRY: <=1.005

## 2019-03-20 PROCEDURE — 700111 HCHG RX REV CODE 636 W/ 250 OVERRIDE (IP): Performed by: COLON & RECTAL SURGERY

## 2019-03-20 PROCEDURE — 0BQT4ZZ REPAIR DIAPHRAGM, PERCUTANEOUS ENDOSCOPIC APPROACH: ICD-10-PCS | Performed by: COLON & RECTAL SURGERY

## 2019-03-20 PROCEDURE — 501574 HCHG TROCAR, SMTH CAN&SEAL 5: Performed by: COLON & RECTAL SURGERY

## 2019-03-20 PROCEDURE — 160002 HCHG RECOVERY MINUTES (STAT): Performed by: COLON & RECTAL SURGERY

## 2019-03-20 PROCEDURE — 700101 HCHG RX REV CODE 250

## 2019-03-20 PROCEDURE — 502570 HCHG PACK, GASTRIC BANDING: Performed by: COLON & RECTAL SURGERY

## 2019-03-20 PROCEDURE — 501463 HCHG STAPLES, UNIV. ROTIC: Performed by: COLON & RECTAL SURGERY

## 2019-03-20 PROCEDURE — 502652 HCHG STAPLES, ETHICON ECR60: Performed by: COLON & RECTAL SURGERY

## 2019-03-20 PROCEDURE — 160036 HCHG PACU - EA ADDL 30 MINS PHASE I: Performed by: COLON & RECTAL SURGERY

## 2019-03-20 PROCEDURE — 86901 BLOOD TYPING SEROLOGIC RH(D): CPT

## 2019-03-20 PROCEDURE — 0DNH4ZZ RELEASE CECUM, PERCUTANEOUS ENDOSCOPIC APPROACH: ICD-10-PCS | Performed by: COLON & RECTAL SURGERY

## 2019-03-20 PROCEDURE — 160041 HCHG SURGERY MINUTES - EA ADDL 1 MIN LEVEL 4: Performed by: COLON & RECTAL SURGERY

## 2019-03-20 PROCEDURE — 501583 HCHG TROCAR, THRD CAN&SEAL 5X100: Performed by: COLON & RECTAL SURGERY

## 2019-03-20 PROCEDURE — 770006 HCHG ROOM/CARE - MED/SURG/GYN SEMI*

## 2019-03-20 PROCEDURE — 500380 HCHG DRAIN, PENROSE 1/4X12: Performed by: COLON & RECTAL SURGERY

## 2019-03-20 PROCEDURE — 500448 HCHG DRESSING, TELFA 3X4: Performed by: COLON & RECTAL SURGERY

## 2019-03-20 PROCEDURE — 700111 HCHG RX REV CODE 636 W/ 250 OVERRIDE (IP): Performed by: ANESTHESIOLOGY

## 2019-03-20 PROCEDURE — 160048 HCHG OR STATISTICAL LEVEL 1-5: Performed by: COLON & RECTAL SURGERY

## 2019-03-20 PROCEDURE — 500522 HCHG ENDOSTITCH SUTURING DEVICE: Performed by: COLON & RECTAL SURGERY

## 2019-03-20 PROCEDURE — A9270 NON-COVERED ITEM OR SERVICE: HCPCS | Performed by: COLON & RECTAL SURGERY

## 2019-03-20 PROCEDURE — 501838 HCHG SUTURE GENERAL: Performed by: COLON & RECTAL SURGERY

## 2019-03-20 PROCEDURE — 700101 HCHG RX REV CODE 250: Performed by: ANESTHESIOLOGY

## 2019-03-20 PROCEDURE — A9270 NON-COVERED ITEM OR SERVICE: HCPCS | Performed by: NURSE PRACTITIONER

## 2019-03-20 PROCEDURE — 501576 HCHG TROCAR, SMTH CAN&SEAL12: Performed by: COLON & RECTAL SURGERY

## 2019-03-20 PROCEDURE — 0DNB4ZZ RELEASE ILEUM, PERCUTANEOUS ENDOSCOPIC APPROACH: ICD-10-PCS | Performed by: COLON & RECTAL SURGERY

## 2019-03-20 PROCEDURE — 501571 HCHG TROCAR, SEPARATOR 12X100: Performed by: COLON & RECTAL SURGERY

## 2019-03-20 PROCEDURE — 700102 HCHG RX REV CODE 250 W/ 637 OVERRIDE(OP): Performed by: NURSE PRACTITIONER

## 2019-03-20 PROCEDURE — 501570 HCHG TROCAR, SEPARATOR: Performed by: COLON & RECTAL SURGERY

## 2019-03-20 PROCEDURE — 700111 HCHG RX REV CODE 636 W/ 250 OVERRIDE (IP)

## 2019-03-20 PROCEDURE — 500521 HCHG ENDOSTITCH LOAD UNIT: Performed by: COLON & RECTAL SURGERY

## 2019-03-20 PROCEDURE — 0D194ZB BYPASS DUODENUM TO ILEUM, PERCUTANEOUS ENDOSCOPIC APPROACH: ICD-10-PCS | Performed by: COLON & RECTAL SURGERY

## 2019-03-20 PROCEDURE — 500800 HCHG LAPAROSCOPIC J/L HOOK: Performed by: COLON & RECTAL SURGERY

## 2019-03-20 PROCEDURE — 700102 HCHG RX REV CODE 250 W/ 637 OVERRIDE(OP): Performed by: COLON & RECTAL SURGERY

## 2019-03-20 PROCEDURE — 81025 URINE PREGNANCY TEST: CPT

## 2019-03-20 PROCEDURE — 160009 HCHG ANES TIME/MIN: Performed by: COLON & RECTAL SURGERY

## 2019-03-20 PROCEDURE — 160029 HCHG SURGERY MINUTES - 1ST 30 MINS LEVEL 4: Performed by: COLON & RECTAL SURGERY

## 2019-03-20 PROCEDURE — 86900 BLOOD TYPING SEROLOGIC ABO: CPT

## 2019-03-20 PROCEDURE — 501445 HCHG STAPLER, SKIN DISP: Performed by: COLON & RECTAL SURGERY

## 2019-03-20 PROCEDURE — 160035 HCHG PACU - 1ST 60 MINS PHASE I: Performed by: COLON & RECTAL SURGERY

## 2019-03-20 PROCEDURE — 86850 RBC ANTIBODY SCREEN: CPT

## 2019-03-20 PROCEDURE — 700111 HCHG RX REV CODE 636 W/ 250 OVERRIDE (IP): Performed by: NURSE PRACTITIONER

## 2019-03-20 RX ORDER — LISINOPRIL AND HYDROCHLOROTHIAZIDE 20; 12.5 MG/1; MG/1
1 TABLET ORAL EVERY MORNING
Status: DISCONTINUED | OUTPATIENT
Start: 2019-03-21 | End: 2019-03-20

## 2019-03-20 RX ORDER — SODIUM CHLORIDE, SODIUM LACTATE, POTASSIUM CHLORIDE, CALCIUM CHLORIDE 600; 310; 30; 20 MG/100ML; MG/100ML; MG/100ML; MG/100ML
INJECTION, SOLUTION INTRAVENOUS CONTINUOUS
Status: DISCONTINUED | OUTPATIENT
Start: 2019-03-20 | End: 2019-03-20 | Stop reason: HOSPADM

## 2019-03-20 RX ORDER — DEXAMETHASONE SODIUM PHOSPHATE 4 MG/ML
INJECTION, SOLUTION INTRA-ARTICULAR; INTRALESIONAL; INTRAMUSCULAR; INTRAVENOUS; SOFT TISSUE PRN
Status: DISCONTINUED | OUTPATIENT
Start: 2019-03-20 | End: 2019-03-20 | Stop reason: SURG

## 2019-03-20 RX ORDER — ACETAMINOPHEN 500 MG
1000 TABLET ORAL EVERY 6 HOURS
Status: DISCONTINUED | OUTPATIENT
Start: 2019-03-21 | End: 2019-03-22 | Stop reason: HOSPADM

## 2019-03-20 RX ORDER — CITALOPRAM 40 MG/1
40 TABLET ORAL DAILY
Status: DISCONTINUED | OUTPATIENT
Start: 2019-03-21 | End: 2019-03-22 | Stop reason: HOSPADM

## 2019-03-20 RX ORDER — SODIUM CHLORIDE, SODIUM LACTATE, POTASSIUM CHLORIDE, AND CALCIUM CHLORIDE .6; .31; .03; .02 G/100ML; G/100ML; G/100ML; G/100ML
500 INJECTION, SOLUTION INTRAVENOUS
Status: DISCONTINUED | OUTPATIENT
Start: 2019-03-20 | End: 2019-03-22 | Stop reason: HOSPADM

## 2019-03-20 RX ORDER — HYDROMORPHONE HYDROCHLORIDE 1 MG/ML
0.4 INJECTION, SOLUTION INTRAMUSCULAR; INTRAVENOUS; SUBCUTANEOUS
Status: DISCONTINUED | OUTPATIENT
Start: 2019-03-20 | End: 2019-03-20 | Stop reason: HOSPADM

## 2019-03-20 RX ORDER — HALOPERIDOL 5 MG/ML
1 INJECTION INTRAMUSCULAR EVERY 6 HOURS PRN
Status: DISCONTINUED | OUTPATIENT
Start: 2019-03-20 | End: 2019-03-22 | Stop reason: HOSPADM

## 2019-03-20 RX ORDER — PROMETHAZINE HYDROCHLORIDE 25 MG/1
25 SUPPOSITORY RECTAL EVERY 4 HOURS PRN
Status: DISCONTINUED | OUTPATIENT
Start: 2019-03-20 | End: 2019-03-22 | Stop reason: HOSPADM

## 2019-03-20 RX ORDER — HALOPERIDOL 5 MG/ML
1 INJECTION INTRAMUSCULAR
Status: DISCONTINUED | OUTPATIENT
Start: 2019-03-20 | End: 2019-03-20 | Stop reason: HOSPADM

## 2019-03-20 RX ORDER — ACETAMINOPHEN 10 MG/ML
1000 INJECTION, SOLUTION INTRAVENOUS EVERY 6 HOURS
Status: COMPLETED | OUTPATIENT
Start: 2019-03-20 | End: 2019-03-21

## 2019-03-20 RX ORDER — DIPHENHYDRAMINE HYDROCHLORIDE 50 MG/ML
12.5 INJECTION INTRAMUSCULAR; INTRAVENOUS
Status: DISCONTINUED | OUTPATIENT
Start: 2019-03-20 | End: 2019-03-20 | Stop reason: HOSPADM

## 2019-03-20 RX ORDER — HYDRALAZINE HYDROCHLORIDE 20 MG/ML
20 INJECTION INTRAMUSCULAR; INTRAVENOUS EVERY 6 HOURS PRN
Status: DISCONTINUED | OUTPATIENT
Start: 2019-03-20 | End: 2019-03-22 | Stop reason: HOSPADM

## 2019-03-20 RX ORDER — HYDROMORPHONE HYDROCHLORIDE 1 MG/ML
0.5 INJECTION, SOLUTION INTRAMUSCULAR; INTRAVENOUS; SUBCUTANEOUS
Status: DISCONTINUED | OUTPATIENT
Start: 2019-03-20 | End: 2019-03-22 | Stop reason: HOSPADM

## 2019-03-20 RX ORDER — LORAZEPAM 2 MG/ML
0.5 INJECTION INTRAMUSCULAR EVERY 4 HOURS PRN
Status: DISCONTINUED | OUTPATIENT
Start: 2019-03-20 | End: 2019-03-22 | Stop reason: HOSPADM

## 2019-03-20 RX ORDER — SENNOSIDES 8.6 MG
1300 CAPSULE ORAL EVERY 6 HOURS PRN
COMMUNITY
End: 2020-04-25

## 2019-03-20 RX ORDER — HYDROMORPHONE HYDROCHLORIDE 1 MG/ML
0.1 INJECTION, SOLUTION INTRAMUSCULAR; INTRAVENOUS; SUBCUTANEOUS
Status: DISCONTINUED | OUTPATIENT
Start: 2019-03-20 | End: 2019-03-20 | Stop reason: HOSPADM

## 2019-03-20 RX ORDER — GABAPENTIN 300 MG/1
300 CAPSULE ORAL ONCE
Status: COMPLETED | OUTPATIENT
Start: 2019-03-20 | End: 2019-03-20

## 2019-03-20 RX ORDER — OXYCODONE HCL 5 MG/5 ML
5 SOLUTION, ORAL ORAL
Status: DISCONTINUED | OUTPATIENT
Start: 2019-03-20 | End: 2019-03-22 | Stop reason: HOSPADM

## 2019-03-20 RX ORDER — OXYCODONE HCL 5 MG/5 ML
10 SOLUTION, ORAL ORAL
Status: DISCONTINUED | OUTPATIENT
Start: 2019-03-20 | End: 2019-03-20 | Stop reason: HOSPADM

## 2019-03-20 RX ORDER — ONDANSETRON 2 MG/ML
4 INJECTION INTRAMUSCULAR; INTRAVENOUS EVERY 6 HOURS
Status: DISCONTINUED | OUTPATIENT
Start: 2019-03-20 | End: 2019-03-22 | Stop reason: HOSPADM

## 2019-03-20 RX ORDER — SCOLOPAMINE TRANSDERMAL SYSTEM 1 MG/1
1 PATCH, EXTENDED RELEASE TRANSDERMAL
Status: DISCONTINUED | OUTPATIENT
Start: 2019-03-20 | End: 2019-03-22 | Stop reason: HOSPADM

## 2019-03-20 RX ORDER — CEFOXITIN 2 G/1
INJECTION, POWDER, FOR SOLUTION INTRAVENOUS PRN
Status: DISCONTINUED | OUTPATIENT
Start: 2019-03-20 | End: 2019-03-20 | Stop reason: SURG

## 2019-03-20 RX ORDER — HYDROMORPHONE HYDROCHLORIDE 1 MG/ML
0.2 INJECTION, SOLUTION INTRAMUSCULAR; INTRAVENOUS; SUBCUTANEOUS
Status: DISCONTINUED | OUTPATIENT
Start: 2019-03-20 | End: 2019-03-20 | Stop reason: HOSPADM

## 2019-03-20 RX ORDER — HYDROCODONE BITARTRATE AND ACETAMINOPHEN 5; 325 MG/1; MG/1
.5-1 TABLET ORAL EVERY 6 HOURS PRN
COMMUNITY
End: 2020-04-25

## 2019-03-20 RX ORDER — CALCIUM CARBONATE 500 MG/1
500 TABLET, CHEWABLE ORAL
Status: DISCONTINUED | OUTPATIENT
Start: 2019-03-20 | End: 2019-03-22 | Stop reason: HOSPADM

## 2019-03-20 RX ORDER — OXYCODONE HCL 5 MG/5 ML
10 SOLUTION, ORAL ORAL
Status: DISCONTINUED | OUTPATIENT
Start: 2019-03-20 | End: 2019-03-22 | Stop reason: HOSPADM

## 2019-03-20 RX ORDER — HYDROCHLOROTHIAZIDE 12.5 MG/1
12.5 TABLET ORAL
Status: DISCONTINUED | OUTPATIENT
Start: 2019-03-21 | End: 2019-03-22 | Stop reason: HOSPADM

## 2019-03-20 RX ORDER — MEPERIDINE HYDROCHLORIDE 25 MG/ML
12.5 INJECTION INTRAMUSCULAR; INTRAVENOUS; SUBCUTANEOUS
Status: DISCONTINUED | OUTPATIENT
Start: 2019-03-20 | End: 2019-03-20 | Stop reason: HOSPADM

## 2019-03-20 RX ORDER — ENALAPRILAT 1.25 MG/ML
2.5 INJECTION INTRAVENOUS EVERY 6 HOURS PRN
Status: DISCONTINUED | OUTPATIENT
Start: 2019-03-20 | End: 2019-03-22 | Stop reason: HOSPADM

## 2019-03-20 RX ORDER — BUPIVACAINE HYDROCHLORIDE AND EPINEPHRINE 5; 5 MG/ML; UG/ML
INJECTION, SOLUTION EPIDURAL; INTRACAUDAL; PERINEURAL
Status: DISCONTINUED | OUTPATIENT
Start: 2019-03-20 | End: 2019-03-20 | Stop reason: HOSPADM

## 2019-03-20 RX ORDER — LISINOPRIL 20 MG/1
20 TABLET ORAL
Status: DISCONTINUED | OUTPATIENT
Start: 2019-03-21 | End: 2019-03-22 | Stop reason: HOSPADM

## 2019-03-20 RX ORDER — LIDOCAINE HYDROCHLORIDE 10 MG/ML
INJECTION, SOLUTION INFILTRATION; PERINEURAL
Status: COMPLETED
Start: 2019-03-20 | End: 2019-03-20

## 2019-03-20 RX ORDER — ALBUTEROL SULFATE 90 UG/1
2 AEROSOL, METERED RESPIRATORY (INHALATION) EVERY 4 HOURS PRN
Status: DISCONTINUED | OUTPATIENT
Start: 2019-03-20 | End: 2019-03-22 | Stop reason: HOSPADM

## 2019-03-20 RX ORDER — GABAPENTIN 300 MG/1
300 CAPSULE ORAL 3 TIMES DAILY
Status: DISCONTINUED | OUTPATIENT
Start: 2019-03-20 | End: 2019-03-22 | Stop reason: HOSPADM

## 2019-03-20 RX ORDER — SODIUM CHLORIDE, SODIUM LACTATE, POTASSIUM CHLORIDE, CALCIUM CHLORIDE 600; 310; 30; 20 MG/100ML; MG/100ML; MG/100ML; MG/100ML
INJECTION, SOLUTION INTRAVENOUS CONTINUOUS
Status: ACTIVE | OUTPATIENT
Start: 2019-03-20 | End: 2019-03-21

## 2019-03-20 RX ORDER — SIMETHICONE 80 MG
80 TABLET,CHEWABLE ORAL 3 TIMES DAILY PRN
Status: DISCONTINUED | OUTPATIENT
Start: 2019-03-20 | End: 2019-03-22 | Stop reason: HOSPADM

## 2019-03-20 RX ORDER — DIPHENHYDRAMINE HYDROCHLORIDE 50 MG/ML
12.5 INJECTION INTRAMUSCULAR; INTRAVENOUS EVERY 6 HOURS PRN
Status: DISCONTINUED | OUTPATIENT
Start: 2019-03-20 | End: 2019-03-22 | Stop reason: HOSPADM

## 2019-03-20 RX ORDER — OXYCODONE HCL 10 MG/1
10 TABLET, FILM COATED, EXTENDED RELEASE ORAL ONCE
Status: COMPLETED | OUTPATIENT
Start: 2019-03-20 | End: 2019-03-20

## 2019-03-20 RX ORDER — TRAMADOL HYDROCHLORIDE 50 MG/1
50 TABLET ORAL EVERY 8 HOURS PRN
COMMUNITY
End: 2020-04-25

## 2019-03-20 RX ORDER — ONDANSETRON 2 MG/ML
4 INJECTION INTRAMUSCULAR; INTRAVENOUS
Status: DISCONTINUED | OUTPATIENT
Start: 2019-03-20 | End: 2019-03-20 | Stop reason: HOSPADM

## 2019-03-20 RX ORDER — ONDANSETRON 2 MG/ML
INJECTION INTRAMUSCULAR; INTRAVENOUS PRN
Status: DISCONTINUED | OUTPATIENT
Start: 2019-03-20 | End: 2019-03-20 | Stop reason: SURG

## 2019-03-20 RX ORDER — HYDROMORPHONE HYDROCHLORIDE 2 MG/ML
INJECTION, SOLUTION INTRAMUSCULAR; INTRAVENOUS; SUBCUTANEOUS PRN
Status: DISCONTINUED | OUTPATIENT
Start: 2019-03-20 | End: 2019-03-20 | Stop reason: SURG

## 2019-03-20 RX ORDER — OXYCODONE HCL 5 MG/5 ML
5 SOLUTION, ORAL ORAL
Status: DISCONTINUED | OUTPATIENT
Start: 2019-03-20 | End: 2019-03-20 | Stop reason: HOSPADM

## 2019-03-20 RX ADMIN — HYDROMORPHONE HYDROCHLORIDE 0.4 MG: 1 INJECTION, SOLUTION INTRAMUSCULAR; INTRAVENOUS; SUBCUTANEOUS at 18:15

## 2019-03-20 RX ADMIN — FAMOTIDINE 20 MG: 10 INJECTION INTRAVENOUS at 20:31

## 2019-03-20 RX ADMIN — ONDANSETRON 4 MG: 2 INJECTION INTRAMUSCULAR; INTRAVENOUS at 20:31

## 2019-03-20 RX ADMIN — SCOPOLAMINE 1 PATCH: 1 PATCH, EXTENDED RELEASE TRANSDERMAL at 14:07

## 2019-03-20 RX ADMIN — ROCURONIUM BROMIDE 40 MG: 10 INJECTION, SOLUTION INTRAVENOUS at 16:07

## 2019-03-20 RX ADMIN — OXYCODONE HYDROCHLORIDE 10 MG: 10 TABLET, FILM COATED, EXTENDED RELEASE ORAL at 14:07

## 2019-03-20 RX ADMIN — SUGAMMADEX 200 MG: 100 INJECTION, SOLUTION INTRAVENOUS at 17:19

## 2019-03-20 RX ADMIN — ROCURONIUM BROMIDE 10 MG: 10 INJECTION, SOLUTION INTRAVENOUS at 16:25

## 2019-03-20 RX ADMIN — SODIUM CHLORIDE, SODIUM LACTATE, POTASSIUM CHLORIDE, CALCIUM CHLORIDE: 600; 310; 30; 20 INJECTION, SOLUTION INTRAVENOUS at 14:21

## 2019-03-20 RX ADMIN — HYDROMORPHONE HYDROCHLORIDE 0.4 MG: 1 INJECTION, SOLUTION INTRAMUSCULAR; INTRAVENOUS; SUBCUTANEOUS at 18:02

## 2019-03-20 RX ADMIN — Medication 0.5 ML: at 14:07

## 2019-03-20 RX ADMIN — FENTANYL CITRATE 50 MCG: 50 INJECTION, SOLUTION INTRAMUSCULAR; INTRAVENOUS at 17:22

## 2019-03-20 RX ADMIN — LIDOCAINE HYDROCHLORIDE 0.5 ML: 10 INJECTION, SOLUTION INFILTRATION; PERINEURAL at 14:07

## 2019-03-20 RX ADMIN — ROCURONIUM BROMIDE 10 MG: 10 INJECTION, SOLUTION INTRAVENOUS at 17:05

## 2019-03-20 RX ADMIN — ACETAMINOPHEN 1000 MG: 10 INJECTION, SOLUTION INTRAVENOUS at 20:27

## 2019-03-20 RX ADMIN — MIDAZOLAM HYDROCHLORIDE 2 MG: 1 INJECTION, SOLUTION INTRAMUSCULAR; INTRAVENOUS at 16:03

## 2019-03-20 RX ADMIN — SODIUM CHLORIDE, SODIUM LACTATE, POTASSIUM CHLORIDE, CALCIUM CHLORIDE: 600; 310; 30; 20 INJECTION, SOLUTION INTRAVENOUS at 16:03

## 2019-03-20 RX ADMIN — FENTANYL CITRATE 50 MCG: 50 INJECTION, SOLUTION INTRAMUSCULAR; INTRAVENOUS at 16:20

## 2019-03-20 RX ADMIN — ONDANSETRON 4 MG: 2 INJECTION INTRAMUSCULAR; INTRAVENOUS at 17:15

## 2019-03-20 RX ADMIN — GABAPENTIN 300 MG: 300 CAPSULE ORAL at 20:31

## 2019-03-20 RX ADMIN — FENTANYL CITRATE 100 MCG: 50 INJECTION, SOLUTION INTRAMUSCULAR; INTRAVENOUS at 16:07

## 2019-03-20 RX ADMIN — ROCURONIUM BROMIDE 10 MG: 10 INJECTION, SOLUTION INTRAVENOUS at 16:41

## 2019-03-20 RX ADMIN — SODIUM CHLORIDE, SODIUM LACTATE, POTASSIUM CHLORIDE, CALCIUM CHLORIDE: 600; 310; 30; 20 INJECTION, SOLUTION INTRAVENOUS at 16:55

## 2019-03-20 RX ADMIN — CEFOXITIN 2 G: 2 INJECTION, POWDER, FOR SOLUTION INTRAVENOUS at 16:10

## 2019-03-20 RX ADMIN — LIDOCAINE HYDROCHLORIDE 40 MG: 20 INJECTION, SOLUTION INFILTRATION; PERINEURAL at 16:07

## 2019-03-20 RX ADMIN — GABAPENTIN 300 MG: 300 CAPSULE ORAL at 14:07

## 2019-03-20 RX ADMIN — PROPOFOL 140 MG: 10 INJECTION, EMULSION INTRAVENOUS at 16:07

## 2019-03-20 RX ADMIN — DEXAMETHASONE SODIUM PHOSPHATE 8 MG: 4 INJECTION, SOLUTION INTRAMUSCULAR; INTRAVENOUS at 16:20

## 2019-03-20 RX ADMIN — FENTANYL CITRATE 50 MCG: 50 INJECTION, SOLUTION INTRAMUSCULAR; INTRAVENOUS at 17:08

## 2019-03-20 RX ADMIN — HYDROMORPHONE HYDROCHLORIDE 1 MG: 2 INJECTION, SOLUTION INTRAMUSCULAR; INTRAVENOUS; SUBCUTANEOUS at 17:23

## 2019-03-20 RX ADMIN — HYDROMORPHONE HYDROCHLORIDE 1 MG: 2 INJECTION, SOLUTION INTRAMUSCULAR; INTRAVENOUS; SUBCUTANEOUS at 17:30

## 2019-03-20 RX ADMIN — HYDROMORPHONE HYDROCHLORIDE 1 MG: 2 INJECTION, SOLUTION INTRAMUSCULAR; INTRAVENOUS; SUBCUTANEOUS at 17:21

## 2019-03-20 RX ADMIN — HYDROMORPHONE HYDROCHLORIDE 1 MG: 2 INJECTION, SOLUTION INTRAMUSCULAR; INTRAVENOUS; SUBCUTANEOUS at 17:19

## 2019-03-20 RX ADMIN — ACETAMINOPHEN 1 G: 10 INJECTION, SOLUTION INTRAVENOUS at 14:21

## 2019-03-20 RX ADMIN — HYDROMORPHONE HYDROCHLORIDE 0.5 MG: 1 INJECTION, SOLUTION INTRAMUSCULAR; INTRAVENOUS; SUBCUTANEOUS at 20:32

## 2019-03-20 ASSESSMENT — COPD QUESTIONNAIRES
HAVE YOU SMOKED AT LEAST 100 CIGARETTES IN YOUR ENTIRE LIFE: YES
DO YOU EVER COUGH UP ANY MUCUS OR PHLEGM?: NO/ONLY WITH OCCASIONAL COLDS OR INFECTIONS
DURING THE PAST 4 WEEKS HOW MUCH DID YOU FEEL SHORT OF BREATH: NONE/LITTLE OF THE TIME

## 2019-03-20 ASSESSMENT — PATIENT HEALTH QUESTIONNAIRE - PHQ9
SUM OF ALL RESPONSES TO PHQ9 QUESTIONS 1 AND 2: 0
2. FEELING DOWN, DEPRESSED, IRRITABLE, OR HOPELESS: NOT AT ALL
1. LITTLE INTEREST OR PLEASURE IN DOING THINGS: NOT AT ALL

## 2019-03-20 ASSESSMENT — LIFESTYLE VARIABLES
ALCOHOL_USE: NO
EVER_SMOKED: YES
EVER_SMOKED: YES

## 2019-03-20 ASSESSMENT — PAIN SCALES - GENERAL: PAIN_LEVEL: 5

## 2019-03-20 NOTE — PROGRESS NOTES
Med rec updated and complete  Allergies reviewed  Interviewed pt with friend at bedside with permission from pt.  Pt reports no antibiotics in the last 30 days, that she had to take at home.

## 2019-03-20 NOTE — ANESTHESIA PROCEDURE NOTES
Airway  Date/Time: 3/20/2019 4:10 PM  Performed by: REMA CODY  Authorized by: REMA CODY     Location:  OR  Urgency:  Elective  Difficult Airway: No    Indications for Airway Management:  Anesthesia  Spontaneous Ventilation: absent    Sedation Level:  Deep  Preoxygenated: Yes    Patient Position:  Sniffing  Mask Difficulty Assessment:  1 - vent by mask  Final Airway Type:  Endotracheal airway  Final Endotracheal Airway:  ETT  Cuffed: Yes    Technique Used for Successful ETT Placement:  Direct laryngoscopy  Insertion Site:  Oral  Blade Type:  Delbert  Laryngoscope Blade/Videolaryngoscope Blade Size:  3  ETT Size (mm):  7.0  Measured from:  Teeth  ETT to Teeth (cm):  24  Placement Verified by: auscultation and capnometry    Cormack-Lehane Classification:  Grade IIa - partial view of glottis  Number of Attempts at Approach:  1

## 2019-03-20 NOTE — ANESTHESIA PREPROCEDURE EVALUATION
Relevant Problems   (+) Gastroesophageal reflux disease without esophagitis   (+) Hypertension       Physical Exam    Airway   Mallampati: II  TM distance: >3 FB  Neck ROM: full       Cardiovascular - normal exam  Rhythm: regular  Rate: normal  (-) murmur     Dental - normal exam  (+) lower dentures         Pulmonary - normal exam  Breath sounds clear to auscultation     Abdominal    Neurological - normal exam                 Anesthesia Plan    ASA 3   ASA physical status 3 criteria: morbid obesity - BMI greater than or equal to 40 and hypertension - poorly controlled    Plan - general       Airway plan will be ETT        Induction: intravenous    Postoperative Plan: Postoperative administration of opioids is intended.    Pertinent diagnostic labs and testing reviewed    Informed Consent:    Anesthetic plan and risks discussed with patient.    Use of blood products discussed with: patient whom consented to blood products.

## 2019-03-21 LAB
ALBUMIN SERPL BCP-MCNC: 3.8 G/DL (ref 3.2–4.9)
ALBUMIN/GLOB SERPL: 1.6 G/DL
ALP SERPL-CCNC: 67 U/L (ref 30–99)
ALT SERPL-CCNC: 19 U/L (ref 2–50)
ANION GAP SERPL CALC-SCNC: 10 MMOL/L (ref 0–11.9)
AST SERPL-CCNC: 21 U/L (ref 12–45)
BILIRUB SERPL-MCNC: 0.3 MG/DL (ref 0.1–1.5)
BUN SERPL-MCNC: 8 MG/DL (ref 8–22)
CALCIUM SERPL-MCNC: 9 MG/DL (ref 8.5–10.5)
CHLORIDE SERPL-SCNC: 102 MMOL/L (ref 96–112)
CO2 SERPL-SCNC: 24 MMOL/L (ref 20–33)
CREAT SERPL-MCNC: 0.64 MG/DL (ref 0.5–1.4)
ERYTHROCYTE [DISTWIDTH] IN BLOOD BY AUTOMATED COUNT: 44.3 FL (ref 35.9–50)
GLOBULIN SER CALC-MCNC: 2.4 G/DL (ref 1.9–3.5)
GLUCOSE SERPL-MCNC: 148 MG/DL (ref 65–99)
HCT VFR BLD AUTO: 35.6 % (ref 37–47)
HGB BLD-MCNC: 11.6 G/DL (ref 12–16)
MCH RBC QN AUTO: 30.7 PG (ref 27–33)
MCHC RBC AUTO-ENTMCNC: 32.6 G/DL (ref 33.6–35)
MCV RBC AUTO: 94.2 FL (ref 81.4–97.8)
PLATELET # BLD AUTO: 256 K/UL (ref 164–446)
PMV BLD AUTO: 10.7 FL (ref 9–12.9)
POTASSIUM SERPL-SCNC: 4.8 MMOL/L (ref 3.6–5.5)
PROT SERPL-MCNC: 6.2 G/DL (ref 6–8.2)
RBC # BLD AUTO: 3.78 M/UL (ref 4.2–5.4)
SODIUM SERPL-SCNC: 136 MMOL/L (ref 135–145)
WBC # BLD AUTO: 7.2 K/UL (ref 4.8–10.8)

## 2019-03-21 PROCEDURE — 700111 HCHG RX REV CODE 636 W/ 250 OVERRIDE (IP): Performed by: NURSE PRACTITIONER

## 2019-03-21 PROCEDURE — 700111 HCHG RX REV CODE 636 W/ 250 OVERRIDE (IP): Performed by: PHYSICIAN ASSISTANT

## 2019-03-21 PROCEDURE — 770006 HCHG ROOM/CARE - MED/SURG/GYN SEMI*

## 2019-03-21 PROCEDURE — 36415 COLL VENOUS BLD VENIPUNCTURE: CPT

## 2019-03-21 PROCEDURE — 700105 HCHG RX REV CODE 258: Performed by: NURSE PRACTITIONER

## 2019-03-21 PROCEDURE — 85027 COMPLETE CBC AUTOMATED: CPT

## 2019-03-21 PROCEDURE — 700105 HCHG RX REV CODE 258: Performed by: PHYSICIAN ASSISTANT

## 2019-03-21 PROCEDURE — 80053 COMPREHEN METABOLIC PANEL: CPT

## 2019-03-21 PROCEDURE — A9270 NON-COVERED ITEM OR SERVICE: HCPCS | Performed by: NURSE PRACTITIONER

## 2019-03-21 PROCEDURE — 700102 HCHG RX REV CODE 250 W/ 637 OVERRIDE(OP): Performed by: NURSE PRACTITIONER

## 2019-03-21 RX ORDER — FAMOTIDINE 20 MG/1
20 TABLET, FILM COATED ORAL 2 TIMES DAILY
Status: DISCONTINUED | OUTPATIENT
Start: 2019-03-21 | End: 2019-03-22 | Stop reason: HOSPADM

## 2019-03-21 RX ADMIN — FAMOTIDINE 20 MG: 10 INJECTION INTRAVENOUS at 05:22

## 2019-03-21 RX ADMIN — LORAZEPAM 0.5 MG: 2 INJECTION INTRAMUSCULAR; INTRAVENOUS at 00:03

## 2019-03-21 RX ADMIN — FAMOTIDINE 20 MG: 20 TABLET ORAL at 18:03

## 2019-03-21 RX ADMIN — OXYCODONE HYDROCHLORIDE 10 MG: 5 SOLUTION ORAL at 04:31

## 2019-03-21 RX ADMIN — ONDANSETRON 4 MG: 2 INJECTION INTRAMUSCULAR; INTRAVENOUS at 05:22

## 2019-03-21 RX ADMIN — GABAPENTIN 300 MG: 300 CAPSULE ORAL at 18:03

## 2019-03-21 RX ADMIN — HYDROMORPHONE HYDROCHLORIDE 0.5 MG: 1 INJECTION, SOLUTION INTRAMUSCULAR; INTRAVENOUS; SUBCUTANEOUS at 00:02

## 2019-03-21 RX ADMIN — POTASSIUM CHLORIDE: 2 INJECTION, SOLUTION, CONCENTRATE INTRAVENOUS at 00:04

## 2019-03-21 RX ADMIN — HYDROCHLOROTHIAZIDE 12.5 MG: 12.5 TABLET ORAL at 05:22

## 2019-03-21 RX ADMIN — ENOXAPARIN SODIUM 40 MG: 100 INJECTION SUBCUTANEOUS at 08:10

## 2019-03-21 RX ADMIN — GABAPENTIN 300 MG: 300 CAPSULE ORAL at 05:22

## 2019-03-21 RX ADMIN — POTASSIUM CHLORIDE: 2 INJECTION, SOLUTION, CONCENTRATE INTRAVENOUS at 16:15

## 2019-03-21 RX ADMIN — LISINOPRIL 20 MG: 20 TABLET ORAL at 05:22

## 2019-03-21 RX ADMIN — OXYCODONE HYDROCHLORIDE 10 MG: 5 SOLUTION ORAL at 10:07

## 2019-03-21 RX ADMIN — ONDANSETRON 4 MG: 2 INJECTION INTRAMUSCULAR; INTRAVENOUS at 12:15

## 2019-03-21 RX ADMIN — OXYCODONE HYDROCHLORIDE 10 MG: 5 SOLUTION ORAL at 21:12

## 2019-03-21 RX ADMIN — POTASSIUM CHLORIDE: 2 INJECTION, SOLUTION, CONCENTRATE INTRAVENOUS at 08:13

## 2019-03-21 RX ADMIN — OXYCODONE HYDROCHLORIDE 10 MG: 5 SOLUTION ORAL at 18:04

## 2019-03-21 RX ADMIN — OXYCODONE HYDROCHLORIDE 10 MG: 5 SOLUTION ORAL at 15:14

## 2019-03-21 RX ADMIN — GABAPENTIN 300 MG: 300 CAPSULE ORAL at 12:15

## 2019-03-21 RX ADMIN — HYDROMORPHONE HYDROCHLORIDE 0.5 MG: 1 INJECTION, SOLUTION INTRAMUSCULAR; INTRAVENOUS; SUBCUTANEOUS at 03:07

## 2019-03-21 RX ADMIN — ACETAMINOPHEN 1000 MG: 10 INJECTION, SOLUTION INTRAVENOUS at 00:46

## 2019-03-21 RX ADMIN — ACETAMINOPHEN 1000 MG: 500 TABLET ORAL at 05:22

## 2019-03-21 RX ADMIN — ONDANSETRON 4 MG: 2 INJECTION INTRAMUSCULAR; INTRAVENOUS at 00:03

## 2019-03-21 RX ADMIN — HYDROMORPHONE HYDROCHLORIDE 0.5 MG: 1 INJECTION, SOLUTION INTRAMUSCULAR; INTRAVENOUS; SUBCUTANEOUS at 08:10

## 2019-03-21 RX ADMIN — CITALOPRAM HYDROBROMIDE 40 MG: 40 TABLET ORAL at 08:10

## 2019-03-21 RX ADMIN — HYDROMORPHONE HYDROCHLORIDE 0.5 MG: 1 INJECTION, SOLUTION INTRAMUSCULAR; INTRAVENOUS; SUBCUTANEOUS at 12:15

## 2019-03-21 RX ADMIN — SIMETHICONE CHEW TAB 80 MG 80 MG: 80 TABLET ORAL at 00:48

## 2019-03-21 RX ADMIN — ENOXAPARIN SODIUM 40 MG: 100 INJECTION SUBCUTANEOUS at 20:49

## 2019-03-21 RX ADMIN — ACETAMINOPHEN 1000 MG: 500 TABLET ORAL at 16:15

## 2019-03-21 ASSESSMENT — ENCOUNTER SYMPTOMS
HEARTBURN: 0
VOMITING: 0
ABDOMINAL PAIN: 1
NAUSEA: 0
FEVER: 0
SHORTNESS OF BREATH: 0
DIARRHEA: 0
CHILLS: 0
COUGH: 0

## 2019-03-21 ASSESSMENT — COGNITIVE AND FUNCTIONAL STATUS - GENERAL
MOBILITY SCORE: 21
TURNING FROM BACK TO SIDE WHILE IN FLAT BAD: A LITTLE
MOVING TO AND FROM BED TO CHAIR: A LITTLE
SUGGESTED CMS G CODE MODIFIER DAILY ACTIVITY: CJ
HELP NEEDED FOR BATHING: A LITTLE
STANDING UP FROM CHAIR USING ARMS: A LITTLE
SUGGESTED CMS G CODE MODIFIER MOBILITY: CJ
DAILY ACTIVITIY SCORE: 22
DRESSING REGULAR LOWER BODY CLOTHING: A LITTLE

## 2019-03-21 NOTE — CARE PLAN
Problem: Safety  Goal: Will remain free from falls  Outcome: PROGRESSING AS EXPECTED    Intervention: Assess risk factors for falls   03/21/19 0107   OTHER   Fall Risk Risk to Fall - 0 - 1 point   Risk for Injury-Any positive answers results in the pt being at high risk for fall related injury Surgery: Thoracic or Abdominal Surgery or Lower Limb Amputation   Mobility Status Assessment 1-1 Healthcare Provider Required for Assistance with Ambulation & Transfer   History of fall 0   Pt Calls for Assistance Yes     Intervention: Implement fall precautions   03/20/19 2000 03/21/19 0107   OTHER   Environmental Precautions Treaded Slipper Socks on Patient;Personal Belongings, Wastebasket, Call Bell etc. in Easy Reach;Report Given to Other Health Care Providers Regarding Fall Risk;Bed in Low Position;Communication Sign for Patients & Families;Mobility Assessed & Appropriate Sign Placed --    IV Pole on Same Side of Bed as Bathroom --  Yes   Bedrails --  Bedrails Closest to Bathroom Down         Problem: Venous Thromboembolism (VTW)/Deep Vein Thrombosis (DVT) Prevention:  Goal: Patient will participate in Venous Thrombosis (VTE)/Deep Vein Thrombosis (DVT)Prevention Measures  Outcome: PROGRESSING AS EXPECTED   03/20/19 2000   OTHER   Risk Assessment Score 3   VTE RISK High   Pharmacologic Prophylaxis Used LMWH: Enoxaparin(Lovenox)   Mechanical/VTE Prophylaxis   Mechanical Prophylaxis  SCDs, Sequential Compression Device   SCDs, Sequential Compression Device On       Problem: Mobility  Goal: Risk for activity intolerance will decrease  Outcome: PROGRESSING AS EXPECTED  Pt OOB night of POD #0. Tolerated well.

## 2019-03-21 NOTE — ANESTHESIA TIME REPORT
Anesthesia Start and Stop Event Times     Date Time Event    3/20/2019 1603 Anesthesia Start     1738 Anesthesia Stop        Responsible Staff  03/20/19    Name Role Begin End    Ashish Ward M.D. Anesth 1603 1738        Post op Diagnosis  Morbid obesity with BMI of 50.0-59.9, adult (HCC)    Premium Reason  A. 3PM - 7AM      Exception Times    Start Time             End Time                     #                         Name                                                Comments:

## 2019-03-21 NOTE — PROGRESS NOTES
Surgical Progress Note    Author: Renee Ramirez Date & Time created: 3/21/2019   10:10 AM     Interval Events  Pt doing okay POD #1 s/p SARAHY. Denies nausea, vomiting. Ambulating to restroom. Voiding. Tolerating PO. Sitting up in bed. Alert and oriented. NAD. Breathing unlabored. Abdomen soft, tender to incision sites. Dressings clean, dry, intact. PRADEEP serosanguinous. VS reviewed. Labs reviewed. She will likely need another night. Pt seen and examined by Dr. Hernandez.   Review of Systems   Constitutional: Negative for chills and fever.   Respiratory: Negative for cough and shortness of breath.    Gastrointestinal: Positive for abdominal pain. Negative for diarrhea, heartburn, nausea and vomiting.   Skin: Negative for itching and rash.     Hemodynamics:  Temp (24hrs), Av.7 °C (98 °F), Min:36.2 °C (97.2 °F), Max:37.1 °C (98.8 °F)  Temperature: 36.5 °C (97.7 °F)  Pulse  Av.3  Min: 63  Max: 78Heart Rate (Monitored): 64  Blood Pressure: 143/80, NIBP: 108/69     Respiratory:    Respiration: 18, Pulse Oximetry: 94 %, O2 Daily Delivery Respiratory : OxyMask        RUL Breath Sounds: Clear, RML Breath Sounds: Clear, RLL Breath Sounds: Clear, MINDA Breath Sounds: Clear, LLL Breath Sounds: Clear  Neuro:  GCS       Fluids:    Intake/Output Summary (Last 24 hours) at 19 1010  Last data filed at 19 0425   Gross per 24 hour   Intake             2762 ml   Output             2635 ml   Net              127 ml     Weight: (!) 151.6 kg (334 lb 3.5 oz)  Current Diet Order   Procedures   • Diet Order Clear Liquid     Physical Exam   Constitutional: She is oriented to person, place, and time. She appears well-developed and well-nourished.   Cardiovascular: Normal rate.    Pulmonary/Chest: Effort normal. No respiratory distress.   Abdominal: Soft. She exhibits distension. There is tenderness. There is no rebound and no guarding.   Neurological: She is alert and oriented to person, place, and time.   Skin: Skin is warm and dry.  No rash noted. No erythema.   PRADEEP serosanguinous.    Psychiatric: She has a normal mood and affect. Her behavior is normal.   Vitals reviewed.    Labs:  Recent Results (from the past 24 hour(s))   HCG QUALITATIVE URINE (Pregnancy)    Collection Time: 03/20/19  1:50 PM   Result Value Ref Range    Beta-Hcg Urine Negative Negative   REFRACTOMETER SG    Collection Time: 03/20/19  1:50 PM   Result Value Ref Range    Specific Gravity <=1.005    ABO AND RH CONFIRMATION    Collection Time: 03/20/19  2:15 PM   Result Value Ref Range    ABO Confirm A     Second Rh Group NEG    COD (Adult)    Collection Time: 03/20/19  2:20 PM   Result Value Ref Range    ABO Grouping Only A     Rh Grouping Only NEG     Antibody Screen-Cod NEG    CBC without Differential (blood)    Collection Time: 03/21/19  2:52 AM   Result Value Ref Range    WBC 7.2 4.8 - 10.8 K/uL    RBC 3.78 (L) 4.20 - 5.40 M/uL    Hemoglobin 11.6 (L) 12.0 - 16.0 g/dL    Hematocrit 35.6 (L) 37.0 - 47.0 %    MCV 94.2 81.4 - 97.8 fL    MCH 30.7 27.0 - 33.0 pg    MCHC 32.6 (L) 33.6 - 35.0 g/dL    RDW 44.3 35.9 - 50.0 fL    Platelet Count 256 164 - 446 K/uL    MPV 10.7 9.0 - 12.9 fL   Comp Metabolic Panel (CMP)    Collection Time: 03/21/19  2:52 AM   Result Value Ref Range    Sodium 136 135 - 145 mmol/L    Potassium 4.8 3.6 - 5.5 mmol/L    Chloride 102 96 - 112 mmol/L    Co2 24 20 - 33 mmol/L    Anion Gap 10.0 0.0 - 11.9    Glucose 148 (H) 65 - 99 mg/dL    Bun 8 8 - 22 mg/dL    Creatinine 0.64 0.50 - 1.40 mg/dL    Calcium 9.0 8.5 - 10.5 mg/dL    AST(SGOT) 21 12 - 45 U/L    ALT(SGPT) 19 2 - 50 U/L    Alkaline Phosphatase 67 30 - 99 U/L    Total Bilirubin 0.3 0.1 - 1.5 mg/dL    Albumin 3.8 3.2 - 4.9 g/dL    Total Protein 6.2 6.0 - 8.2 g/dL    Globulin 2.4 1.9 - 3.5 g/dL    A-G Ratio 1.6 g/dL   ESTIMATED GFR    Collection Time: 03/21/19  2:52 AM   Result Value Ref Range    GFR If African American >60 >60 mL/min/1.73 m 2    GFR If Non African American >60 >60 mL/min/1.73 m 2      Medical Decision Making, by Problem:  There are no active hospital problems to display for this patient.    Plan:  Pt doing okay POD #1 s/p SARAHY. Denies nausea, vomiting. Ambulating to restroom. Voiding. Tolerating PO. Sitting up in bed. Alert and oriented. NAD. Breathing unlabored. Abdomen soft, tender to incision sites. Dressings clean, dry, intact. PRADEEP serosanguinous. VS reviewed. Labs reviewed. She will likely need another night. Pt seen and examined by Dr. Hernandez.     Quality Measures:  Quality-Core Measures   Reviewed items::  Labs reviewed and Medications reviewed  Hernandez catheter::  No Hernandez  DVT prophylaxis pharmacological::  Enoxaparin (Lovenox)  DVT prophylaxis - mechanical:  SCDs  Ulcer Prophylaxis::  Yes      Discussed patient condition with RN, Patient and Dr. Hernandez

## 2019-03-21 NOTE — ANESTHESIA POSTPROCEDURE EVALUATION
Patient: Nneka Ruiz    Procedure Summary     Date:  03/20/19 Room / Location:  [facility] 12 / SURGERY [facility]    Anesthesia Start:  1603 Anesthesia Stop:  1738    Procedures:       GASTRIC BYPASS LAPAROSCOPIC- REVISION OF GASTRIC RESTRICTION WITH SINGLE ANASTAMOSIS DUODENAL- INTESTINAL BYPASS (Abdomen)      HIATAL HERNIA REPAIR- POSSIBLE OPEN (Abdomen) Diagnosis:       Morbid obesity (HCC)      (MORBID OBESITY)    Surgeon:  Keegan Hernandez M.D. Responsible Provider:  Ashish Ward M.D.    Anesthesia Type:  general ASA Status:  3          Final Anesthesia Type: general  Last vitals  BP   Blood Pressure: 112/84, NIBP: 155/82    Temp   36.2 °C (97.2 °F)    Pulse   Pulse: 75   Resp   16    SpO2   95 %      Anesthesia Post Evaluation    Patient location during evaluation: PACU  Patient participation: complete - patient participated  Level of consciousness: awake and alert  Pain score: 5    Airway patency: patent  Anesthetic complications: no  Cardiovascular status: hemodynamically stable  Respiratory status: acceptable  Hydration status: euvolemic    PONV: none           Nurse Pain Score: 7 (NPRS)

## 2019-03-21 NOTE — PROGRESS NOTES
"Pt A&O x4. Friend visiting at bedside.     Vitals: /80   Pulse 73   Temp 36.4 °C (97.6 °F) (Temporal)   Resp 18   Ht 1.702 m (5' 7\")   Wt (!) 151.6 kg (334 lb 3.5 oz)   LMP 11/13/2018   SpO2 99%   Breastfeeding? No   BMI 52.35 kg/m²     Pt rates pain 8 out of 10. Medicated for pain. Refreshed cold packs.     Neuro: SIERRA. Denies new onset of numbness/ tingling.    Cardiac: Denies new onset of chest pain.    Vascular: Pulses 2+ BUE, BLE. No edema noted.    Respiratory: Lungs sound clear to auscultation. Pulling 2500 on IS, effective, strong effort. On 10L oxy mask, ERAS protocol.  on, satting 100. Denies SOB.    GI: Abdomen soft, rounded, tender, obese. + bowel sounds in upper quadrants, hypoactive in lower quadrants, - flatus, - BM. On clear liquid, gastric bypass diet, tolerating water well. - nausea/ vomiting.    : Pt voiding adequately.      MSK: Pt up to bathroom SBA, tolerating well. Generalized pain/ weakness post-op. LUE pain with movement, limited ROM.     Integumentary: x7 abdominal lap sites, steri-stirps/ bandaids CDI, observed, initial dressings. Scant, dried, sanguinous drainage. Pt had a LUE ORIF earlier this month, shoulder incision remains intact with steri-strips.     Labs noted.    Fall precautions in place: Bed locked in lowest position, Upper bed rails up, treaded socks in place, personal belongings within reach, call light within reach, appropriate mobility signs in place, - bed alarm. Pt calls appropriately.     Pt updated on POC.   "

## 2019-03-21 NOTE — OR NURSING
Patient has done well in recovery. Dr Ward had given her medication in OR and I medicated her here with some IV medication as well. She is feeling much better and easily arouseable. We also discussed her emotional well being and she stated she had an incident a few years ago but is doing so much better now. She stated she goes to therapy and its really helping her. She also is alone this hospital stay and has no family to call. Otherwise she says she is good and her pain is gone now. Report has been called and she will be transported to her room with her belongings. One blue bag and one stripped canvas bag with an anchor on it.

## 2019-03-21 NOTE — ANESTHESIA QCDR
2019 [facility] Clinical Data Registry (for Quality Improvement)     Postoperative nausea/vomiting risk protocol (Adult = 18 yrs and Pediatric 3-17 yrs)- (430 and 463)  General inhalation anesthetic (NOT TIVA) with PONV risk factors: Yes  Provision of anti-emetic therapy with at least 2 different classes of agents: Yes   Patient DID NOT receive anti-emetic therapy and reason is documented in Medical Record:  N/A    Multimodal Pain Management- (AQI59)  Patient undergoing Elective Surgery (i.e. Outpatient, or ASC, or Prescheduled Surgery prior to Hospital Admission): Yes  Use of Multimodal Pain Management, two or more drugs and/or interventions, NOT including systemic opioids: Yes   Exception: Documented allergy to multiple classes of analgesics:  N/A    PACU assessment of acute postoperative pain prior to Anesthesia Care End- Applies to Patients Age = 18- (ABG7)  Initial PACU pain score is which of the following: < 7/10  Patient unable to report pain score: N/A    Post-anesthetic transfer of care checklist/protocol to PACU/ICU- (426 and 427)  Upon conclusion of case, patient transferred to which of the following locations: PACU/Non-ICU  Use of transfer checklist/protocol: Yes  Exclusion: Service Performed in Patient Hospital Room (and thus did not require transfer): N/A    PACU Reintubation- (AQI31)  General anesthesia requiring endotracheal intubation (ETT) along with subsequent extubation in OR or PACU: Yes  Required reintubation in the PACU: No   Extubation was a planned trial documented in the medical record prior to removal of the original airway device:  N/A    Unplanned admission to ICU related to anesthesia service up through end of PACU care- (MD51)  Unplanned admission to ICU (not initially anticipated at anesthesia start time): No

## 2019-03-21 NOTE — DIETARY
NUTRITION SERVICES: BMI - Pt with BMI >40 (=Body mass index is 52.35 kg/m².). Weight loss counseling not appropriate in acute care setting. Pt s/p revision of bariatric gastric restrictive procedure with duodenal ileostomy. Anticipate F/u planned with MD/RD post D/C.    RECOMMEND - Referral to outpatient nutrition services for weight management, or F/u with MD/RD after D/C.

## 2019-03-21 NOTE — PROGRESS NOTES
Report received from NOC shift RN.   A/O X 4.   2L O2 by miguel.   VSS.   Labs reviewed.     BS hypoactive X 4.   X 7 Lap incision on upper abdomen. Approximated with bandaids. Scant drainage is present.   PRADEEP drain present on RUQ. Dressing has moderate drainage present, change required.   -BM. +flatus. +void.     Healing sx incision from LUE ORIF present on right shoulder.     20g PIV on right wrist running LR w20k at 150.   Pt is up standby.     Call light at bedside.     Pt to stay additional night, no one at home to assist her on discharge.

## 2019-03-21 NOTE — OP REPORT
NAME:  Nneka Ruiz  MRN:  [id number]  :  1970    DATE OF OPERATION: 3/20/2019     PREOPERATIVE DIAGNOSES:  1.  Morbid obesity with medical sequelae.  2.  Hiatal hernia    POSTOPERATIVE DIAGNOSIS:   1.  Morbid obesity with medical sequelae.  2.  Hiatal hernia  3. Abdominal adhesion.     OPERATION PERFORMED:  1.  Laparoscopic revision of bariatric gastric restrictive procedure with   duodenal ileostomy.  2.  Hiatal hernia repair  3.  Adhesiolysis.    SURGEON: Keegan Hernandez MD    ASSISTANT:  Zoe Clarke PA-C    ANESTHESIOLOGIST:  Anesthesiologist: Ashish Ward M.D.    ANESTHESIA: General endotracheal anesthesia.     SPECIMEN:  none    ESTIMATED BLOOD LOSS: <20cc.     INDICATIONS FOR PROCEDURE:  The patient is a 48 y.o. female with a history   of morbid obesity who lost weight with sleeve gastrectomy, but has had persistent diabetes and morbid obesity and severe GERD with a hiatal hernia. She comes today for duodenal ileostomy and hiatal hernia repair.     DETAILS OF PROCEDURE:  After an extensive informed consent discussion process,   the patient was brought to the operating room. She was placed in a supine   position on the operating table.  After induction of general anesthesia and   placement of an endotracheal tube, the abdomen was prepped and draped in usual   sterile fashion.  After administration of intravenous antibiotics, a local   anesthetic mixture of bupivacaine with epinephrine was used to infiltrate the   skin site.  An optical entry bladeless trocar was carefully utilized and   introduced, safely establishing pneumoperitoneum.  Three additional bladeless   trocars were carefully introduced in the upper abdomen.  Careful examination   demonstrated a prior sleeve anatomy within normal limits.  The pylorus was   carefully identified and the duodenum was identified.  The gallbladder was normal.  A   careful adhesiolysis and dissection was performed in order to free up the   duodenum  and give us good exposure past the pylorus.  An area approximately 3   cm distal to the pylorus was slowly and carefully freed.   A nice free up path posterior to the   duodenum allowed smooth passage of the echelon stapler.  The [place] 60   mm stapler with blue cartridge was passed posterior to the duodenum safely   and the stapler was fired and a nice division of the duodenum was performed.     We then identified the cecum.  There were fairly dense adhesions in the lower   abdomen and the omentum was adherent in the region of the distal ileum and   cecum and to the anterior abdominal wall.  This took some time to perform   adhesiolysis in order to identify the terminal ileum and the ileocecal   junction.  We were then able to count backwards a generous 300 cm to a   comfortable loop of ileum, which would reach nicely up to the duodenum.  A large 2.5-3m of more proximal bowel was present. We   then used the EndoStitch with 2-0 Polysorb suture to place the superior corner   stitch and approximate the duodenum to the ileum for the single anastomosis   duodenal ileostomy.  The seromuscular corner stitch on each side was placed   and then a nice posterior row of 2-0 Polysorb suture was placed.  The   duodenotomy and enterotomy were then performed on each side and a nice   inspection demonstrated widely patent well perfused bowel on each side.  The   posterior row of the anastomosis was then performed with full thickness bites   using 2-0 Polysorb.  The anterior row was then similarly completed and finally   seromuscular Lembert sutures were placed for a double layered anastomosis.     A methylene blue insufflation leak test was then performed.  The afferent and   efferent limbs of the ileum were occluded.  Cricoid pressure was held.  The   orogastric tube was used to tautly distend the sleeve as well as the   duodenoileostomy anastomosis and no leak was identified.  After   final irrigation and inspections, the  Tisseel fibrin glue sealant was then   generously applied around the anastomotic region.  A #10 flat fluted Holden   drain was placed adjacent to the anastomosis and brought out through a   right-sided trocar site and secured at the skin level with 2-0 nylon suture.      Careful inspection revealed a midline hiatal hernia. Two additional trocars were placed and the Nathonson liver retractor placed to elevate the left lateral segment of the liver. The midline 3.5cm diaphramatic Hiatal Hernia was then repaired with an O-Ethibond Endostitch using the anterior crural technique.    After final inspections, the pneumoperitoneum was allowed to fully escape.    The ports removed under direct vision.  The port sites were irrigated and   closed with Vicryl sutures.  Steri-Strips and sterile dressings were applied.     Needle, sponge, and instrument counts were correct.     ESTIMATED BLOOD LOSS:  Minimal, less than 20 mL     COMPLICATIONS:  None.      ____________________________________   Keegan Hernandez MD  DD: 3/20/2019  5:22 PM    CC:  [facility];

## 2019-03-22 VITALS
RESPIRATION RATE: 16 BRPM | SYSTOLIC BLOOD PRESSURE: 108 MMHG | DIASTOLIC BLOOD PRESSURE: 52 MMHG | WEIGHT: 293 LBS | HEIGHT: 67 IN | OXYGEN SATURATION: 95 % | TEMPERATURE: 97.5 F | BODY MASS INDEX: 45.99 KG/M2 | HEART RATE: 70 BPM

## 2019-03-22 LAB
ALBUMIN SERPL BCP-MCNC: 3.7 G/DL (ref 3.2–4.9)
ALBUMIN/GLOB SERPL: 1.4 G/DL
ALP SERPL-CCNC: 57 U/L (ref 30–99)
ALT SERPL-CCNC: 15 U/L (ref 2–50)
ANION GAP SERPL CALC-SCNC: 8 MMOL/L (ref 0–11.9)
AST SERPL-CCNC: 16 U/L (ref 12–45)
BILIRUB SERPL-MCNC: 0.4 MG/DL (ref 0.1–1.5)
BUN SERPL-MCNC: 8 MG/DL (ref 8–22)
CALCIUM SERPL-MCNC: 9 MG/DL (ref 8.5–10.5)
CHLORIDE SERPL-SCNC: 105 MMOL/L (ref 96–112)
CO2 SERPL-SCNC: 26 MMOL/L (ref 20–33)
CREAT SERPL-MCNC: 0.6 MG/DL (ref 0.5–1.4)
ERYTHROCYTE [DISTWIDTH] IN BLOOD BY AUTOMATED COUNT: 45.1 FL (ref 35.9–50)
GLOBULIN SER CALC-MCNC: 2.6 G/DL (ref 1.9–3.5)
GLUCOSE SERPL-MCNC: 94 MG/DL (ref 65–99)
HCT VFR BLD AUTO: 34.5 % (ref 37–47)
HGB BLD-MCNC: 11.3 G/DL (ref 12–16)
MCH RBC QN AUTO: 31 PG (ref 27–33)
MCHC RBC AUTO-ENTMCNC: 32.8 G/DL (ref 33.6–35)
MCV RBC AUTO: 94.8 FL (ref 81.4–97.8)
PLATELET # BLD AUTO: 237 K/UL (ref 164–446)
PMV BLD AUTO: 10.4 FL (ref 9–12.9)
POTASSIUM SERPL-SCNC: 3.9 MMOL/L (ref 3.6–5.5)
PROT SERPL-MCNC: 6.3 G/DL (ref 6–8.2)
RBC # BLD AUTO: 3.64 M/UL (ref 4.2–5.4)
SODIUM SERPL-SCNC: 139 MMOL/L (ref 135–145)
WBC # BLD AUTO: 7.8 K/UL (ref 4.8–10.8)

## 2019-03-22 PROCEDURE — 85027 COMPLETE CBC AUTOMATED: CPT

## 2019-03-22 PROCEDURE — 700111 HCHG RX REV CODE 636 W/ 250 OVERRIDE (IP): Performed by: NURSE PRACTITIONER

## 2019-03-22 PROCEDURE — A9270 NON-COVERED ITEM OR SERVICE: HCPCS | Performed by: NURSE PRACTITIONER

## 2019-03-22 PROCEDURE — 36415 COLL VENOUS BLD VENIPUNCTURE: CPT

## 2019-03-22 PROCEDURE — 700102 HCHG RX REV CODE 250 W/ 637 OVERRIDE(OP): Performed by: NURSE PRACTITIONER

## 2019-03-22 PROCEDURE — 80053 COMPREHEN METABOLIC PANEL: CPT

## 2019-03-22 RX ADMIN — HYDROCHLOROTHIAZIDE 12.5 MG: 12.5 TABLET ORAL at 05:51

## 2019-03-22 RX ADMIN — OXYCODONE HYDROCHLORIDE 10 MG: 5 SOLUTION ORAL at 06:38

## 2019-03-22 RX ADMIN — GABAPENTIN 300 MG: 300 CAPSULE ORAL at 05:51

## 2019-03-22 RX ADMIN — FAMOTIDINE 20 MG: 20 TABLET ORAL at 05:51

## 2019-03-22 RX ADMIN — ACETAMINOPHEN 1000 MG: 500 TABLET ORAL at 05:51

## 2019-03-22 RX ADMIN — OXYCODONE HYDROCHLORIDE 10 MG: 5 SOLUTION ORAL at 00:31

## 2019-03-22 RX ADMIN — LISINOPRIL 20 MG: 20 TABLET ORAL at 05:51

## 2019-03-22 RX ADMIN — ACETAMINOPHEN 1000 MG: 500 TABLET ORAL at 00:34

## 2019-03-22 RX ADMIN — ENOXAPARIN SODIUM 40 MG: 100 INJECTION SUBCUTANEOUS at 09:50

## 2019-03-22 RX ADMIN — OXYCODONE HYDROCHLORIDE 10 MG: 5 SOLUTION ORAL at 09:50

## 2019-03-22 RX ADMIN — CITALOPRAM HYDROBROMIDE 40 MG: 40 TABLET ORAL at 09:50

## 2019-03-22 RX ADMIN — OXYCODONE HYDROCHLORIDE 10 MG: 5 SOLUTION ORAL at 03:36

## 2019-03-22 ASSESSMENT — ENCOUNTER SYMPTOMS
CHILLS: 0
VOMITING: 0
SHORTNESS OF BREATH: 0
COUGH: 0
DIARRHEA: 0
HEARTBURN: 0
FEVER: 0
NAUSEA: 0
ABDOMINAL PAIN: 1

## 2019-03-22 NOTE — CARE PLAN
Problem: Pain Management  Goal: Pain level will decrease to patient's comfort goal    Intervention: Educate and implement non-pharmacologic comfort measures. Examples: relaxation, distration, play therapy, activity therapy, massage, etc.   03/21/19 1925   OTHER   Intervention Cold Pack;Environmental Changes;Rest;Repositioned         Problem: Mobility  Goal: Risk for activity intolerance will decrease    Intervention: Assess and monitor signs of activity intolerance   03/21/19 1925   OTHER   Assistance / Tolerance No assistance required

## 2019-03-22 NOTE — PROGRESS NOTES
Report received from NOC shift.   A/O X 4.   Room air.   VSS.     Labs reviewed.     BS hypoactive X 4.   -BM. Pt does regular Mag Citrate administrations at home d/t constipation.   +flatus. +void.   RUQ PRADEEP drain removed per MD order.   Dressed with dry gauze and tegaderm.   X 7 lap incisions present with same drainage from previous day.   Right FA PIV dc'd.     Pt has controlled pain with PO hycet.     Pt has been up self since yesterday.     Call light at bedside.

## 2019-03-22 NOTE — PROGRESS NOTES
Assumed care of patient who is AOx4. Pt stated 7 out of 10 pain level. Per pt this has been her baseline. Pain interventions include: ice packs, dimming and cooling down of the room, repositioning, and rest.  This RN discussed and educated pt on PRN pain medications and non-pharmological interventions.  Pt denies SOB, chest pain, nausea, and dizziness.  Pt is tolerating clear liquid diet adequatly  Pt is currently on RA.  Pt has 7 abdominal lap site incisions covered with adhesive bandages. Dressings are dry and intact with scant old serosanguineous drainage.  Pt has a healing surgical incision of the left shoulder from a previous ORIF. Site is CDI and open to air.  Pt has a RUQ PRADEEP with moderate output.   Pt is voiding adequately. Pt last BM was 3/20. Pt has hypoactive bowel sounds, but is reporting passing gas.  Pt ambulates independently.   Bed in lowest position and locked.  RN Reviewed plan of care with patient, bed in lowest position and locked, pt resting comfortably now, call light within reach, all needs met at this time

## 2019-03-22 NOTE — DISCHARGE INSTRUCTIONS
Discharge Instructions    Discharged to home by car with relative. Discharged via wheelchair, hospital escort: Yes.  Special equipment needed: Not Applicable    Be sure to schedule a follow-up appointment with your primary care doctor or any specialists as instructed.     Discharge Plan:     Bariatric D/C instructions:     1. DIET: Follow the diet progression detailed in your post-op booklet.  Progressing as instructed will make for a smooth transition after surgery and prevent any pain associated with eating foods before your stomach is ready or eating too much.  Water and hydration is much more important than food intake the first week or two after surgery.  Drink enough water to keep your urine pale yellow.  Increase water intake if your urine is a darker yellow or if have burning with urination.  Nausea and vomiting once or twice after you leave the hospital is normal.  Sip fluids continually and stay hydrated.     2.  SUPPLEMENTS: Start your supplements 1 week after surgery.  You may need to cut some supplements in half initially.  Follow the guidelines from your supplement handout from your pre-op class.     3. ACTIVITIES: After discharge from the hospital, you may resume full routine activities. However, there should be no heavy lifting (greater than 15 pounds) and no strenuous activities until after your follow-up visit. Otherwise, routine activities of daily living are acceptable.  More movement and walking after surgery the better.     4. DRIVING: You may drive whenever you are off pain medications and are able to perform the activities needed to drive, i.e. turning, bending, twisting, etc.     5. BATHING AND WOUND CARE: You may get the wound wet 2 days after surgery. You may shower, but do not submerge in a bath for at least a week. Dressings may come off after 48 hours. Please leave the steri-strips in place, they will fall off over 5-7 days. You may notice some clear or slightly bloody drainage from  your wounds and there may be some mild redness or bruising around your incision sites.  This is normal.     6. BOWEL FUNCTION: Constipation is common after an operation, especially with pain medications. The combination of pain medication and decreased activity level can cause constipation in otherwise normal patients. If you feel this is occurring, take a laxative (Milk of Magnesia, Miralax, etc.) until the problem has resolved.  Diarrhea the first few days after surgery can also be normal.  You may notice that it is dark in color or see blood, which is also normal and should subside in the first week post-op.     7. PAIN MEDICATION: You have been given a prescription for pain medication preoperatively.  Please take these as directed. It is important to remember not to take medications on an empty stomach as this may cause nausea.  For minimal discomfort you may use liquid Tylenol 650 mg every 4 hours.  DO NOT exceed 4,000 mg of Tylenol in 24 hours.  DO NOT use non steroidal anti-inflamatory medication such as: Asprin, Ibuprofen, Advil, Motrin, Aleve, or steroids.  These medication can cause ulcers after weight loss surgery.     8.CALL IF YOU HAVE: (1) Fevers to more than 101.5 F, (2) leg pain or swelling (3) Drainage or fluid from incision that may be foul smelling, increased tenderness or soreness at the wound or the wound edges are no longer together, redness or swelling at the incision site. (4) Night Sweats (5) Shaking, Chills (6) Persistent Nausea or Vomiting for over 24 hours.  Use your anti nausea medication as prescribed. (7) Call 911 if sudden onset of chest pain or shortness of breath that does not improve with 5-10 min of rest.     9. APPOINTMENT: Contact our office at [phone] for a follow-up appointment in 1 weeks following your procedure.  Our office hours are Monday-Friday, 8am-5pm.  Please try to call during these hours when we are better able to assist you.     If you have any additional  questions, please do not hesitate to call the office and speak to either myself or the physician on call.     Office address:   [facility].   [address]   [address]   [place] [address]    Diet Plan: Discussed  Activity Level: Discussed  Smoking Cessation Offered: Patient Counseled  Confirmed Follow up Appointment: Patient to Call and Schedule Appointment  Confirmed Symptoms Management: Discussed  Medication Reconciliation Updated: Yes  Influenza Vaccine Indication: Indicated: 9 to 64 years of age    I understand that a diet low in cholesterol, fat, and sodium is recommended for good health. Unless I have been given specific instructions below for another diet, I accept this instruction as my diet prescription.   Other diet:     Special Instructions: None    · Is patient discharged on Warfarin / Coumadin?   No         Hiatal Hernia  A hiatal hernia occurs when part of your stomach slides above the muscle that separates your abdomen from your chest (diaphragm). You can be born with a hiatal hernia (congenital), or it may develop over time. In almost all cases of hiatal hernia, only the top part of the stomach pushes through.   Many people have a hiatal hernia with no symptoms. The larger the hernia, the more likely that you will have symptoms. In some cases, a hiatal hernia allows stomach acid to flow back into the tube that carries food from your mouth to your stomach (esophagus). This may cause heartburn symptoms. Severe heartburn symptoms may mean you have developed a condition called gastroesophageal reflux disease (GERD).   CAUSES   Hiatal hernias are caused by a weakness in the opening (hiatus) where your esophagus passes through your diaphragm to attach to the upper part of your stomach. You may be born with a weakness in your hiatus, or a weakness can develop.  RISK FACTORS  Older age is a major risk factor for a hiatal hernia. Anything that increases pressure on your diaphragm can also  increase your risk of a hiatal hernia. This includes:  · Pregnancy.  · Excess weight.  · Frequent constipation.  SIGNS AND SYMPTOMS   People with a hiatal hernia often have no symptoms. If symptoms develop, they are almost always caused by GERD. They may include:  · Heartburn.  · Belching.  · Indigestion.  · Trouble swallowing.  · Coughing or wheezing.   · Sore throat.  · Hoarseness.  · Chest pain.  DIAGNOSIS   A hiatal hernia is sometimes found during an exam for another problem. Your health care provider may suspect a hiatal hernia if you have symptoms of GERD. Tests may be done to diagnose GERD. These may include:  · X-rays of your stomach or chest.  · An upper gastrointestinal (GI) series. This is an X-ray exam of your GI tract involving the use of a chalky liquid that you swallow. The liquid shows up clearly on the X-ray.  · Endoscopy. This is a procedure to look into your stomach using a thin, flexible tube that has a tiny camera and light on the end of it.  TREATMENT   If you have no symptoms, you may not need treatment. If you have symptoms, treatment may include:  · Dietary and lifestyle changes to help reduce GERD symptoms.  · Medicines. These may include:  ¨ Over-the-counter antacids.  ¨ Medicines that make your stomach empty more quickly.  ¨ Medicines that block the production of stomach acid (H2 blockers).  ¨ Stronger medicines to reduce stomach acid (proton pump inhibitors).  · You may need surgery to repair the hernia if other treatments are not helping.  HOME CARE INSTRUCTIONS   · Take all medicines as directed by your health care provider.  · Quit smoking, if you smoke.  · Try to achieve and maintain a healthy body weight.  · Eat frequent small meals instead of three large meals a day. This keeps your stomach from getting too full.  ¨ Eat slowly.  ¨ Do not lie down right after eating.   ¨ Do not eat 1-2 hours before bed.    · Do not drink beverages with caffeine. These include cola, coffee, cocoa,  and tea.  · Do not drink alcohol.  · Avoid foods that can make symptoms of GERD worse. These may include:  ¨ Fatty foods.  ¨ Citrus fruits.  ¨ Other foods and drinks that contain acid.  · Avoid putting pressure on your belly. Anything that puts pressure on your belly increases the amount of acid that may be pushed up into your esophagus.    ¨ Avoid bending over, especially after eating.  ¨ Raise the head of your bed by putting blocks under the legs. This keeps your head and esophagus higher than your stomach.  ¨ Do not wear tight clothing around your chest or stomach.  ¨ Try not to strain when having a bowel movement, when urinating, or when lifting heavy objects.  SEEK MEDICAL CARE IF:  · Your symptoms are not controlled with medicines or lifestyle changes.  · You are having trouble swallowing.  · You have coughing or wheezing that will not go away.  SEEK IMMEDIATE MEDICAL CARE IF:  · Your pain is getting worse.  · Your pain spreads to your arms, neck, jaw, teeth, or back.  · You have shortness of breath.  · You sweat for no reason.  · You feel sick to your stomach (nauseous) or vomit.  · You vomit blood.  · You have bright red blood in your stools.  · You have black, tarry stools.    This information is not intended to replace advice given to you by your health care provider. Make sure you discuss any questions you have with your health care provider.  Document Released: 03/09/2005 Document Revised: 04/10/2017 Document Reviewed: 12/05/2014  [employer] Interactive Patient Education © 2017 [employer]        Depression / Suicide Risk    As you are discharged from this [facility], it is important to learn how to keep safe from harming yourself.    Recognize the warning signs:  · Abrupt changes in personality, positive or negative- including increase in energy   · Giving away possessions  · Change in eating patterns- significant weight changes-  positive or negative  · Change in sleeping patterns- unable  to sleep or sleeping all the time   · Unwillingness or inability to communicate  · Depression  · Unusual sadness, discouragement and loneliness  · Talk of wanting to die  · Neglect of personal appearance   · Rebelliousness- reckless behavior  · Withdrawal from people/activities they love  · Confusion- inability to concentrate     If you or a loved one observes any of these behaviors or has concerns about self-harm, here's what you can do:  · Talk about it- your feelings and reasons for harming yourself  · Remove any means that you might use to hurt yourself (examples: pills, rope, extension cords, firearm)  · Get professional help from the community (Mental Health, Substance Abuse, psychological counseling)  · Do not be alone:Call your Safe Contact- someone whom you trust who will be there for you.  · Call your local CRISIS HOTLINE 058-7473 or 577-027-2230  · Call your local Children's Mobile Crisis Response Team [place] (046) 812-0292 or www.[employer]  · Call the toll free National Suicide Prevention Hotlines   · National Suicide Prevention Lifeline 398-706-HMRH (0574)  · National Hope Line Network 800-SUICIDE (697-5285)    Sleeve Gastrectomy, Care After  Refer to this sheet in the next few weeks. These instructions provide you with information on caring for yourself after your procedure. Your surgeon may also give you more specific instructions. Your treatment has been planned according to current medical practices, but problems sometimes occur. Call your surgeon if you have any problems or questions after your procedure.  HOME CARE INSTRUCTIONS  · Get plenty of rest, but move around frequently for short periods or take short walks as directed by your surgeon. Increase your activities gradually.  · Only take over-the-counter or prescription medicines as directed by your surgeon.  · Keep incision areas clean and dry. Remove or change any bandages (dressings) only as directed by your surgeon. You may  have skin adhesive strips or glue over the incision areas. Do not take the strips or the glue off. They will fall off on their own.  · Check your incisions and surrounding areas daily for any redness, swelling, or drainage of fluid.  · Take showers once your surgeon approves. Until then, only take sponge baths. Pat incisions dry. Do not rub incisions with a washcloth or towel. Do not take tub baths or go swimming until your surgeon approves. Do not put anything on your incision to clean it unless directed to do so by your surgeon.  · Limit activities as directed by your surgeon. You will need to avoid strenuous activity, heavy lifting, and pushing or pulling things with your arms for several weeks. Do not lift anything heavier than 10 lb (4.5 kg).  · Perform deep breathing exercises and coughing as directed by your surgeon. This helps prevent a lung infection.  · Do not drive until your surgeon approves.  · Follow all of the dietary instructions provided by your surgeon or dietitian. You will receive specific instructions on the type, size, and timing of meals.  ¨   ¨   ¨ You may need to stay on a liquid diet for some time after the surgery.  ¨ Drink fluids frequently. You should drink 1 oz of fluid as often as you can.  ¨ Take vitamin, calcium, and protein supplements as directed by your surgeon.  · If you have a drain from the incision area, make sure you:  ¨   ¨   ¨ Keep the area of the drain clean and dry.  ¨ Empty the drain and record the amount of fluid daily.  · Talk with your surgeon about when you may return to work and about your exercise routine.    · Keep all follow-up appointments with your surgeon and dietitian.  SEEK MEDICAL CARE IF:  · Your pain is not controlled with medicine.  · You have a fever.  · You have shaking chills.  · You notice any redness, skin irritation, swelling, or drainage of fluid (other than light red) in the incision area.  · Your drain gets pulled out accidentally.    · Your  drain contains bright red blood, green fluid, or fluid that has a foul smell.  SEEK IMMEDIATE MEDICAL CARE IF:  · You have difficulty breathing.  · You have severe calf pain.  MAKE SURE YOU:  · Understand these instructions.  · Will watch your condition.  · Will get help right away if you are not doing well or get worse.     This information is not intended to replace advice given to you by your health care provider. Make sure you discuss any questions you have with your health care provider.     Document Released: 10/14/2010 Document Revised: 08/20/2014 Document Reviewed: 05/02/2014  [employer] Interactive Patient Education ©2016 [employer]

## 2019-03-22 NOTE — PROGRESS NOTES
Surgical Progress Note    Author: Renee Ramirez Date & Time created: 3/22/2019   8:32 AM     Interval Events:  Pt doing well POD #2 s/p SARAHY. Denies nausea, vomiting. Ambulating to restroom. Voiding. Tolerating PO. Sitting up in bed. Alert and oriented. NAD. Breathing unlabored. Abdomen soft, tender to incision sites. Dressings clean, dry, intact. PRADEEP serosanguinous. VS reviewed. Labs reviewed. Likely home later today. Discussed with Dr. Hernandez.   Review of Systems   Constitutional: Negative for chills and fever.   Respiratory: Negative for cough and shortness of breath.    Gastrointestinal: Positive for abdominal pain. Negative for diarrhea, heartburn, nausea and vomiting.   Skin: Negative for itching and rash.     Hemodynamics:  Temp (24hrs), Av.6 °C (97.8 °F), Min:36.4 °C (97.6 °F), Max:36.8 °C (98.2 °F)  Temperature: 36.4 °C (97.6 °F)  Pulse  Av.7  Min: 63  Max: 78   Blood Pressure: 129/73     Respiratory:    Respiration: 17, Pulse Oximetry: 90 %        RUL Breath Sounds: Clear, RML Breath Sounds: Clear, RLL Breath Sounds: Clear, MINDA Breath Sounds: Clear, LLL Breath Sounds: Clear  Neuro:  GCS       Fluids:    Intake/Output Summary (Last 24 hours) at 19 0832  Last data filed at 19 0407   Gross per 24 hour   Intake             2200 ml   Output              140 ml   Net             2060 ml       Current Diet Order   Procedures   • Diet Order Clear Liquid     Physical Exam   Constitutional: She is oriented to person, place, and time. She appears well-developed and well-nourished. No distress.   Cardiovascular: Normal rate.    Pulmonary/Chest: Effort normal. No respiratory distress.   Abdominal: Soft. She exhibits distension. There is no tenderness. There is no rebound and no guarding.   Neurological: She is alert and oriented to person, place, and time.   Skin: Skin is warm and dry. No rash noted. She is not diaphoretic. No erythema.   Psychiatric: She has a normal mood and affect. Her behavior is  normal.   Vitals reviewed.    Labs:  Recent Results (from the past 24 hour(s))   CBC without Differential (blood)    Collection Time: 03/22/19  3:34 AM   Result Value Ref Range    WBC 7.8 4.8 - 10.8 K/uL    RBC 3.64 (L) 4.20 - 5.40 M/uL    Hemoglobin 11.3 (L) 12.0 - 16.0 g/dL    Hematocrit 34.5 (L) 37.0 - 47.0 %    MCV 94.8 81.4 - 97.8 fL    MCH 31.0 27.0 - 33.0 pg    MCHC 32.8 (L) 33.6 - 35.0 g/dL    RDW 45.1 35.9 - 50.0 fL    Platelet Count 237 164 - 446 K/uL    MPV 10.4 9.0 - 12.9 fL   Comp Metabolic Panel (CMP)    Collection Time: 03/22/19  3:34 AM   Result Value Ref Range    Sodium 139 135 - 145 mmol/L    Potassium 3.9 3.6 - 5.5 mmol/L    Chloride 105 96 - 112 mmol/L    Co2 26 20 - 33 mmol/L    Anion Gap 8.0 0.0 - 11.9    Glucose 94 65 - 99 mg/dL    Bun 8 8 - 22 mg/dL    Creatinine 0.60 0.50 - 1.40 mg/dL    Calcium 9.0 8.5 - 10.5 mg/dL    AST(SGOT) 16 12 - 45 U/L    ALT(SGPT) 15 2 - 50 U/L    Alkaline Phosphatase 57 30 - 99 U/L    Total Bilirubin 0.4 0.1 - 1.5 mg/dL    Albumin 3.7 3.2 - 4.9 g/dL    Total Protein 6.3 6.0 - 8.2 g/dL    Globulin 2.6 1.9 - 3.5 g/dL    A-G Ratio 1.4 g/dL   ESTIMATED GFR    Collection Time: 03/22/19  3:34 AM   Result Value Ref Range    GFR If African American >60 >60 mL/min/1.73 m 2    GFR If Non African American >60 >60 mL/min/1.73 m 2     Medical Decision Making, by Problem:  There are no active hospital problems to display for this patient.    Plan:  Pt doing well POD #2 s/p SARAHY. Denies nausea, vomiting. Ambulating to restroom. Voiding. Tolerating PO. Sitting up in bed. Alert and oriented. NAD. Breathing unlabored. Abdomen soft, tender to incision sites. Dressings clean, dry, intact. PRADEEP serosanguinous. VS reviewed. Labs reviewed. Likely home later today. Discussed with Dr. Hernandez.    Quality Measures:  Quality-Core Measures   Reviewed items::  Labs reviewed and Medications reviewed  Hernandez catheter::  No Hernandez  DVT prophylaxis pharmacological::  Enoxaparin (Lovenox)  DVT  prophylaxis - mechanical:  SCDs  Ulcer Prophylaxis::  Yes      Discussed patient condition with RN, Patient and Dr. Hernandez

## 2019-03-22 NOTE — DISCHARGE SUMMARY
Discharge Summary    CHIEF COMPLAINT ON ADMISSION  No chief complaint on file.      Reason for Admission  MORBID OBESITY     Admission Date  3/20/2019    CODE STATUS  Full Code    HPI & HOSPITAL COURSE  This is a 48 y.o. female here with complications of morbid obesity. She was admitted and the following procedure performed.         Therefore, she is discharged in good and stable condition to home with close outpatient follow-up.    The patient met 2-midnight criteria for an inpatient stay at the time of discharge.    Discharge Date  3/22/19    FOLLOW UP ITEMS POST DISCHARGE  Follow up in 1 week.    DISCHARGE DIAGNOSES  Active Problems:    * No active hospital problems. *  Resolved Problems:    * No resolved hospital problems. *      FOLLOW UP  No future appointments.  No follow-up provider specified.    MEDICATIONS ON DISCHARGE     Medication List      CONTINUE taking these medications      Instructions   acetaminophen 650 MG CR tablet  Commonly known as:  TYLENOL   Take 1,300 mg by mouth every 6 hours as needed for Moderate Pain.  Dose:  1300 mg     citalopram 40 MG Tabs  Commonly known as:  CELEXA   Take 40 mg by mouth every day.  Dose:  40 mg     HYDROcodone-acetaminophen 5-325 MG Tabs per tablet  Commonly known as:  NORCO   Take 0.5-1 Tabs by mouth every 6 hours as needed.  Dose:  0.5-1 Tab     lisinopril-hydrochlorothiazide 20-12.5 MG per tablet  Commonly known as:  PRINZIDE, ZESTORETIC   Take 1 Tab by mouth every morning.  Dose:  1 Tab     omeprazole 20 MG delayed-release capsule  Commonly known as:  PRILOSEC   Take 20 mg by mouth 2 times a day as needed.  Dose:  20 mg     tramadol 50 MG Tabs  Commonly known as:  ULTRAM   Take 50 mg by mouth every 8 hours as needed for Moderate Pain.  Dose:  50 mg     TURMERIC PO   Take 2 Caps by mouth every day.  Dose:  2 Cap        STOP taking these medications    ibuprofen 800 MG Tabs  Commonly known as:  MOTRIN            Allergies  Allergies   Allergen Reactions   •  "Aspirin Vomiting   • Other Misc      Waterproof band aids took off skin and caused infection    • Nsaids Unspecified     Pt states not allergic to ALL nsaids, just \"not supposed to take them after gastric sleeve surgery\"       DIET  Orders Placed This Encounter   Procedures   • Diet Order Clear Liquid     Standing Status:   Standing     Number of Occurrences:   1     Order Specific Question:   Diet:     Answer:   Clear Liquid [10]     Order Specific Question:   Miscellaneous modifications:     Answer:   Gastric Bypass [3]       ACTIVITY  As tolerated.  Weight bearing as tolerated    CONSULTATIONS  none    PROCEDURES  SARAHY    LABORATORY  Lab Results   Component Value Date    SODIUM 139 03/22/2019    POTASSIUM 3.9 03/22/2019    CHLORIDE 105 03/22/2019    CO2 26 03/22/2019    GLUCOSE 94 03/22/2019    BUN 8 03/22/2019    CREATININE 0.60 03/22/2019    CREATININE 0.8 12/22/2007        Lab Results   Component Value Date    WBC 7.8 03/22/2019    HEMOGLOBIN 11.3 (L) 03/22/2019    HEMATOCRIT 34.5 (L) 03/22/2019    PLATELETCT 237 03/22/2019        Total time of the discharge process exceeds 31 minutes.  "

## 2019-03-28 ENCOUNTER — HOSPITAL ENCOUNTER (EMERGENCY)
Facility: MEDICAL CENTER | Age: 49
End: 2019-03-28
Attending: EMERGENCY MEDICINE
Payer: COMMERCIAL

## 2019-03-28 ENCOUNTER — APPOINTMENT (OUTPATIENT)
Dept: RADIOLOGY | Facility: MEDICAL CENTER | Age: 49
End: 2019-03-28
Attending: EMERGENCY MEDICINE
Payer: COMMERCIAL

## 2019-03-28 VITALS
HEIGHT: 67 IN | TEMPERATURE: 98.1 F | OXYGEN SATURATION: 98 % | DIASTOLIC BLOOD PRESSURE: 82 MMHG | SYSTOLIC BLOOD PRESSURE: 156 MMHG | BODY MASS INDEX: 45.99 KG/M2 | RESPIRATION RATE: 17 BRPM | HEART RATE: 72 BPM | WEIGHT: 293 LBS

## 2019-03-28 DIAGNOSIS — R07.9 CHEST PAIN, UNSPECIFIED TYPE: ICD-10-CM

## 2019-03-28 LAB
ALBUMIN SERPL BCP-MCNC: 4.2 G/DL (ref 3.2–4.9)
ALBUMIN/GLOB SERPL: 1.3 G/DL
ALP SERPL-CCNC: 69 U/L (ref 30–99)
ALT SERPL-CCNC: 24 U/L (ref 2–50)
ANION GAP SERPL CALC-SCNC: 8 MMOL/L (ref 0–11.9)
AST SERPL-CCNC: 18 U/L (ref 12–45)
BASOPHILS # BLD AUTO: 0.5 % (ref 0–1.8)
BASOPHILS # BLD: 0.04 K/UL (ref 0–0.12)
BILIRUB SERPL-MCNC: 0.4 MG/DL (ref 0.1–1.5)
BUN SERPL-MCNC: 8 MG/DL (ref 8–22)
CALCIUM SERPL-MCNC: 9.6 MG/DL (ref 8.5–10.5)
CHLORIDE SERPL-SCNC: 102 MMOL/L (ref 96–112)
CO2 SERPL-SCNC: 24 MMOL/L (ref 20–33)
CREAT SERPL-MCNC: 0.55 MG/DL (ref 0.5–1.4)
EKG IMPRESSION: NORMAL
EOSINOPHIL # BLD AUTO: 0.08 K/UL (ref 0–0.51)
EOSINOPHIL NFR BLD: 1.1 % (ref 0–6.9)
ERYTHROCYTE [DISTWIDTH] IN BLOOD BY AUTOMATED COUNT: 41.4 FL (ref 35.9–50)
GLOBULIN SER CALC-MCNC: 3.3 G/DL (ref 1.9–3.5)
GLUCOSE SERPL-MCNC: 83 MG/DL (ref 65–99)
HCT VFR BLD AUTO: 38.8 % (ref 37–47)
HGB BLD-MCNC: 12.9 G/DL (ref 12–16)
IMM GRANULOCYTES # BLD AUTO: 0.01 K/UL (ref 0–0.11)
IMM GRANULOCYTES NFR BLD AUTO: 0.1 % (ref 0–0.9)
LIPASE SERPL-CCNC: 54 U/L (ref 11–82)
LYMPHOCYTES # BLD AUTO: 2.99 K/UL (ref 1–4.8)
LYMPHOCYTES NFR BLD: 40.2 % (ref 22–41)
MCH RBC QN AUTO: 30.3 PG (ref 27–33)
MCHC RBC AUTO-ENTMCNC: 33.2 G/DL (ref 33.6–35)
MCV RBC AUTO: 91.1 FL (ref 81.4–97.8)
MONOCYTES # BLD AUTO: 0.46 K/UL (ref 0–0.85)
MONOCYTES NFR BLD AUTO: 6.2 % (ref 0–13.4)
NEUTROPHILS # BLD AUTO: 3.85 K/UL (ref 2–7.15)
NEUTROPHILS NFR BLD: 51.9 % (ref 44–72)
NRBC # BLD AUTO: 0 K/UL
NRBC BLD-RTO: 0 /100 WBC
PLATELET # BLD AUTO: 271 K/UL (ref 164–446)
PMV BLD AUTO: 11.4 FL (ref 9–12.9)
POTASSIUM SERPL-SCNC: 3.8 MMOL/L (ref 3.6–5.5)
PROT SERPL-MCNC: 7.5 G/DL (ref 6–8.2)
RBC # BLD AUTO: 4.26 M/UL (ref 4.2–5.4)
SODIUM SERPL-SCNC: 134 MMOL/L (ref 135–145)
TROPONIN I SERPL-MCNC: <0.01 NG/ML (ref 0–0.04)
WBC # BLD AUTO: 7.4 K/UL (ref 4.8–10.8)

## 2019-03-28 PROCEDURE — 85025 COMPLETE CBC W/AUTO DIFF WBC: CPT

## 2019-03-28 PROCEDURE — 83690 ASSAY OF LIPASE: CPT

## 2019-03-28 PROCEDURE — 700102 HCHG RX REV CODE 250 W/ 637 OVERRIDE(OP): Performed by: EMERGENCY MEDICINE

## 2019-03-28 PROCEDURE — A9270 NON-COVERED ITEM OR SERVICE: HCPCS | Performed by: EMERGENCY MEDICINE

## 2019-03-28 PROCEDURE — 71275 CT ANGIOGRAPHY CHEST: CPT

## 2019-03-28 PROCEDURE — 93005 ELECTROCARDIOGRAM TRACING: CPT | Performed by: EMERGENCY MEDICINE

## 2019-03-28 PROCEDURE — 93005 ELECTROCARDIOGRAM TRACING: CPT

## 2019-03-28 PROCEDURE — 80053 COMPREHEN METABOLIC PANEL: CPT

## 2019-03-28 PROCEDURE — 700117 HCHG RX CONTRAST REV CODE 255: Performed by: EMERGENCY MEDICINE

## 2019-03-28 PROCEDURE — 84484 ASSAY OF TROPONIN QUANT: CPT

## 2019-03-28 PROCEDURE — 700105 HCHG RX REV CODE 258: Performed by: EMERGENCY MEDICINE

## 2019-03-28 PROCEDURE — 99284 EMERGENCY DEPT VISIT MOD MDM: CPT

## 2019-03-28 RX ORDER — SODIUM CHLORIDE 9 MG/ML
1000 INJECTION, SOLUTION INTRAVENOUS ONCE
Status: COMPLETED | OUTPATIENT
Start: 2019-03-28 | End: 2019-03-28

## 2019-03-28 RX ADMIN — LIDOCAINE HYDROCHLORIDE 30 ML: 20 SOLUTION OROPHARYNGEAL at 21:13

## 2019-03-28 RX ADMIN — SODIUM CHLORIDE 1000 ML: 9 INJECTION, SOLUTION INTRAVENOUS at 19:57

## 2019-03-28 RX ADMIN — IOHEXOL 75 ML: 350 INJECTION, SOLUTION INTRAVENOUS at 20:43

## 2019-03-28 ASSESSMENT — ENCOUNTER SYMPTOMS
SHORTNESS OF BREATH: 0
COUGH: 0
BACK PAIN: 0
ABDOMINAL PAIN: 1
SORE THROAT: 0
NECK PAIN: 0

## 2019-03-28 NOTE — ED TRIAGE NOTES
"Nneka Ruiz    Chief Complaint   Patient presents with   • Post-Op Complications   • Shoulder Pain     Left side        Pt ambulatory to triage with above complaint. Gastric bypass revision done Wednesday by Dr Hernandez.  Pt reports \"gas bubble\" near L shoulder/L chest.  +pressure +gnawing feeling.  +shallow breathing due to pain. OTC medications with no relief. VSS. PT also had L humerus ORIF done 3/1. EKG done in triage.     Pt returned to lobby, educated on triage process, and to inform staff of any changes or concerns.    Blood Pressure: 156/82, Pulse: 84, Respiration: 18, Temperature: 36.6 °C (97.9 °F), Height: 170.2 cm (5' 7\"), Pulse Oximetry: 97 %, O2 Delivery: None (Room Air)    "

## 2019-03-29 NOTE — ED PROVIDER NOTES
"ED Provider Note    Scribed for Thong Spangler M.D. by Na Villalobos. 3/28/2019, 6:29 PM.    Primary care provider: Naveed Savage M.D.  Means of arrival: walk-in  History obtained from: patient  History limited by: none    CHIEF COMPLAINT  Chief Complaint   Patient presents with   • Post-Op Complications   • Shoulder Pain     Left side        HPI  Nneka Ruiz is a 48 y.o. female who presents to the Emergency Department 8 days status post a gastric bypass revision and hiatal hernia repair completed by Dr. Hernandez complaining of pain in the left shoulder and left upper chest onset within the last several days. This pain is described as a pressure, \"gnawing feeling,\" and the patient believes it is excess gas. Discomfort is exacerbated with deep breaths and worsened significantly over the last 2 days secondary to her being out of her post op pain medications. Patient is also complaining of post op abdominal pain along laparoscopic incision sites. She has been using heat, activated charcoal and other OTC gas medication with no improvement to symptoms. Patient has been completed her post op Lovenox injections as instructed as well.  No complaints of cough, shortness of breath, sore throat, congestion, neck pain, back pain, arm pain.    REVIEW OF SYSTEMS  Review of Systems   HENT: Negative for congestion and sore throat.    Respiratory: Negative for cough and shortness of breath.    Cardiovascular: Positive for chest pain.   Gastrointestinal: Positive for abdominal pain.   Musculoskeletal: Positive for joint pain (negative arm pain). Negative for back pain and neck pain.   All other systems reviewed and are negative.      PAST MEDICAL HISTORY   has a past medical history of Anemia; Anxiety; Bowel habit changes; Cancer (HCC) (2009); Cold; Dental disorder; Gynecological disorder; Heart burn; Hiatus hernia syndrome; Hypertension; Pain (2014); and Psychiatric problem.    SURGICAL HISTORY   has a past surgical " "history that includes tubal ligation; tonsillectomy; tympanoplasty (11/6/2012); knee arthroscopy (12/10/2014); medial meniscectomy (12/10/2014); meniscectomy (12/10/2014); lateral release (12/10/2014); gastric sleeve laparoscopy (N/A, 2/3/2016); gastroscopy (5/17/2017); humerus orif (Left, 3/1/2019); gyn surgery; gastric bypass laparoscopic (3/20/2019); and hiatal hernia repair (3/20/2019).    SOCIAL HISTORY  Social History   Substance Use Topics   • Smoking status: Former Smoker     Packs/day: 1.00     Years: 12.00     Types: Cigarettes     Quit date: 2/11/2019   • Smokeless tobacco: Never Used      Comment: 2 yrs  1/2 ppd   • Alcohol use No      History   Drug Use No     Comment: meth  none since 2001       FAMILY HISTORY  Family History   Problem Relation Age of Onset   • Breast Cancer Father        CURRENT MEDICATIONS  Home Medications     Reviewed by Keara Resendez R.N. (Registered Nurse) on 03/28/19 at 1644  Med List Status: Not Addressed   Medication Last Dose Status   acetaminophen (TYLENOL) 650 MG CR tablet  Active   citalopram (CELEXA) 40 MG Tab  Active   HYDROcodone-acetaminophen (NORCO) 5-325 MG Tab per tablet  Active   lisinopril-hydrochlorothiazide (PRINZIDE, ZESTORETIC) 20-12.5 MG per tablet  Active   omeprazole (PRILOSEC) 20 MG delayed-release capsule  Active   tramadol (ULTRAM) 50 MG Tab  Active   TURMERIC PO  Active                ALLERGIES  Allergies   Allergen Reactions   • Aspirin Vomiting   • Other Misc      Waterproof band aids took off skin and caused infection    • Nsaids Unspecified     Pt states not allergic to ALL nsaids, just \"not supposed to take them after gastric sleeve surgery\"       PHYSICAL EXAM  VITAL SIGNS: /82   Pulse 84   Temp 36.6 °C (97.9 °F) (Temporal)   Resp 18   Ht 1.702 m (5' 7\")   Wt (!) 145.9 kg (321 lb 10.4 oz)   SpO2 97%   BMI 50.38 kg/m²     Constitutional:   No acute distress  HENT:  Moist mucous membranes  Eyes:  No conjunctivitis or icterus  Neck: "  trachea is midline, no palpable thyroid  Lymphatic:  No cervical lymphadenopathy  Cardiovascular:  Regular rate and rhythm, no murmurs  Thorax & Lungs:  Normal breath sounds, no rhonchi  Abdomen: obese, upper abdominal tenderness along laparoscopic incision sites, no peritoneal signs. Soft  Skin:.  no rash  Back:  Non-tender, no CVA tenderness  Extremities:   no edema  Vascular:  symmetric radial pulse  Neurologic:  Normal gross motor    LABS  Labs Reviewed   CBC WITH DIFFERENTIAL - Abnormal; Notable for the following:        Result Value    MCHC 33.2 (*)     All other components within normal limits   COMP METABOLIC PANEL - Abnormal; Notable for the following:     Sodium 134 (*)     All other components within normal limits   LIPASE   TROPONIN   ESTIMATED GFR     Results for orders placed or performed during the hospital encounter of 19   EKG (NOW)   Result Value Ref Range    Report       [facility] Emergency Dept[facility]    Test Date:  2019  Pt Name:    SINGH DUPONT              Department: ER  MRN:        [id number]                      Room:  Gender:     Female                       Technician: [id number]  :        1970                   Requested By:ER TRIAGE PROTOCOL  Order #:    [id number]                    Reading MD: KELSEY TOTH MD    Measurements  Intervals                                Axis  Rate:       71                           P:          13  VT:         180                          QRS:        21  QRSD:       92                           T:          13  QT:         408  QTc:        444    Interpretive Statements  Normal sinus rhythm rate of 71 normal corrected QT interval QRS and axis  nonspecific T wave abnormalities only are noted unchanged  Electronically Signed On 3- 21:35:56 PDT by KELSEY TOTH MD         RADIOLOGY  CT-CTA CHEST PULMONARY ARTERY W/ RECONS   Final Result      1.  No evidence of pulmonary embolism.      2.  Clear lung parenchyma.               The radiologist's interpretation of all radiological studies have been reviewed by me.    COURSE & MEDICAL DECISION MAKING  Pertinent Labs & Imaging studies reviewed. (See chart for details)    6:29 PM - Patient seen and examined at bedside. Patient will be treated with IV NS for possible surgical etiology. Ordered chest pulmonary CTA CT, CBC, CMP, lipase, troponin, and EKG to evaluate her symptoms. The differential diagnoses include but are not limited to: PE, pneumothorax, pancreatitis.     Medical Decision Making:   Patient presents with atypical left upper chest pain is been there for almost a week after her surgery for gastric bypass reconstruction.  She has been asked to come in by her surgeon's office.  Main concern is pulmonary embolism but also could be unrelated to the surgery and be heart related.  CT a of the chest was obtained there is no PE no pneumonia no pneumothorax.  EKG is normal troponin was nondetectable her heart score is 2 recommending immediate discharge.    GI cocktail was given it is unhelpful.  I am to discharge the patient with nonspecific chest pain precautions she is instructed to contact her surgeon tomorrow for follow-up    FINAL IMPRESSION  1. Chest pain, unspecified type          I, Na Villalobos (Bryan), am scribing for, and in the presence of, Thong Spangler M.D..    Electronically signed by: Na Villalobos (Bryan), 3/28/2019    IThong M.D. personally performed the services described in this documentation, as scribed by Na Villalobos in my presence, and it is both accurate and complete.    The note accurately reflects work and decisions made by me.  Thong Spangler  3/28/2019  9:43 PM

## 2019-03-29 NOTE — ED NOTES
PIV removed and bandaged.  Nneka Ruiz discharged via ambulation independently with steady gait with mother.  Discharge instructions given and reviewed, patient educated to follow up with Primary MD if needed, verbalized understanding.  All personal belongings in possession.  No questions at this time.

## 2019-03-29 NOTE — ED NOTES
Pt reports left shoulder pain.  Pt had gastric bypass revision surgery 8 days ago with MD Hernandez.  Pt had left ORIF on 3/1.  Incision well approximated, no s/s of infection.

## 2019-04-03 NOTE — DOCUMENTATION QUERY
[facility]                                                                         Query Response Note      PATIENT:               SINGH DUPONT  ACCT #:                  [id number]  MRN:                       [id number]  :                       1970  ADMIT DATE:       3/20/2019 1:09 PM  DISCH DATE:        3/22/2019 10:45 AM  RESPONDING  PROVIDER #:        [id number]           RESPONSE TEXT:    MDD, recurrent, in partial remission    QUERY TEXT:    Depression Type [id number]    Depression is documented in the Medical Record.  Please specify the type.    NOTE:  If an appropriate response is not listed below, please respond with a new note.              The patient's Clinical Indicators include:  Depression is documented in past medical history in H&P.  Patient discharged on Celexa.  Query created by: Katiuska Van on 3/30/2019 5:08 AM        Electronically signed by:  CORY SHIRLEY MD 4/3/2019 7:49 AM

## 2019-04-24 NOTE — ADDENDUM NOTE
Encounter addended by: Keegan Hernandez M.D. on: 4/24/2019  4:04 PM<BR>    Actions taken: Sign clinical note

## 2019-04-30 ENCOUNTER — HOSPITAL ENCOUNTER (OUTPATIENT)
Dept: RADIOLOGY | Facility: MEDICAL CENTER | Age: 49
End: 2019-04-30
Attending: NURSE PRACTITIONER
Payer: COMMERCIAL

## 2019-04-30 DIAGNOSIS — R10.31 ABDOMINAL PAIN, RIGHT LOWER QUADRANT: ICD-10-CM

## 2019-04-30 PROCEDURE — 76700 US EXAM ABDOM COMPLETE: CPT

## 2019-05-21 ENCOUNTER — HOSPITAL ENCOUNTER (OUTPATIENT)
Dept: LAB | Facility: MEDICAL CENTER | Age: 49
End: 2019-05-21
Attending: NURSE PRACTITIONER
Payer: COMMERCIAL

## 2019-05-21 LAB
25(OH)D3 SERPL-MCNC: 34 NG/ML (ref 30–100)
ALBUMIN SERPL BCP-MCNC: 3.7 G/DL (ref 3.2–4.9)
ALBUMIN/GLOB SERPL: 1.2 G/DL
ALP SERPL-CCNC: 92 U/L (ref 30–99)
ALT SERPL-CCNC: 11 U/L (ref 2–50)
ANION GAP SERPL CALC-SCNC: 7 MMOL/L (ref 0–11.9)
AST SERPL-CCNC: 16 U/L (ref 12–45)
BASOPHILS # BLD AUTO: 0.4 % (ref 0–1.8)
BASOPHILS # BLD: 0.02 K/UL (ref 0–0.12)
BILIRUB SERPL-MCNC: 0.5 MG/DL (ref 0.1–1.5)
BUN SERPL-MCNC: 10 MG/DL (ref 8–22)
CALCIUM SERPL-MCNC: 9.2 MG/DL (ref 8.5–10.5)
CHLORIDE SERPL-SCNC: 104 MMOL/L (ref 96–112)
CHOLEST SERPL-MCNC: 114 MG/DL (ref 100–199)
CO2 SERPL-SCNC: 25 MMOL/L (ref 20–33)
CREAT SERPL-MCNC: 0.69 MG/DL (ref 0.5–1.4)
EOSINOPHIL # BLD AUTO: 0.02 K/UL (ref 0–0.51)
EOSINOPHIL NFR BLD: 0.4 % (ref 0–6.9)
ERYTHROCYTE [DISTWIDTH] IN BLOOD BY AUTOMATED COUNT: 46.5 FL (ref 35.9–50)
FASTING STATUS PATIENT QL REPORTED: NORMAL
FOLATE SERPL-MCNC: 4.9 NG/ML
GLOBULIN SER CALC-MCNC: 3.2 G/DL (ref 1.9–3.5)
GLUCOSE SERPL-MCNC: 71 MG/DL (ref 65–99)
HCT VFR BLD AUTO: 41.4 % (ref 37–47)
HDLC SERPL-MCNC: 37 MG/DL
HGB BLD-MCNC: 13.2 G/DL (ref 12–16)
IMM GRANULOCYTES # BLD AUTO: 0.01 K/UL (ref 0–0.11)
IMM GRANULOCYTES NFR BLD AUTO: 0.2 % (ref 0–0.9)
IRON SATN MFR SERPL: 14 % (ref 15–55)
IRON SERPL-MCNC: 46 UG/DL (ref 40–170)
LDLC SERPL CALC-MCNC: 65 MG/DL
LYMPHOCYTES # BLD AUTO: 2.22 K/UL (ref 1–4.8)
LYMPHOCYTES NFR BLD: 40.6 % (ref 22–41)
MCH RBC QN AUTO: 29.1 PG (ref 27–33)
MCHC RBC AUTO-ENTMCNC: 31.9 G/DL (ref 33.6–35)
MCV RBC AUTO: 91.4 FL (ref 81.4–97.8)
MONOCYTES # BLD AUTO: 0.33 K/UL (ref 0–0.85)
MONOCYTES NFR BLD AUTO: 6 % (ref 0–13.4)
NEUTROPHILS # BLD AUTO: 2.87 K/UL (ref 2–7.15)
NEUTROPHILS NFR BLD: 52.4 % (ref 44–72)
NRBC # BLD AUTO: 0 K/UL
NRBC BLD-RTO: 0 /100 WBC
PLATELET # BLD AUTO: 261 K/UL (ref 164–446)
PMV BLD AUTO: 11.9 FL (ref 9–12.9)
POTASSIUM SERPL-SCNC: 4.2 MMOL/L (ref 3.6–5.5)
PREALB SERPL-MCNC: 17 MG/DL (ref 18–38)
PROT SERPL-MCNC: 6.9 G/DL (ref 6–8.2)
RBC # BLD AUTO: 4.53 M/UL (ref 4.2–5.4)
SODIUM SERPL-SCNC: 136 MMOL/L (ref 135–145)
TIBC SERPL-MCNC: 321 UG/DL (ref 250–450)
TRANSFERRIN SERPL-MCNC: 225 MG/DL (ref 200–370)
TRIGL SERPL-MCNC: 62 MG/DL (ref 0–149)
VIT B12 SERPL-MCNC: 841 PG/ML (ref 211–911)
WBC # BLD AUTO: 5.5 K/UL (ref 4.8–10.8)

## 2019-05-21 PROCEDURE — 85025 COMPLETE CBC W/AUTO DIFF WBC: CPT

## 2019-05-21 PROCEDURE — 80061 LIPID PANEL: CPT

## 2019-05-21 PROCEDURE — 82607 VITAMIN B-12: CPT

## 2019-05-21 PROCEDURE — 84252 ASSAY OF VITAMIN B-2: CPT

## 2019-05-21 PROCEDURE — 36415 COLL VENOUS BLD VENIPUNCTURE: CPT

## 2019-05-21 PROCEDURE — 84466 ASSAY OF TRANSFERRIN: CPT

## 2019-05-21 PROCEDURE — 80053 COMPREHEN METABOLIC PANEL: CPT

## 2019-05-21 PROCEDURE — 84425 ASSAY OF VITAMIN B-1: CPT

## 2019-05-21 PROCEDURE — 82746 ASSAY OF FOLIC ACID SERUM: CPT

## 2019-05-21 PROCEDURE — 84207 ASSAY OF VITAMIN B-6: CPT

## 2019-05-21 PROCEDURE — 83540 ASSAY OF IRON: CPT

## 2019-05-21 PROCEDURE — 82306 VITAMIN D 25 HYDROXY: CPT

## 2019-05-21 PROCEDURE — 83550 IRON BINDING TEST: CPT

## 2019-05-21 PROCEDURE — 84134 ASSAY OF PREALBUMIN: CPT

## 2019-05-24 LAB — VIT B1 BLD-MCNC: 115 NMOL/L (ref 70–180)

## 2019-05-25 LAB
VIT B2 SERPL-SCNC: 16 NMOL/L (ref 5–50)
VIT B6 SERPL-MCNC: 191.2 NMOL/L (ref 20–125)

## 2019-07-02 ENCOUNTER — HOSPITAL ENCOUNTER (OUTPATIENT)
Dept: RADIOLOGY | Facility: MEDICAL CENTER | Age: 49
End: 2019-07-02
Attending: COLON & RECTAL SURGERY

## 2019-07-02 DIAGNOSIS — R10.31 ABDOMINAL PAIN, RIGHT LOWER QUADRANT: ICD-10-CM

## 2019-07-02 DIAGNOSIS — R11.2 NAUSEA AND VOMITING, INTRACTABILITY OF VOMITING NOT SPECIFIED, UNSPECIFIED VOMITING TYPE: ICD-10-CM

## 2019-07-02 PROCEDURE — 700117 HCHG RX CONTRAST REV CODE 255: Performed by: COLON & RECTAL SURGERY

## 2019-07-02 PROCEDURE — 74177 CT ABD & PELVIS W/CONTRAST: CPT

## 2019-07-02 RX ADMIN — IOHEXOL 50 ML: 240 INJECTION, SOLUTION INTRATHECAL; INTRAVASCULAR; INTRAVENOUS; ORAL at 17:11

## 2019-07-02 RX ADMIN — IOHEXOL 100 ML: 350 INJECTION, SOLUTION INTRAVENOUS at 17:10

## 2019-09-19 ENCOUNTER — HOSPITAL ENCOUNTER (OUTPATIENT)
Dept: LAB | Facility: MEDICAL CENTER | Age: 49
End: 2019-09-19
Attending: NURSE PRACTITIONER
Payer: MEDICAID

## 2019-09-19 LAB
25(OH)D3 SERPL-MCNC: 44 NG/ML (ref 30–100)
ALBUMIN SERPL BCP-MCNC: 3.7 G/DL (ref 3.2–4.9)
ALBUMIN/GLOB SERPL: 1.2 G/DL
ALP SERPL-CCNC: 77 U/L (ref 30–99)
ALT SERPL-CCNC: 17 U/L (ref 2–50)
ANION GAP SERPL CALC-SCNC: 5 MMOL/L (ref 0–11.9)
AST SERPL-CCNC: 16 U/L (ref 12–45)
BASOPHILS # BLD AUTO: 0.4 % (ref 0–1.8)
BASOPHILS # BLD: 0.03 K/UL (ref 0–0.12)
BILIRUB SERPL-MCNC: 0.4 MG/DL (ref 0.1–1.5)
BUN SERPL-MCNC: 8 MG/DL (ref 8–22)
CALCIUM SERPL-MCNC: 8.9 MG/DL (ref 8.5–10.5)
CHLORIDE SERPL-SCNC: 103 MMOL/L (ref 96–112)
CHOLEST SERPL-MCNC: 109 MG/DL (ref 100–199)
CO2 SERPL-SCNC: 26 MMOL/L (ref 20–33)
CREAT SERPL-MCNC: 0.63 MG/DL (ref 0.5–1.4)
EOSINOPHIL # BLD AUTO: 0.03 K/UL (ref 0–0.51)
EOSINOPHIL NFR BLD: 0.4 % (ref 0–6.9)
ERYTHROCYTE [DISTWIDTH] IN BLOOD BY AUTOMATED COUNT: 44.1 FL (ref 35.9–50)
FOLATE SERPL-MCNC: 4.4 NG/ML
GLOBULIN SER CALC-MCNC: 3.2 G/DL (ref 1.9–3.5)
GLUCOSE SERPL-MCNC: 94 MG/DL (ref 65–99)
HCT VFR BLD AUTO: 39.8 % (ref 37–47)
HDLC SERPL-MCNC: 44 MG/DL
HGB BLD-MCNC: 12.7 G/DL (ref 12–16)
IMM GRANULOCYTES # BLD AUTO: 0.01 K/UL (ref 0–0.11)
IMM GRANULOCYTES NFR BLD AUTO: 0.1 % (ref 0–0.9)
IRON SATN MFR SERPL: 18 % (ref 15–55)
IRON SERPL-MCNC: 57 UG/DL (ref 40–170)
LDLC SERPL CALC-MCNC: 50 MG/DL
LYMPHOCYTES # BLD AUTO: 2.53 K/UL (ref 1–4.8)
LYMPHOCYTES NFR BLD: 29.9 % (ref 22–41)
MCH RBC QN AUTO: 29.6 PG (ref 27–33)
MCHC RBC AUTO-ENTMCNC: 31.9 G/DL (ref 33.6–35)
MCV RBC AUTO: 92.8 FL (ref 81.4–97.8)
MONOCYTES # BLD AUTO: 0.44 K/UL (ref 0–0.85)
MONOCYTES NFR BLD AUTO: 5.2 % (ref 0–13.4)
NEUTROPHILS # BLD AUTO: 5.41 K/UL (ref 2–7.15)
NEUTROPHILS NFR BLD: 64 % (ref 44–72)
NRBC # BLD AUTO: 0 K/UL
NRBC BLD-RTO: 0 /100 WBC
PLATELET # BLD AUTO: 278 K/UL (ref 164–446)
PMV BLD AUTO: 12 FL (ref 9–12.9)
POTASSIUM SERPL-SCNC: 3.9 MMOL/L (ref 3.6–5.5)
PREALB SERPL-MCNC: 17 MG/DL (ref 18–38)
PROT SERPL-MCNC: 6.9 G/DL (ref 6–8.2)
RBC # BLD AUTO: 4.29 M/UL (ref 4.2–5.4)
SODIUM SERPL-SCNC: 134 MMOL/L (ref 135–145)
TIBC SERPL-MCNC: 311 UG/DL (ref 250–450)
TRANSFERRIN SERPL-MCNC: 225 MG/DL (ref 200–370)
TRIGL SERPL-MCNC: 75 MG/DL (ref 0–149)
VIT B12 SERPL-MCNC: 755 PG/ML (ref 211–911)
WBC # BLD AUTO: 8.5 K/UL (ref 4.8–10.8)

## 2019-09-19 PROCEDURE — 82607 VITAMIN B-12: CPT

## 2019-09-19 PROCEDURE — 84252 ASSAY OF VITAMIN B-2: CPT

## 2019-09-19 PROCEDURE — 84425 ASSAY OF VITAMIN B-1: CPT

## 2019-09-19 PROCEDURE — 84466 ASSAY OF TRANSFERRIN: CPT

## 2019-09-19 PROCEDURE — 80061 LIPID PANEL: CPT

## 2019-09-19 PROCEDURE — 84207 ASSAY OF VITAMIN B-6: CPT

## 2019-09-19 PROCEDURE — 85025 COMPLETE CBC W/AUTO DIFF WBC: CPT

## 2019-09-19 PROCEDURE — 83540 ASSAY OF IRON: CPT

## 2019-09-19 PROCEDURE — 82306 VITAMIN D 25 HYDROXY: CPT

## 2019-09-19 PROCEDURE — 83550 IRON BINDING TEST: CPT

## 2019-09-19 PROCEDURE — 36415 COLL VENOUS BLD VENIPUNCTURE: CPT

## 2019-09-19 PROCEDURE — 84134 ASSAY OF PREALBUMIN: CPT

## 2019-09-19 PROCEDURE — 82746 ASSAY OF FOLIC ACID SERUM: CPT

## 2019-09-19 PROCEDURE — 80053 COMPREHEN METABOLIC PANEL: CPT

## 2019-09-24 LAB
VIT B1 BLD-MCNC: 123 NMOL/L (ref 70–180)
VIT B2 SERPL-SCNC: 11 NMOL/L (ref 5–50)
VIT B6 SERPL-MCNC: 98.4 NMOL/L (ref 20–125)

## 2019-12-27 ENCOUNTER — OFFICE VISIT (OUTPATIENT)
Dept: URGENT CARE | Facility: PHYSICIAN GROUP | Age: 49
End: 2019-12-27
Payer: MEDICAID

## 2019-12-27 VITALS
DIASTOLIC BLOOD PRESSURE: 82 MMHG | BODY MASS INDEX: 42.13 KG/M2 | HEART RATE: 66 BPM | SYSTOLIC BLOOD PRESSURE: 140 MMHG | RESPIRATION RATE: 18 BRPM | TEMPERATURE: 97.9 F | OXYGEN SATURATION: 97 % | WEIGHT: 269 LBS

## 2019-12-27 DIAGNOSIS — J01.00 ACUTE NON-RECURRENT MAXILLARY SINUSITIS: ICD-10-CM

## 2019-12-27 DIAGNOSIS — F10.10 ETOH ABUSE: ICD-10-CM

## 2019-12-27 DIAGNOSIS — B37.9 ANTIBIOTIC-INDUCED YEAST INFECTION: ICD-10-CM

## 2019-12-27 DIAGNOSIS — T36.95XA ANTIBIOTIC-INDUCED YEAST INFECTION: ICD-10-CM

## 2019-12-27 PROCEDURE — 99214 OFFICE O/P EST MOD 30 MIN: CPT | Performed by: PHYSICIAN ASSISTANT

## 2019-12-27 RX ORDER — FLUCONAZOLE 150 MG/1
150 TABLET ORAL DAILY
Qty: 1 TAB | Refills: 0 | Status: SHIPPED | OUTPATIENT
Start: 2019-12-27 | End: 2020-04-25

## 2019-12-27 RX ORDER — HYDROXYZINE HYDROCHLORIDE 25 MG/1
25 TABLET, FILM COATED ORAL 3 TIMES DAILY PRN
Qty: 30 TAB | Refills: 0 | Status: SHIPPED | OUTPATIENT
Start: 2019-12-27 | End: 2020-04-25

## 2019-12-27 RX ORDER — AMOXICILLIN AND CLAVULANATE POTASSIUM 875; 125 MG/1; MG/1
1 TABLET, FILM COATED ORAL 2 TIMES DAILY
Qty: 20 TAB | Refills: 0 | Status: SHIPPED | OUTPATIENT
Start: 2019-12-27 | End: 2020-04-25

## 2019-12-27 SDOH — HEALTH STABILITY: MENTAL HEALTH: HOW OFTEN DO YOU HAVE 6 OR MORE DRINKS ON ONE OCCASION?: DAILY OR ALMOST DAILY

## 2019-12-27 SDOH — HEALTH STABILITY: MENTAL HEALTH: HOW OFTEN DO YOU HAVE A DRINK CONTAINING ALCOHOL?: 4 OR MORE TIMES A WEEK

## 2019-12-27 NOTE — PROGRESS NOTES
Chief Complaint   Patient presents with   • Sinusitis     ear ache, tube in right ear   • ETOH Withdrawal     last drink was yesterday        HISTORY OF PRESENT ILLNESS: Patient is a 49 y.o. female who presents today for the following:    Sinus pressure x 10 days  Right ear pain without drainage; tube in right ear only  Denies fever, SOB, cough  OTC meds tried: alkaseltzer, not helping  Feeling same/slightly worse    Drinks pint whiskey daily; nothing since 1600 yesterday  + Nausea  Denies vomiting, diarrhea  Denies shaking/tremors    Patient Active Problem List    Diagnosis Date Noted   • Fracture of left humerus 03/01/2019     Priority: High   • Tobacco use 03/01/2019     Priority: Medium   • Obesity 08/29/2012     Priority: Medium   • Alcoholism ([facility]) 03/01/2019     Priority: Low   • Hypertension 03/01/2019     Priority: Low   • Gastroesophageal reflux disease without esophagitis 03/01/2019     Priority: Low   • Depression 08/29/2012     Priority: Low   • Morbid obesity with BMI of 45.0-49.9, adult ([facility]) 12/20/2017   • Stenosis of esophagus 05/17/2017   • Morbid obesity ([facility]) 02/03/2016   • Tear of medial cartilage or meniscus of knee, current 12/10/2014   • Central perforation of tympanic membrane 11/06/2012   • Ruptured ear drum 08/29/2012   • Pedal edema 08/29/2012       Allergies:Aspirin; Other misc; and Nsaids    Current Outpatient Medications Ordered in Epic   Medication Sig Dispense Refill   • amoxicillin-clavulanate (AUGMENTIN) 875-125 MG Tab Take 1 Tab by mouth 2 times a day. 20 Tab 0   • fluconazole (DIFLUCAN) 150 MG tablet Take 1 Tab by mouth every day. 1 Tab 0   • hydrOXYzine HCl (ATARAX) 25 MG Tab Take 1 Tab by mouth 3 times a day as needed for Itching or Anxiety. 30 Tab 0   • omeprazole (PRILOSEC) 20 MG delayed-release capsule Take 20 mg by mouth 2 times a day as needed.     • lisinopril-hydrochlorothiazide (PRINZIDE, ZESTORETIC) 20-12.5 MG per tablet Take 1 Tab by mouth every morning.     •  citalopram (CELEXA) 40 MG Tab Take 40 mg by mouth every day.     • tramadol (ULTRAM) 50 MG Tab Take 50 mg by mouth every 8 hours as needed for Moderate Pain.     • HYDROcodone-acetaminophen (NORCO) 5-325 MG Tab per tablet Take 0.5-1 Tabs by mouth every 6 hours as needed.     • acetaminophen (TYLENOL) 650 MG CR tablet Take 1,300 mg by mouth every 6 hours as needed for Moderate Pain.     • TURMERIC PO Take 2 Caps by mouth every day.       No current [facility]-ordered facility-administered medications on file.        Past Medical History:   Diagnosis Date   • Anemia     during pregnancy   • Anxiety    • Bowel habit changes     constipation   • Cancer (HCC)     uterine   • Cold    • Dental disorder     lower partial   • Gynecological disorder     menorrhagia   • Heart burn    • Hiatus hernia syndrome    • Hypertension    • Pain 2014    left knee   • Psychiatric problem     anxiety/depression       Social History     Tobacco Use   • Smoking status: Former Smoker     Packs/day: 1.00     Years: 12.00     Pack years: 12.00     Types: Cigarettes     Last attempt to quit: 2019     Years since quittin.8   • Smokeless tobacco: Never Used   • Tobacco comment: 2 yrs   ppd   Substance Use Topics   • Alcohol use: Yes     Frequency: 4 or more times a week     Binge frequency: Daily or almost daily     Comment: 1 pint of whiskey nightly    • Drug use: No     Comment: meth  none since        Family Status   Relation Name Status   • Mo  Alive   • Fa  Alive     Family History   Problem Relation Age of Onset   • Breast Cancer Father        Review of Systems:   Constitutional ROS: No unexpected change in weight, No weakness, No fatigue  Eye ROS: No recent significant change in vision, No eye pain, redness, discharge  Ear ROS: No drainage, No tinnitus or vertigo, No recent change in hearing  Mouth/Throat ROS: No teeth or gum problems, No bleeding gums, No tongue complaints  Neck ROS: No swollen glands, No significant pain in  neck  Pulmonary ROS: No chronic cough, sputum, or hemoptysis, No dyspnea on exertion, No wheezing  Cardiovascular ROS: No diaphoresis, No edema, No palpitations  Musculoskeletal/Extremities ROS: No peripheral edema, No pain, redness or swelling on the joints  Hematologic/Lymphatic ROS: No chills, No night sweats, No weight loss  Skin/Integumentary ROS: No edema, No evidence of rash, No itching      Exam:  /82   Pulse 66   Temp 36.6 °C (97.9 °F)   Resp 18   Wt 122 kg (269 lb)   SpO2 97%   General: Well developed, well nourished. No distress.  No tremors noted.  Eye: PERRL/EOMI; conjunctivae clear, lids normal.  ENMT: Lips without lesions, MMM. Oropharynx is clear.  Blue T tube noted in the right TM without erythema and drainage.  Left TM is without erythema and drainage but with a perforation noted centrally.  Pulmonary: Unlabored respiratory effort. Lungs clear to auscultation, no wheezes, no rhonchi.    Cardiovascular: Regular rate and rhythm without murmur.   Neurologic: Grossly nonfocal. No facial asymmetry noted.  Lymph: No cervical lymphadenopathy noted.  Skin: Warm, dry, good turgor. No rashes in visible areas.   Psych: Normal mood. Alert and oriented to person, place and time.    Assessment/Plan:  Use all medication as directed.. Discussed appropriate over-the-counter symptomatic medication, and when to return to clinic.     Discussed strict ER precautions for alcohol withdrawal and encouraged her to seek inpatient treatment or at least intensive outpatient counseling.      Follow up for worsening or persistent symptoms.  1. Acute non-recurrent maxillary sinusitis  amoxicillin-clavulanate (AUGMENTIN) 875-125 MG Tab   2. Antibiotic-induced yeast infection  fluconazole (DIFLUCAN) 150 MG tablet   3. ETOH abuse  hydrOXYzine HCl (ATARAX) 25 MG Tab

## 2020-01-02 ENCOUNTER — HOSPITAL ENCOUNTER (OUTPATIENT)
Dept: LAB | Facility: MEDICAL CENTER | Age: 50
End: 2020-01-02
Attending: NURSE PRACTITIONER
Payer: MEDICAID

## 2020-01-02 ENCOUNTER — OFFICE VISIT (OUTPATIENT)
Dept: URGENT CARE | Facility: PHYSICIAN GROUP | Age: 50
End: 2020-01-02
Payer: MEDICAID

## 2020-01-02 VITALS
RESPIRATION RATE: 18 BRPM | TEMPERATURE: 98.6 F | OXYGEN SATURATION: 98 % | HEART RATE: 56 BPM | SYSTOLIC BLOOD PRESSURE: 132 MMHG | BODY MASS INDEX: 41.97 KG/M2 | DIASTOLIC BLOOD PRESSURE: 88 MMHG | WEIGHT: 268 LBS

## 2020-01-02 DIAGNOSIS — J32.9 RHINOSINUSITIS: ICD-10-CM

## 2020-01-02 LAB
25(OH)D3 SERPL-MCNC: 31 NG/ML (ref 30–100)
ALBUMIN SERPL BCP-MCNC: 3.4 G/DL (ref 3.2–4.9)
ALBUMIN/GLOB SERPL: 1 G/DL
ALP SERPL-CCNC: 82 U/L (ref 30–99)
ALT SERPL-CCNC: 13 U/L (ref 2–50)
ANION GAP SERPL CALC-SCNC: 6 MMOL/L (ref 0–11.9)
AST SERPL-CCNC: 16 U/L (ref 12–45)
BASOPHILS # BLD AUTO: 0.7 % (ref 0–1.8)
BASOPHILS # BLD: 0.03 K/UL (ref 0–0.12)
BILIRUB SERPL-MCNC: 0.4 MG/DL (ref 0.1–1.5)
BUN SERPL-MCNC: 7 MG/DL (ref 8–22)
CALCIUM SERPL-MCNC: 8.6 MG/DL (ref 8.5–10.5)
CHLORIDE SERPL-SCNC: 104 MMOL/L (ref 96–112)
CHOLEST SERPL-MCNC: 103 MG/DL (ref 100–199)
CO2 SERPL-SCNC: 29 MMOL/L (ref 20–33)
CREAT SERPL-MCNC: 0.66 MG/DL (ref 0.5–1.4)
EOSINOPHIL # BLD AUTO: 0.04 K/UL (ref 0–0.51)
EOSINOPHIL NFR BLD: 1 % (ref 0–6.9)
ERYTHROCYTE [DISTWIDTH] IN BLOOD BY AUTOMATED COUNT: 42.3 FL (ref 35.9–50)
FASTING STATUS PATIENT QL REPORTED: NORMAL
GLOBULIN SER CALC-MCNC: 3.3 G/DL (ref 1.9–3.5)
GLUCOSE SERPL-MCNC: 57 MG/DL (ref 65–99)
HCT VFR BLD AUTO: 39.3 % (ref 37–47)
HDLC SERPL-MCNC: 40 MG/DL
HGB BLD-MCNC: 13.4 G/DL (ref 12–16)
IMM GRANULOCYTES # BLD AUTO: 0.01 K/UL (ref 0–0.11)
IMM GRANULOCYTES NFR BLD AUTO: 0.2 % (ref 0–0.9)
IRON SATN MFR SERPL: 28 % (ref 15–55)
IRON SERPL-MCNC: 85 UG/DL (ref 40–170)
LDLC SERPL CALC-MCNC: 48 MG/DL
LYMPHOCYTES # BLD AUTO: 2.19 K/UL (ref 1–4.8)
LYMPHOCYTES NFR BLD: 52.5 % (ref 22–41)
MCH RBC QN AUTO: 31.4 PG (ref 27–33)
MCHC RBC AUTO-ENTMCNC: 34.1 G/DL (ref 33.6–35)
MCV RBC AUTO: 92 FL (ref 81.4–97.8)
MONOCYTES # BLD AUTO: 0.27 K/UL (ref 0–0.85)
MONOCYTES NFR BLD AUTO: 6.5 % (ref 0–13.4)
NEUTROPHILS # BLD AUTO: 1.63 K/UL (ref 2–7.15)
NEUTROPHILS NFR BLD: 39.1 % (ref 44–72)
NRBC # BLD AUTO: 0 K/UL
NRBC BLD-RTO: 0 /100 WBC
PLATELET # BLD AUTO: 222 K/UL (ref 164–446)
PMV BLD AUTO: 11.2 FL (ref 9–12.9)
POTASSIUM SERPL-SCNC: 4.3 MMOL/L (ref 3.6–5.5)
PREALB SERPL-MCNC: 17 MG/DL (ref 18–38)
PROT SERPL-MCNC: 6.7 G/DL (ref 6–8.2)
RBC # BLD AUTO: 4.27 M/UL (ref 4.2–5.4)
SODIUM SERPL-SCNC: 139 MMOL/L (ref 135–145)
TIBC SERPL-MCNC: 309 UG/DL (ref 250–450)
TRANSFERRIN SERPL-MCNC: 221 MG/DL (ref 200–370)
TRIGL SERPL-MCNC: 75 MG/DL (ref 0–149)
WBC # BLD AUTO: 4.2 K/UL (ref 4.8–10.8)

## 2020-01-02 PROCEDURE — 84134 ASSAY OF PREALBUMIN: CPT

## 2020-01-02 PROCEDURE — 80053 COMPREHEN METABOLIC PANEL: CPT

## 2020-01-02 PROCEDURE — 83540 ASSAY OF IRON: CPT

## 2020-01-02 PROCEDURE — 80061 LIPID PANEL: CPT

## 2020-01-02 PROCEDURE — 84252 ASSAY OF VITAMIN B-2: CPT

## 2020-01-02 PROCEDURE — 36415 COLL VENOUS BLD VENIPUNCTURE: CPT

## 2020-01-02 PROCEDURE — 82607 VITAMIN B-12: CPT

## 2020-01-02 PROCEDURE — 99214 OFFICE O/P EST MOD 30 MIN: CPT | Performed by: PHYSICIAN ASSISTANT

## 2020-01-02 PROCEDURE — 84466 ASSAY OF TRANSFERRIN: CPT

## 2020-01-02 PROCEDURE — 84207 ASSAY OF VITAMIN B-6: CPT

## 2020-01-02 PROCEDURE — 82746 ASSAY OF FOLIC ACID SERUM: CPT

## 2020-01-02 PROCEDURE — 82306 VITAMIN D 25 HYDROXY: CPT

## 2020-01-02 PROCEDURE — 83550 IRON BINDING TEST: CPT

## 2020-01-02 PROCEDURE — 84425 ASSAY OF VITAMIN B-1: CPT

## 2020-01-02 PROCEDURE — 85025 COMPLETE CBC W/AUTO DIFF WBC: CPT

## 2020-01-02 RX ORDER — METHYLPREDNISOLONE 4 MG/1
TABLET ORAL
Qty: 21 TAB | Refills: 0 | Status: SHIPPED | OUTPATIENT
Start: 2020-01-02 | End: 2020-04-25

## 2020-01-02 NOTE — PROGRESS NOTES
Chief Complaint   Patient presents with   • Sinus Problem     fv fromrecent visit for ear pain and sinus infection-still present (R) ear still very painful        HISTORY OF PRESENT ILLNESS: Patient is a 49 y.o. female who presents today for the following:    Patient comes in for reevaluation of sinusitis.  She was seen 12/27/2019 for the same after having 10-day history of green sinus drainage with facial pressure and ear pain.  She was put on Augmentin at that time but has not had any significant change in symptoms.  She has appointment with her ENT but not for 3 weeks.  She states that her nasal drainage is gone from green to yellow but continues to have headaches and facial pressure.  She denies any fever.    Patient Active Problem List    Diagnosis Date Noted   • Fracture of left humerus 03/01/2019     Priority: High   • Tobacco use 03/01/2019     Priority: Medium   • Obesity 08/29/2012     Priority: Medium   • Alcoholism ([facility]) 03/01/2019     Priority: Low   • Hypertension 03/01/2019     Priority: Low   • Gastroesophageal reflux disease without esophagitis 03/01/2019     Priority: Low   • Depression 08/29/2012     Priority: Low   • Morbid obesity with BMI of 45.0-49.9, adult ([facility]) 12/20/2017   • Stenosis of esophagus 05/17/2017   • Morbid obesity ([facility]) 02/03/2016   • Tear of medial cartilage or meniscus of knee, current 12/10/2014   • Central perforation of tympanic membrane 11/06/2012   • Ruptured ear drum 08/29/2012   • Pedal edema 08/29/2012       Allergies:Aspirin; Other misc; and Nsaids    Current Outpatient Medications Ordered in Epic   Medication Sig Dispense Refill   • methylPREDNISolone (MEDROL DOSEPAK) 4 MG Tablet Therapy Pack Use as package directs 21 Tab 0   • amoxicillin-clavulanate (AUGMENTIN) 875-125 MG Tab Take 1 Tab by mouth 2 times a day. 20 Tab 0   • fluconazole (DIFLUCAN) 150 MG tablet Take 1 Tab by mouth every day. 1 Tab 0   • hydrOXYzine HCl (ATARAX) 25 MG Tab Take 1 Tab by mouth 3 times a  day as needed for Itching or Anxiety. 30 Tab 0   • tramadol (ULTRAM) 50 MG Tab Take 50 mg by mouth every 8 hours as needed for Moderate Pain.     • HYDROcodone-acetaminophen (NORCO) 5-325 MG Tab per tablet Take 0.5-1 Tabs by mouth every 6 hours as needed.     • acetaminophen (TYLENOL) 650 MG CR tablet Take 1,300 mg by mouth every 6 hours as needed for Moderate Pain.     • TURMERIC PO Take 2 Caps by mouth every day.     • omeprazole (PRILOSEC) 20 MG delayed-release capsule Take 20 mg by mouth 2 times a day as needed.     • lisinopril-hydrochlorothiazide (PRINZIDE, ZESTORETIC) 20-12.5 MG per tablet Take 1 Tab by mouth every morning.     • citalopram (CELEXA) 40 MG Tab Take 40 mg by mouth every day.       No current [facility]-ordered facility-administered medications on file.        Past Medical History:   Diagnosis Date   • Anemia     during pregnancy   • Anxiety    • Bowel habit changes     constipation   • Cancer (HCC) 2009    uterine   • Cold    • Dental disorder     lower partial   • Gynecological disorder     menorrhagia   • Heart burn    • Hiatus hernia syndrome    • Hypertension    • Pain 2014    left knee   • Psychiatric problem     anxiety/depression       Social History     Tobacco Use   • Smoking status: Former Smoker     Packs/day: 1.00     Years: 12.00     Pack years: 12.00     Types: Cigarettes     Last attempt to quit: 2019     Years since quittin.8   • Smokeless tobacco: Never Used   • Tobacco comment: 2 yrs   ppd   Substance Use Topics   • Alcohol use: Yes     Frequency: 4 or more times a week     Binge frequency: Daily or almost daily     Comment: 1 pint of whiskey nightly    • Drug use: No     Comment: meth  none since        Family Status   Relation Name Status   • Mo  Alive   • Fa  Alive     Family History   Problem Relation Age of Onset   • Breast Cancer Father        Review of Systems:   Constitutional ROS: No unexpected change in weight, No weakness, No fatigue  Eye ROS: No recent  significant change in vision, No eye pain, redness, discharge  Ear ROS: No drainage, No tinnitus or vertigo, No recent change in hearing  Mouth/Throat ROS: No teeth or gum problems, No bleeding gums, No tongue complaints  Neck ROS: No swollen glands, No significant pain in neck  Pulmonary ROS: No chronic cough, sputum, or hemoptysis, No dyspnea on exertion, No wheezing  Cardiovascular ROS: No diaphoresis, No edema, No palpitations  Musculoskeletal/Extremities ROS: No peripheral edema, No pain, redness or swelling on the joints  Hematologic/Lymphatic ROS: No chills, No night sweats, No weight loss  Skin/Integumentary ROS: No edema, No evidence of rash, No itching      Exam:  /88   Pulse (!) 56   Temp 37 °C (98.6 °F)   Resp 18   Wt 121.6 kg (268 lb)   SpO2 98%   General: Well developed, well nourished. No distress.    Eye: PERRL/EOMI; conjunctivae clear, lids normal.  ENMT: Lips without lesions, MMM. Oropharynx is clear. Blue T tube noted in the right TM without erythema and drainage.  Left TM is without erythema and drainage but with a perforation noted centrally.  Nasal mucosa is markedly edematous bilaterally, right worse than left.  Pulmonary: Unlabored respiratory effort.    Neurologic: Grossly nonfocal. No facial asymmetry noted.  Skin: Warm, dry, good turgor. No rashes in visible areas.   Psych: Normal mood. Alert and oriented to person, place and time.    Assessment/Plan:  Discussed with patient that if this is a bacterial infection I would expect Augmentin to be improving her symptoms.  Her drainage has gone from green to yellow which may be slow improvement.  We will have patient try steroids to see if this helps decrease some the inflammation that may be causing her pain.  Follow-up with ENT in 3 weeks as scheduled.  Follow up for worsening or persistent symptoms.  1. Rhinosinusitis  methylPREDNISolone (MEDROL DOSEPAK) 4 MG Tablet Therapy Pack

## 2020-01-03 LAB
FOLATE SERPL-MCNC: 3.8 NG/ML
VIT B12 SERPL-MCNC: 414 PG/ML (ref 211–911)

## 2020-01-05 LAB
VIT B1 BLD-MCNC: 133 NMOL/L (ref 70–180)
VIT B6 SERPL-MCNC: 90.6 NMOL/L (ref 20–125)

## 2020-01-06 LAB — VIT B2 SERPL-SCNC: 15 NMOL/L (ref 5–50)

## 2020-01-21 ENCOUNTER — OFFICE VISIT (OUTPATIENT)
Dept: URGENT CARE | Facility: PHYSICIAN GROUP | Age: 50
End: 2020-01-21
Payer: MEDICAID

## 2020-01-21 VITALS
OXYGEN SATURATION: 98 % | SYSTOLIC BLOOD PRESSURE: 128 MMHG | TEMPERATURE: 98.3 F | WEIGHT: 268 LBS | DIASTOLIC BLOOD PRESSURE: 80 MMHG | BODY MASS INDEX: 42.06 KG/M2 | RESPIRATION RATE: 16 BRPM | HEIGHT: 67 IN | HEART RATE: 72 BPM

## 2020-01-21 DIAGNOSIS — H66.92 OTITIS MEDIA WITH RUPTURE OF TYMPANIC MEMBRANE, LEFT: ICD-10-CM

## 2020-01-21 DIAGNOSIS — H72.92 OTITIS MEDIA WITH RUPTURE OF TYMPANIC MEMBRANE, LEFT: ICD-10-CM

## 2020-01-21 DIAGNOSIS — Z87.09 HISTORY OF SINUSITIS: ICD-10-CM

## 2020-01-21 PROCEDURE — 99214 OFFICE O/P EST MOD 30 MIN: CPT | Performed by: PHYSICIAN ASSISTANT

## 2020-01-21 RX ORDER — OFLOXACIN 3 MG/ML
SOLUTION/ DROPS OPHTHALMIC
Qty: 1 BOTTLE | Refills: 0 | Status: SHIPPED | OUTPATIENT
Start: 2020-01-21 | End: 2020-04-25

## 2020-01-21 RX ORDER — CEFDINIR 300 MG/1
300 CAPSULE ORAL 2 TIMES DAILY
Qty: 14 CAP | Refills: 0 | Status: SHIPPED | OUTPATIENT
Start: 2020-01-21 | End: 2020-01-28

## 2020-01-21 ASSESSMENT — ENCOUNTER SYMPTOMS
COUGH: 0
DIARRHEA: 0
EYE DISCHARGE: 0
HEADACHES: 0
VOMITING: 0
RHINORRHEA: 1
WHEEZING: 0
SORE THROAT: 0
CHILLS: 0
DIZZINESS: 0
FEVER: 0
EYE REDNESS: 0

## 2020-01-21 NOTE — PROGRESS NOTES
"Subjective:      Nneka Ruiz is a 49 y.o. female who presents with Jaw Pain and Otalgia            Patient is a 49-year-old female who presents to urgent care with significant left ear pain particular last night however she felt a pop and then no relief of pressure with notable improvement today.  She does mention that she saw some drainage from her ear last night as well and believes she may have ruptured her eardrum.    Otalgia    There is pain in the left ear. This is a new problem. The current episode started in the past 7 days (Worse last night, then improved this morning. ). The problem has been waxing and waning. There has been no fever. Associated symptoms include ear discharge, hearing loss and rhinorrhea. Pertinent negatives include no coughing, diarrhea, headaches, rash, sore throat or vomiting. Treatments tried: Prior use of Augmentin, and steroids. The treatment provided moderate relief.       Review of Systems   Constitutional: Negative for chills and fever.   HENT: Positive for congestion, ear discharge, ear pain, hearing loss and rhinorrhea. Negative for sore throat.    Eyes: Negative for discharge and redness.   Respiratory: Negative for cough and wheezing.    Gastrointestinal: Negative for diarrhea and vomiting.   Skin: Negative for rash.   Neurological: Negative for dizziness and headaches.   All other systems reviewed and are negative.         Objective:     /80 (BP Location: Right arm, Patient Position: Sitting, BP Cuff Size: Large adult)   Pulse 72   Temp 36.8 °C (98.3 °F) (Temporal)   Resp 16   Ht 1.702 m (5' 7\")   Wt 121.6 kg (268 lb)   SpO2 98%   BMI 41.97 kg/m²    PMH:  has a past medical history of Anemia, Anxiety, Bowel habit changes, Cancer (HCC) (2009), Cold, Dental disorder, Gynecological disorder, Heart burn, Hiatus hernia syndrome, Hypertension, Pain (2014), and Psychiatric problem. She also has no past medical history of CAD (coronary artery disease), COPD, " "Liver disease, or Sickle cell disease (HCC).  MEDS: Reviewed .   ALLERGIES:   Allergies   Allergen Reactions   • Aspirin Vomiting   • Other Misc      Waterproof band aids took off skin and caused infection    • Nsaids Unspecified     Pt states not allergic to ALL nsaids, just \"not supposed to take them after gastric sleeve surgery\"     SURGHX:   Past Surgical History:   Procedure Laterality Date   • GASTRIC BYPASS LAPAROSCOPIC  3/20/2019    Procedure: GASTRIC BYPASS LAPAROSCOPIC- REVISION OF GASTRIC RESTRICTION WITH SINGLE ANASTAMOSIS DUODENAL- INTESTINAL BYPASS;  Surgeon: Keegan Hernandez M.D.;  Location: SURGERY [facility];  Service: General   • HIATAL HERNIA REPAIR  3/20/2019    Procedure: HIATAL HERNIA REPAIR- POSSIBLE OPEN;  Surgeon: Keegan Hernandez M.D.;  Location: SURGERY [facility];  Service: General   • HUMERUS ORIF Left 3/1/2019    Procedure: HUMERUS ORIF;  Surgeon: Bandar Block M.D.;  Location: SURGERY [facility];  Service: Orthopedics   • GASTROSCOPY  5/17/2017    Procedure: GASTROSCOPY W/DILATATION, 30-MM ACHALASIA;  Surgeon: Keegan Hernandez M.D.;  Location: SURGERY [facility];  Service:    • GASTRIC SLEEVE LAPAROSCOPY N/A 2/3/2016    Procedure: GASTRIC SLEEVE LAPAROSCOPY AND LIVER BIOPSY;  Surgeon: Keegan Hernandez M.D.;  Location: SURGERY [facility];  Service:    • KNEE ARTHROSCOPY  12/10/2014    Performed by Mikel Spencer M.D. at [facility]   • MEDIAL MENISCECTOMY  12/10/2014    Performed by Mikel Spencer M.D. at [facility]   • MENISCECTOMY  12/10/2014    Performed by Mikel Spencer M.D. at [facility]   • LATERAL RELEASE  12/10/2014    Performed by Mikel Spencer M.D. at [facility]   • TYMPANOPLASTY  11/6/2012    Performed by Jonn Kaufamn M.D. at SURGERY [facility]   • GYN SURGERY      D&C-IUD   • TONSILLECTOMY     • TUBAL LIGATION       SOCHX:  reports that she quit smoking about 11 months " ago. Her smoking use included cigarettes. She has a 12.00 pack-year smoking history. She has never used smokeless tobacco. She reports current alcohol use. She reports that she does not use drugs.  FH: Family history was reviewed, no pertinent findings to report    Physical Exam  Vitals signs reviewed.   Constitutional:       Appearance: Normal appearance. She is well-developed.   HENT:      Head: Normocephalic and atraumatic.      Ears:      Comments: Left TM with small central TM perforation with surrounding erythema and minimal drainage in the auditory canal.  Auricle and tragus along with mastoid all nontender.  Right TM tube is patent without surrounding erythema.     Nose:      Comments: Rhinorrhea noted.     Mouth/Throat:      Comments: Posterior oropharynx is erythematous, positive postnasal drainage.  No evidence of exudate.  Eyes:      Conjunctiva/sclera: Conjunctivae normal.      Pupils: Pupils are equal, round, and reactive to light.   Neck:      Musculoskeletal: Normal range of motion and neck supple.   Cardiovascular:      Rate and Rhythm: Normal rate and regular rhythm.      Heart sounds: No murmur.   Pulmonary:      Effort: Pulmonary effort is normal. No respiratory distress.      Breath sounds: Normal breath sounds.   Musculoskeletal: Normal range of motion.      Right lower leg: No edema.      Left lower leg: No edema.   Lymphadenopathy:      Cervical: No cervical adenopathy.   Skin:     General: Skin is warm.      Findings: No rash.   Neurological:      Mental Status: She is alert and oriented to person, place, and time.   Psychiatric:         Mood and Affect: Mood normal.         Behavior: Behavior normal.         Thought Content: Thought content normal.                 Assessment/Plan:       1. Otitis media with rupture of tympanic membrane, left  - cefdinir (OMNICEF) 300 MG Cap; Take 1 Cap by mouth 2 times a day for 7 days.  Dispense: 14 Cap; Refill: 0  - ofloxacin (OCUFLOX) 0.3 % Solution;  Place 10 gtts to left ear BID for 10 days.  Dispense: 1 Bottle; Refill: 0    2. History of sinusitis  - cefdinir (OMNICEF) 300 MG Cap; Take 1 Cap by mouth 2 times a day for 7 days.  Dispense: 14 Cap; Refill: 0    Patient reports history of being on cephalexin for similar event in the past of which she was more successful with then prior Augmentin.  Placed the patient on Omnicef-as frequency is less.  Also will write for drops patient is to avoid submersion of her head.  She does have upcoming appointment in a few weeks with ENT.  Patient and/or guardian given precautionary s/sx that mandate immediate follow up and evaluation in the ED. Advised of risks of not doing so.    Side effects of the above medications discussed.   DDX, Supportive care, and indications for immediate follow-up discussed with patient.    Instructed to return to clinic or nearest emergency department if we are not available for any change in condition, further concerns, or worsening of symptoms.    The patient and/or guardian demonstrated a good understanding and agreed with the treatment plan.    Please note that this dictation was created using voice recognition software. I have made every reasonable attempt to correct obvious errors, but I expect that there are errors of grammar and possibly content that I did not discover before finalizing the note.

## 2020-02-03 ENCOUNTER — APPOINTMENT (OUTPATIENT)
Dept: RADIOLOGY | Facility: MEDICAL CENTER | Age: 50
End: 2020-02-03
Attending: FAMILY MEDICINE
Payer: MEDICAID

## 2020-02-03 DIAGNOSIS — Z12.31 VISIT FOR SCREENING MAMMOGRAM: ICD-10-CM

## 2020-02-04 ENCOUNTER — HOSPITAL ENCOUNTER (OUTPATIENT)
Dept: RADIOLOGY | Facility: MEDICAL CENTER | Age: 50
End: 2020-02-04

## 2020-04-25 ENCOUNTER — OFFICE VISIT (OUTPATIENT)
Dept: URGENT CARE | Facility: PHYSICIAN GROUP | Age: 50
End: 2020-04-25
Payer: MEDICAID

## 2020-04-25 VITALS
OXYGEN SATURATION: 97 % | DIASTOLIC BLOOD PRESSURE: 84 MMHG | BODY MASS INDEX: 42.76 KG/M2 | HEART RATE: 77 BPM | WEIGHT: 273 LBS | RESPIRATION RATE: 14 BRPM | TEMPERATURE: 97.5 F | SYSTOLIC BLOOD PRESSURE: 126 MMHG

## 2020-04-25 DIAGNOSIS — H66.002 ACUTE SUPPURATIVE OTITIS MEDIA OF LEFT EAR WITHOUT SPONTANEOUS RUPTURE OF TYMPANIC MEMBRANE, RECURRENCE NOT SPECIFIED: ICD-10-CM

## 2020-04-25 PROCEDURE — 99214 OFFICE O/P EST MOD 30 MIN: CPT | Performed by: PHYSICIAN ASSISTANT

## 2020-04-25 RX ORDER — AMOXICILLIN AND CLAVULANATE POTASSIUM 875; 125 MG/1; MG/1
1 TABLET, FILM COATED ORAL 2 TIMES DAILY
Qty: 14 TAB | Refills: 0 | Status: SHIPPED | OUTPATIENT
Start: 2020-04-25 | End: 2021-02-26

## 2020-04-25 ASSESSMENT — FIBROSIS 4 INDEX: FIB4 SCORE: 0.94

## 2020-04-25 NOTE — PROGRESS NOTES
Chief Complaint   Patient presents with   • Earache     L ear        HISTORY OF PRESENT ILLNESS: Patient is a 49 y.o. female who presents today for the following:    Patient comes in for evaluation of left ear pain.  It started yesterday when her ear popped.  She is not sure if she had any drainage because she was in the tub at the time.  She has a history of bad ears requiring tubes.  She finished amoxicillin 500 mg 3 times daily yesterday for a tooth issue.  She denies any fevers.    Patient Active Problem List    Diagnosis Date Noted   • Fracture of left humerus 03/01/2019     Priority: High   • Tobacco use 03/01/2019     Priority: Medium   • Obesity 08/29/2012     Priority: Medium   • Alcoholism ([facility]) 03/01/2019     Priority: Low   • Hypertension 03/01/2019     Priority: Low   • Gastroesophageal reflux disease without esophagitis 03/01/2019     Priority: Low   • Depression 08/29/2012     Priority: Low   • Morbid obesity with BMI of 45.0-49.9, adult ([facility]) 12/20/2017   • Stenosis of esophagus 05/17/2017   • Morbid obesity ([facility]) 02/03/2016   • Tear of medial cartilage or meniscus of knee, current 12/10/2014   • Central perforation of tympanic membrane 11/06/2012   • Ruptured ear drum 08/29/2012   • Pedal edema 08/29/2012       Allergies:Aspirin; Other misc; and Nsaids    Current Outpatient Medications Ordered in Epic   Medication Sig Dispense Refill   • amoxicillin-clavulanate (AUGMENTIN) 875-125 MG Tab Take 1 Tab by mouth 2 times a day. 14 Tab 0   • TURMERIC PO Take 2 Caps by mouth every day.     • omeprazole (PRILOSEC) 20 MG delayed-release capsule Take 20 mg by mouth 2 times a day as needed.     • lisinopril-hydrochlorothiazide (PRINZIDE, ZESTORETIC) 20-12.5 MG per tablet Take 1 Tab by mouth every morning.     • citalopram (CELEXA) 40 MG Tab Take 40 mg by mouth every day.       No current [facility]-ordered facility-administered medications on file.        Past Medical History:   Diagnosis Date   • Anemia     during  pregnancy   • Anxiety    • Bowel habit changes     constipation   • Cancer (HCC)     uterine   • Cold    • Dental disorder     lower partial   • Gynecological disorder     menorrhagia   • Heart burn    • Hiatus hernia syndrome    • Hypertension    • Pain 2014    left knee   • Psychiatric problem     anxiety/depression       Social History     Tobacco Use   • Smoking status: Former Smoker     Packs/day: 1.00     Years: 12.00     Pack years: 12.00     Types: Cigarettes     Last attempt to quit: 2019     Years since quittin.2   • Smokeless tobacco: Never Used   • Tobacco comment: 2 yrs   ppd   Substance Use Topics   • Alcohol use: Yes     Frequency: 4 or more times a week     Binge frequency: Daily or almost daily     Comment: 1 pint of whiskey nightly    • Drug use: No     Comment: meth  none since        Family Status   Relation Name Status   • Mo  Alive   • Fa  Alive     Family History   Problem Relation Age of Onset   • Breast Cancer Father        Review of Systems:   Constitutional ROS: No unexpected change in weight, No weakness, No fatigue  Eye ROS: No recent significant change in vision, No eye pain, redness, discharge  Ear ROS: Left ear pain.  Mouth/Throat ROS: No teeth or gum problems, No bleeding gums, No tongue complaints  Neck ROS: No swollen glands, No significant pain in neck  Pulmonary ROS: No chronic cough, sputum, or hemoptysis, No dyspnea on exertion, No wheezing  Cardiovascular ROS: No diaphoresis, No edema, No palpitations  Musculoskeletal/Extremities ROS: No peripheral edema, No pain, redness or swelling on the joints  Hematologic/Lymphatic ROS: No chills, No night sweats, No weight loss  Skin/Integumentary ROS: No edema, No evidence of rash, No itching      Exam:  /84   Pulse 77   Temp 36.4 °C (97.5 °F) (Temporal)   Resp 14   Wt 123.8 kg (273 lb)   SpO2 97%   General: Well developed, well nourished. No distress.    Eye: PERRL/EOMI; conjunctivae clear, lids  normal.  ENMT: Lips without lesions, MMM. Oropharynx is clear.  Only able to visualize approximately 20% of the left TM and it does appear to be erythematous with what appears to be a purulent effusion.  The rest is obscured by cerumen.  No drainage is noted in the EAC.  Pulmonary: Unlabored respiratory effort.    Neurologic: Grossly nonfocal. No facial asymmetry noted.  Skin: Warm, dry, good turgor. No rashes in visible areas.   Psych: Normal mood. Alert and oriented to person, place and time.    Assessment/Plan:  Unable to visualize the full left TM but what is visualized appears to be infected.  We will have patient try fluticasone nasal spray and chlorpheniramine tablets over the next 5 to 7 days but have provided contingent antibiotics for worsening or persistent symptoms.  Follow up for worsening or persistent symptoms.  1. Acute suppurative otitis media of left ear without spontaneous rupture of tympanic membrane, recurrence not specified  amoxicillin-clavulanate (AUGMENTIN) 875-125 MG Tab

## 2020-05-30 ENCOUNTER — OFFICE VISIT (OUTPATIENT)
Dept: URGENT CARE | Facility: PHYSICIAN GROUP | Age: 50
End: 2020-05-30
Payer: MEDICAID

## 2020-05-30 VITALS
BODY MASS INDEX: 40.81 KG/M2 | OXYGEN SATURATION: 97 % | TEMPERATURE: 97.8 F | HEART RATE: 75 BPM | HEIGHT: 67 IN | WEIGHT: 260 LBS | RESPIRATION RATE: 16 BRPM | SYSTOLIC BLOOD PRESSURE: 130 MMHG | DIASTOLIC BLOOD PRESSURE: 76 MMHG

## 2020-05-30 DIAGNOSIS — R51.9 SEVERE HEADACHE: ICD-10-CM

## 2020-05-30 DIAGNOSIS — R11.0 NAUSEA: ICD-10-CM

## 2020-05-30 DIAGNOSIS — J01.00 ACUTE NON-RECURRENT MAXILLARY SINUSITIS: ICD-10-CM

## 2020-05-30 PROCEDURE — 99214 OFFICE O/P EST MOD 30 MIN: CPT | Performed by: PHYSICIAN ASSISTANT

## 2020-05-30 RX ORDER — BUSPIRONE HYDROCHLORIDE 10 MG/1
TABLET ORAL
COMMUNITY
End: 2021-12-08

## 2020-05-30 RX ORDER — SULFAMETHOXAZOLE AND TRIMETHOPRIM 800; 160 MG/1; MG/1
TABLET ORAL
COMMUNITY
End: 2021-03-03

## 2020-05-30 RX ORDER — ONDANSETRON 4 MG/1
TABLET, ORALLY DISINTEGRATING ORAL
COMMUNITY
End: 2021-03-03

## 2020-05-30 RX ORDER — INDOMETHACIN 75 MG/1
CAPSULE, EXTENDED RELEASE ORAL
COMMUNITY
End: 2021-02-26

## 2020-05-30 RX ORDER — LISINOPRIL AND HYDROCHLOROTHIAZIDE 20; 12.5 MG/1; MG/1
TABLET ORAL
COMMUNITY
End: 2021-12-08

## 2020-05-30 RX ORDER — ONDANSETRON 4 MG/1
4 TABLET, FILM COATED ORAL EVERY 8 HOURS PRN
Qty: 20 TAB | Refills: 0 | Status: SHIPPED | OUTPATIENT
Start: 2020-05-30 | End: 2021-03-03

## 2020-05-30 RX ORDER — DOXYCYCLINE HYCLATE 100 MG
100 TABLET ORAL 2 TIMES DAILY
Qty: 20 TAB | Refills: 0 | Status: SHIPPED | OUTPATIENT
Start: 2020-05-30 | End: 2020-06-09

## 2020-05-30 RX ORDER — HYDROCODONE BITARTRATE AND ACETAMINOPHEN 5; 325 MG/1; MG/1
TABLET ORAL
COMMUNITY
End: 2021-02-26

## 2020-05-30 RX ORDER — KETOROLAC TROMETHAMINE 30 MG/ML
60 INJECTION, SOLUTION INTRAMUSCULAR; INTRAVENOUS ONCE
Status: COMPLETED | OUTPATIENT
Start: 2020-05-30 | End: 2020-05-30

## 2020-05-30 RX ORDER — CITALOPRAM 40 MG/1
TABLET ORAL
COMMUNITY
End: 2021-02-26

## 2020-05-30 RX ORDER — CHLORHEXIDINE GLUCONATE 4 G/100ML
SOLUTION TOPICAL
COMMUNITY
End: 2021-02-26

## 2020-05-30 RX ORDER — TRAMADOL HYDROCHLORIDE 50 MG/1
TABLET ORAL
COMMUNITY
End: 2021-03-03

## 2020-05-30 RX ADMIN — KETOROLAC TROMETHAMINE 60 MG: 30 INJECTION, SOLUTION INTRAMUSCULAR; INTRAVENOUS at 09:52

## 2020-05-30 ASSESSMENT — FIBROSIS 4 INDEX: FIB4 SCORE: 0.94

## 2020-05-30 NOTE — PROGRESS NOTES
Chief Complaint   Patient presents with   • Migraine     x3 days. Migraine, nausea, body aches, earache, sinus congestion.       HISTORY OF PRESENT ILLNESS: Patient is a 49 y.o. female who presents today because She has a one-week history of worsening headache, body aches, left earache, sinus pain, pressure, drainage and congestion and nausea.  She is out of her Zofran which usually helps her with her nausea.  She has taken some over-the-counter medications without any improvement.    Patient Active Problem List    Diagnosis Date Noted   • Fracture of left humerus 03/01/2019     Priority: High   • Tobacco use 03/01/2019     Priority: Medium   • Obesity 08/29/2012     Priority: Medium   • Alcoholism ([facility]) 03/01/2019     Priority: Low   • Hypertension 03/01/2019     Priority: Low   • Gastroesophageal reflux disease without esophagitis 03/01/2019     Priority: Low   • Depression 08/29/2012     Priority: Low   • Morbid obesity with BMI of 45.0-49.9, adult ([facility]) 12/20/2017   • Stenosis of esophagus 05/17/2017   • Morbid obesity ([facility]) 02/03/2016   • Tear of medial cartilage or meniscus of knee, current 12/10/2014   • Central perforation of tympanic membrane 11/06/2012   • Ruptured ear drum 08/29/2012   • Pedal edema 08/29/2012       Allergies:Aspirin; Other misc; and Nsaids    Current Outpatient Medications Ordered in Epic   Medication Sig Dispense Refill   • levonorgestrel (MIRENA, 52 MG,) 52 mg (20 mcg/24 hr) IUD Mirena 20 mcg/24 hours (5 yrs) 52 mg intrauterine device     • doxycycline (VIBRAMYCIN) 100 MG Tab Take 1 Tab by mouth 2 times a day for 10 days. 20 Tab 0   • ondansetron (ZOFRAN) 4 MG Tab tablet Take 1 Tab by mouth every 8 hours as needed for Nausea/Vomiting. 20 Tab 0   • TURMERIC PO Take 2 Caps by mouth every day.     • omeprazole (PRILOSEC) 20 MG delayed-release capsule Take 20 mg by mouth 2 times a day as needed.     • lisinopril-hydrochlorothiazide (PRINZIDE, ZESTORETIC) 20-12.5 MG per tablet Take 1 Tab by  mouth every morning.     • citalopram (CELEXA) 40 MG Tab Take 40 mg by mouth every day.     • busPIRone (BUSPAR) 10 MG Tab tablet buspirone 10 mg tablet   TK 1 T PO BID     • chlorhexidine (HIBICLENS) 4 % liquid Hibiclens 4 % topical liquid   APPLY EXTERNALLY UTD     • enoxaparin (LOVENOX) 40 MG/0.4ML Solution inj enoxaparin 40 mg/0.4 mL subcutaneous syringe     • HYDROcodone-acetaminophen (NORCO) 5-325 MG Tab per tablet hydrocodone 5 mg-acetaminophen 325 mg tablet   TK 1 T PO Q 8 TO 12 H PRN P     • indomethacin SR (INDOCIN SR) 75 MG Cap CR indomethacin ER 75 mg capsule,extended release   TK 1 C PO BID WF OR MILK     • ondansetron (ZOFRAN ODT) 4 MG TABLET DISPERSIBLE ondansetron 4 mg disintegrating tablet     • sulfamethoxazole-trimethoprim (BACTRIM DS) 800-160 MG tablet sulfamethoxazole 800 mg-trimethoprim 160 mg tablet   TK 1 T PO Q 12 H UTD     • tramadol (ULTRAM) 50 MG Tab tramadol 50 mg tablet   TK 1 T PO  Q 6 H PRN     • citalopram (CELEXA) 40 MG Tab citalopram 40 mg tablet   TK 1 T PO D     • lisinopril-hydrochlorothiazide (PRINZIDE) 20-12.5 MG per tablet lisinopril 20 mg-hydrochlorothiazide 12.5 mg tablet   TK 1 T PO QD     • amoxicillin-clavulanate (AUGMENTIN) 875-125 MG Tab Take 1 Tab by mouth 2 times a day. (Patient not taking: Reported on 5/30/2020) 14 Tab 0     Current Facility-Administered Medications Ordered in Epic   Medication Dose Route Frequency Provider Last Rate Last Dose   • ketorolac (TORADOL) injection 60 mg  60 mg Intramuscular Once Saurabh Wallis P.A.-CAmanda           Past Medical History:   Diagnosis Date   • Anemia     during pregnancy   • Anxiety    • Bowel habit changes     constipation   • Cancer (HCC) 2009    uterine   • Cold    • Dental disorder     lower partial   • Gynecological disorder     menorrhagia   • Heart burn    • Hiatus hernia syndrome    • Hypertension    • Pain 2014    left knee   • Psychiatric problem     anxiety/depression       Social History     Tobacco Use   •  "Smoking status: Current Every Day Smoker     Packs/day: 1.00     Years: 12.00     Pack years: 12.00     Types: Cigarettes     Last attempt to quit: 2019     Years since quittin.2   • Smokeless tobacco: Never Used   • Tobacco comment: 2 yrs   ppd   Substance Use Topics   • Alcohol use: Yes     Frequency: 4 or more times a week     Binge frequency: Daily or almost daily     Comment: 1 pint of whiskey nightly    • Drug use: No     Comment: meth  none since        Family Status   Relation Name Status   • Mo  Alive   • Fa  Alive     Family History   Problem Relation Age of Onset   • Breast Cancer Father        ROS:  Review of Systems   Constitutional: Negative for fever, chills, weight loss and Positive for myalgias andmalaise/fatigue.   HENT: Positive for left ear pain, 1 episode of resolved nosebleeds, Positive for nasal sinus pain, pressure, drainage and congestion,Without sore throat and neck pain.    Eyes: Negative for blurred vision.   Respiratory: Negative for cough, sputum production, shortness of breath and wheezing.    Cardiovascular: Negative for chest pain, palpitations, orthopnea and leg swelling.   Gastrointestinal: Negative for heartburn, Positive for diarrhea, nausea, vomiting and no abdominal pain.   Genitourinary: Negative for dysuria, urgency and frequency.     Exam:  /76   Pulse 75   Temp 36.6 °C (97.8 °F)   Resp 16   Ht 1.702 m (5' 7\")   Wt 117.9 kg (260 lb)   SpO2 97%   General:  Well nourished, well developed female in NAD  Head:Normocephalic, atraumatic  Eyes: PERRLA, EOM within normal limits, no conjunctival injection, no scleral icterus, visual fields and acuity grossly intact.  Ears: Normal shape and symmetry, no tenderness, no discharge. External canals are without any significant edema or erythema. Tympanic membranes are without any inflammation, no effusion. Left tympanic membrane has chronic perforation Gross auditory acuity is intact  Nose: Symmetrical without " tenderness, no discharge.Nasal mucosa on the left is erythematous and edematous with posterior nasal cavity exudate  Mouth: reasonable hygiene, no erythema exudates or tonsillar enlargement.  Neck: no masses, range of motion within normal limits, no tracheal deviation. No obvious thyroid enlargement.  Pulmonary: chest is symmetrical with respiration, no wheezes, crackles, or rhonchi.  Cardiovascular: regular rate and rhythm without murmurs, rubs, or gallops.  Extremities: no clubbing, cyanosis, or edema.    Please note that this dictation was created using voice recognition software. I have made every reasonable attempt to correct obvious errors, but I expect that there are errors of grammar and possibly content that I did not discover before finalizing the note.    Assessment/Plan:  1. Acute non-recurrent maxillary sinusitis  doxycycline (VIBRAMYCIN) 100 MG Tab   2. Severe headache  ketorolac (TORADOL) injection 60 mg   3. Nausea  ondansetron (ZOFRAN) 4 MG Tab tablet       Followup with primary care in the next 7-10 days if not significantly improving, return to the urgent care or go to the emergency room sooner for any worsening of symptoms.

## 2020-06-09 DIAGNOSIS — J01.00 ACUTE MAXILLARY SINUSITIS, RECURRENCE NOT SPECIFIED: ICD-10-CM

## 2020-07-11 ENCOUNTER — HOSPITAL ENCOUNTER (OUTPATIENT)
Dept: LAB | Facility: MEDICAL CENTER | Age: 50
End: 2020-07-11
Attending: PHYSICIAN ASSISTANT
Payer: MEDICAID

## 2020-07-11 LAB
25(OH)D3 SERPL-MCNC: 40 NG/ML (ref 30–100)
ALBUMIN SERPL BCP-MCNC: 3.5 G/DL (ref 3.2–4.9)
ALBUMIN/GLOB SERPL: 1.3 G/DL
ALP SERPL-CCNC: 101 U/L (ref 30–99)
ALT SERPL-CCNC: 14 U/L (ref 2–50)
ANION GAP SERPL CALC-SCNC: 10 MMOL/L (ref 7–16)
AST SERPL-CCNC: 12 U/L (ref 12–45)
BASOPHILS # BLD AUTO: 0.8 % (ref 0–1.8)
BASOPHILS # BLD: 0.05 K/UL (ref 0–0.12)
BILIRUB SERPL-MCNC: 0.3 MG/DL (ref 0.1–1.5)
BUN SERPL-MCNC: 5 MG/DL (ref 8–22)
CALCIUM SERPL-MCNC: 8.6 MG/DL (ref 8.5–10.5)
CHLORIDE SERPL-SCNC: 100 MMOL/L (ref 96–112)
CHOLEST SERPL-MCNC: 121 MG/DL (ref 100–199)
CO2 SERPL-SCNC: 27 MMOL/L (ref 20–33)
CREAT SERPL-MCNC: 0.59 MG/DL (ref 0.5–1.4)
EOSINOPHIL # BLD AUTO: 0.07 K/UL (ref 0–0.51)
EOSINOPHIL NFR BLD: 1.2 % (ref 0–6.9)
ERYTHROCYTE [DISTWIDTH] IN BLOOD BY AUTOMATED COUNT: 42.9 FL (ref 35.9–50)
FASTING STATUS PATIENT QL REPORTED: NORMAL
FOLATE SERPL-MCNC: 3.8 NG/ML
GLOBULIN SER CALC-MCNC: 2.8 G/DL (ref 1.9–3.5)
GLUCOSE SERPL-MCNC: 100 MG/DL (ref 65–99)
HCT VFR BLD AUTO: 40.4 % (ref 37–47)
HDLC SERPL-MCNC: 50 MG/DL
HGB BLD-MCNC: 13.3 G/DL (ref 12–16)
IMM GRANULOCYTES # BLD AUTO: 0.01 K/UL (ref 0–0.11)
IMM GRANULOCYTES NFR BLD AUTO: 0.2 % (ref 0–0.9)
IRON SATN MFR SERPL: 21 % (ref 15–55)
IRON SERPL-MCNC: 61 UG/DL (ref 40–170)
LDLC SERPL CALC-MCNC: 59 MG/DL
LYMPHOCYTES # BLD AUTO: 2.77 K/UL (ref 1–4.8)
LYMPHOCYTES NFR BLD: 46.5 % (ref 22–41)
MCH RBC QN AUTO: 30.8 PG (ref 27–33)
MCHC RBC AUTO-ENTMCNC: 32.9 G/DL (ref 33.6–35)
MCV RBC AUTO: 93.5 FL (ref 81.4–97.8)
MONOCYTES # BLD AUTO: 0.33 K/UL (ref 0–0.85)
MONOCYTES NFR BLD AUTO: 5.5 % (ref 0–13.4)
NEUTROPHILS # BLD AUTO: 2.73 K/UL (ref 2–7.15)
NEUTROPHILS NFR BLD: 45.8 % (ref 44–72)
NRBC # BLD AUTO: 0 K/UL
NRBC BLD-RTO: 0 /100 WBC
PLATELET # BLD AUTO: 238 K/UL (ref 164–446)
PMV BLD AUTO: 11.3 FL (ref 9–12.9)
POTASSIUM SERPL-SCNC: 3.9 MMOL/L (ref 3.6–5.5)
PREALB SERPL-MCNC: 14.9 MG/DL (ref 18–38)
PROT SERPL-MCNC: 6.3 G/DL (ref 6–8.2)
RBC # BLD AUTO: 4.32 M/UL (ref 4.2–5.4)
SODIUM SERPL-SCNC: 137 MMOL/L (ref 135–145)
TIBC SERPL-MCNC: 293 UG/DL (ref 250–450)
TRANSFERRIN SERPL-MCNC: 248 MG/DL (ref 200–370)
TRIGL SERPL-MCNC: 61 MG/DL (ref 0–149)
UIBC SERPL-MCNC: 232 UG/DL (ref 110–370)
VIT B12 SERPL-MCNC: 359 PG/ML (ref 211–911)
WBC # BLD AUTO: 6 K/UL (ref 4.8–10.8)

## 2020-07-11 PROCEDURE — 84134 ASSAY OF PREALBUMIN: CPT

## 2020-07-11 PROCEDURE — 84207 ASSAY OF VITAMIN B-6: CPT

## 2020-07-11 PROCEDURE — 82306 VITAMIN D 25 HYDROXY: CPT

## 2020-07-11 PROCEDURE — 83550 IRON BINDING TEST: CPT

## 2020-07-11 PROCEDURE — 84252 ASSAY OF VITAMIN B-2: CPT

## 2020-07-11 PROCEDURE — 85025 COMPLETE CBC W/AUTO DIFF WBC: CPT

## 2020-07-11 PROCEDURE — 80053 COMPREHEN METABOLIC PANEL: CPT

## 2020-07-11 PROCEDURE — 80061 LIPID PANEL: CPT

## 2020-07-11 PROCEDURE — 83540 ASSAY OF IRON: CPT

## 2020-07-11 PROCEDURE — 84466 ASSAY OF TRANSFERRIN: CPT

## 2020-07-11 PROCEDURE — 82607 VITAMIN B-12: CPT

## 2020-07-11 PROCEDURE — 82746 ASSAY OF FOLIC ACID SERUM: CPT

## 2020-07-11 PROCEDURE — 84425 ASSAY OF VITAMIN B-1: CPT

## 2020-07-11 PROCEDURE — 36415 COLL VENOUS BLD VENIPUNCTURE: CPT

## 2020-07-15 LAB — VIT B6 SERPL-MCNC: 19.8 NMOL/L (ref 20–125)

## 2020-07-16 LAB
VIT B1 BLD-MCNC: 113 NMOL/L (ref 70–180)
VIT B2 SERPL-SCNC: 7 NMOL/L (ref 5–50)

## 2020-08-17 ENCOUNTER — OFFICE VISIT (OUTPATIENT)
Dept: URGENT CARE | Facility: PHYSICIAN GROUP | Age: 50
End: 2020-08-17
Payer: MEDICAID

## 2020-08-17 VITALS
TEMPERATURE: 98.1 F | WEIGHT: 262 LBS | BODY MASS INDEX: 41.04 KG/M2 | DIASTOLIC BLOOD PRESSURE: 78 MMHG | HEART RATE: 76 BPM | RESPIRATION RATE: 16 BRPM | OXYGEN SATURATION: 99 % | SYSTOLIC BLOOD PRESSURE: 118 MMHG

## 2020-08-17 DIAGNOSIS — J01.00 ACUTE MAXILLARY SINUSITIS, RECURRENCE NOT SPECIFIED: ICD-10-CM

## 2020-08-17 DIAGNOSIS — H60.312 ACUTE DIFFUSE OTITIS EXTERNA OF LEFT EAR: ICD-10-CM

## 2020-08-17 PROCEDURE — 99214 OFFICE O/P EST MOD 30 MIN: CPT | Performed by: NURSE PRACTITIONER

## 2020-08-17 RX ORDER — CIPROFLOXACIN AND DEXAMETHASONE 3; 1 MG/ML; MG/ML
4 SUSPENSION/ DROPS AURICULAR (OTIC) DAILY
Qty: 10 ML | Refills: 0 | Status: SHIPPED | OUTPATIENT
Start: 2020-08-17 | End: 2020-08-24

## 2020-08-17 RX ORDER — AMOXICILLIN AND CLAVULANATE POTASSIUM 875; 125 MG/1; MG/1
1 TABLET, FILM COATED ORAL 2 TIMES DAILY
Qty: 14 TAB | Refills: 0 | Status: SHIPPED | OUTPATIENT
Start: 2020-08-17 | End: 2020-08-24

## 2020-08-17 RX ORDER — LAMOTRIGINE 25 MG/1
25 TABLET ORAL DAILY
COMMUNITY
End: 2021-02-26

## 2020-08-17 ASSESSMENT — ENCOUNTER SYMPTOMS
SINUS PAIN: 1
FEVER: 0
CHILLS: 0

## 2020-08-17 ASSESSMENT — FIBROSIS 4 INDEX: FIB4 SCORE: 0.65

## 2020-08-17 NOTE — PROGRESS NOTES
Subjective:      Nneka Ruiz is a 50 y.o. female who presents with Ear Drainage (L ear) and Sinusitis    Past Medical History:   Diagnosis Date   • Anemia     during pregnancy   • Anxiety    • Bowel habit changes     constipation   • Cancer (HCC) 2009    uterine   • Cold    • Dental disorder     lower partial   • Gynecological disorder     menorrhagia   • Heart burn    • Hiatus hernia syndrome    • Hypertension    • Pain 2014    left knee   • Psychiatric problem     anxiety/depression     Social History     Socioeconomic History   • Marital status:      Spouse name: Not on file   • Number of children: Not on file   • Years of education: Not on file   • Highest education level: Not on file   Occupational History   • Not on file   Social Needs   • Financial resource strain: Not on file   • Food insecurity     Worry: Not on file     Inability: Not on file   • Transportation needs     Medical: Not on file     Non-medical: Not on file   Tobacco Use   • Smoking status: Current Every Day Smoker     Packs/day: 1.00     Years: 12.00     Pack years: 12.00     Types: Cigarettes     Last attempt to quit: 2019     Years since quittin.5   • Smokeless tobacco: Never Used   • Tobacco comment: 2 yrs  1/2 ppd   Substance and Sexual Activity   • Alcohol use: Yes     Frequency: 4 or more times a week     Binge frequency: Daily or almost daily     Comment: 1 pint of whiskey nightly    • Drug use: No     Comment: meth  none since    • Sexual activity: Yes     Partners: Male   Lifestyle   • Physical activity     Days per week: Not on file     Minutes per session: Not on file   • Stress: Not on file   Relationships   • Social connections     Talks on phone: Not on file     Gets together: Not on file     Attends Yazdanism service: Not on file     Active member of club or organization: Not on file     Attends meetings of clubs or organizations: Not on file     Relationship status: Not on file   • Intimate  partner violence     Fear of current or ex partner: Not on file     Emotionally abused: Not on file     Physically abused: Not on file     Forced sexual activity: Not on file   Other Topics Concern   • Not on file   Social History Narrative   • Not on file     Family History   Problem Relation Age of Onset   • Breast Cancer Father        Allergies: Aspirin, Other misc, and Nsaids    Patient is a 50-year-old female who presents today with complaint of left ear pain and drainage.  She is concerned that she has a ruptured tympanic membrane.  Positive prior history of this before.  She does have an appointment to follow-up next week with her ENT.  Secondly, patient complains of sinus pain and pressure also on the left side with intermittent drainage.  No fever, aches, or chills.  No shortness of breath or chest congestion.            Ear Drainage  This is a new problem. The current episode started in the past 7 days. The problem occurs constantly. The problem has been unchanged. Associated symptoms include congestion. Pertinent negatives include no chills or fever. Associated symptoms comments: Sinus pain, pressure and drainage. Nothing aggravates the symptoms. She has tried nothing for the symptoms. The treatment provided no relief.       Review of Systems   Constitutional: Negative for chills and fever.   HENT: Positive for congestion, ear discharge, ear pain and sinus pain.    All other systems reviewed and are negative.         Objective:     /78   Pulse 76   Temp 36.7 °C (98.1 °F) (Temporal)   Resp 16   Wt 118.8 kg (262 lb)   SpO2 99%   BMI 41.04 kg/m²      Physical Exam  Vitals signs reviewed.   Constitutional:       Appearance: Normal appearance.   HENT:      Head: Normocephalic and atraumatic.      Right Ear: Tympanic membrane, ear canal and external ear normal.      Ears:      Comments: Scant amount of purulent drainage in the left EAC.  There does appear to be a perforation of the tympanic  membrane.  Positive preauricular tenderness.     Nose: Nose normal.      Comments: Tender over the left maxillary sinus, scant amount of yellow postnasal drainage     Mouth/Throat:      Mouth: Mucous membranes are moist.   Eyes:      Extraocular Movements: Extraocular movements intact.      Conjunctiva/sclera: Conjunctivae normal.      Pupils: Pupils are equal, round, and reactive to light.   Neck:      Musculoskeletal: Normal range of motion and neck supple.   Cardiovascular:      Rate and Rhythm: Normal rate and regular rhythm.      Heart sounds: Normal heart sounds.   Pulmonary:      Effort: Pulmonary effort is normal.      Breath sounds: Normal breath sounds.   Musculoskeletal: Normal range of motion.   Skin:     General: Skin is warm.   Neurological:      Mental Status: She is alert and oriented to person, place, and time.   Psychiatric:         Mood and Affect: Mood normal.         Behavior: Behavior normal.         Thought Content: Thought content normal.         Judgment: Judgment normal.                 Assessment/Plan:   Otalgia  Sinusitis  oe    ciprodex   augmentin  flonase  Humidifier  Follow up for persistent or worsening of sypmtoms.  Keep follow-up appointment next week with ENT     There are no diagnoses linked to this encounter.

## 2020-10-09 ENCOUNTER — OFFICE VISIT (OUTPATIENT)
Dept: URGENT CARE | Facility: PHYSICIAN GROUP | Age: 50
End: 2020-10-09
Payer: MEDICAID

## 2020-10-09 VITALS
DIASTOLIC BLOOD PRESSURE: 68 MMHG | BODY MASS INDEX: 40.81 KG/M2 | WEIGHT: 260 LBS | TEMPERATURE: 98.4 F | RESPIRATION RATE: 16 BRPM | HEIGHT: 67 IN | SYSTOLIC BLOOD PRESSURE: 118 MMHG | HEART RATE: 71 BPM | OXYGEN SATURATION: 97 %

## 2020-10-09 DIAGNOSIS — H65.493 OTHER CHRONIC NONSUPPURATIVE OTITIS MEDIA OF BOTH EARS: ICD-10-CM

## 2020-10-09 DIAGNOSIS — H61.892 IRRITATION OF LEFT EXTERNAL AUDITORY CANAL: ICD-10-CM

## 2020-10-09 PROCEDURE — 99214 OFFICE O/P EST MOD 30 MIN: CPT | Performed by: STUDENT IN AN ORGANIZED HEALTH CARE EDUCATION/TRAINING PROGRAM

## 2020-10-09 ASSESSMENT — FIBROSIS 4 INDEX: FIB4 SCORE: 0.65

## 2020-10-09 ASSESSMENT — PAIN SCALES - GENERAL: PAINLEVEL: 4=SLIGHT-MODERATE PAIN

## 2020-10-10 NOTE — PROGRESS NOTES
"Subjective:   CHIEF COMPLAINT  Chief Complaint   Patient presents with   • Ear Drainage     (L) ear is bleeding since pt has woken up, pt states she does have a ruptured ear drum.        HPI  Nneka Ruiz is a 50 y.o. female a past medical history significant for recurrent bilateral otitis media, status post tympanostomy right ear and bilateral hearing deficits presents with a chief complaint of bleeding from her left ear. The patient reports she noticed the bleeding just prior to arrival after waking from a nap. She is uncertain what happened. No known trauma.  The patient says she saw her ENT 2 days ago and was fitted for hearing aids at that time. She was not wearing the hearing aids while sleeping.  She was not having any discharge from her ear prior to today.  She has not had any issues since placement of the hearing aids. She is not experiencing any pain.     REVIEW OF SYSTEMS  General: no fever or chills  GI: no nausea or vomiting  See HPI for further details. All other systems are negative.     PAST MEDICAL HISTORY   has a past medical history of Anemia, Anxiety, Bowel habit changes, Cancer ([facility]) (2009), Cold, Dental disorder, Gynecological disorder, Heart burn, Hiatus hernia syndrome, Hypertension, Pain (2014), and Psychiatric problem.    SURGICAL HISTORY   has a past surgical history that includes tubal ligation; tonsillectomy; tympanoplasty (11/6/2012); knee arthroscopy (12/10/2014); medial meniscectomy (12/10/2014); meniscectomy (12/10/2014); lateral release (12/10/2014); gastric sleeve laparoscopy (N/A, 2/3/2016); gastroscopy (5/17/2017); humerus orif (Left, 3/1/2019); gyn surgery; gastric bypass laparoscopic (3/20/2019); and hiatal hernia repair (3/20/2019).    ALLERGIES  Allergies   Allergen Reactions   • Aspirin Vomiting   • Other Misc      Waterproof band aids took off skin and caused infection    • Nsaids Unspecified     Pt states not allergic to ALL nsaids, just \"not supposed to take them " "after gastric sleeve surgery\"       CURRENT MEDICATIONS  Home Medications     Reviewed by Vladimir Cisneros Ass't (Medical Assistant) on 10/09/20 at 1801  Med List Status: <None>   Medication Last Dose Status   amoxicillin-clavulanate (AUGMENTIN) 875-125 MG Tab Not Taking Active   busPIRone (BUSPAR) 10 MG Tab tablet Taking Active   chlorhexidine (HIBICLENS) 4 % liquid Not Taking Active   citalopram (CELEXA) 40 MG Tab Taking Active   citalopram (CELEXA) 40 MG Tab Taking Active   enoxaparin (LOVENOX) 40 MG/0.4ML Solution inj Not Taking Active   HYDROcodone-acetaminophen (NORCO) 5-325 MG Tab per tablet Not Taking Active   indomethacin SR (INDOCIN SR) 75 MG Cap CR Not Taking Active   lamoTRIgine (LAMICTAL) 25 MG Tab Taking Active   levonorgestrel (MIRENA, 52 MG,) 52 mg (20 mcg/24 hr) IUD Taking Active   lisinopril-hydrochlorothiazide (PRINZIDE) 20-12.5 MG per tablet Taking Active   lisinopril-hydrochlorothiazide (PRINZIDE, ZESTORETIC) 20-12.5 MG per tablet Taking Active   omeprazole (PRILOSEC) 20 MG delayed-release capsule Taking Active   ondansetron (ZOFRAN ODT) 4 MG TABLET DISPERSIBLE PRN Active   ondansetron (ZOFRAN) 4 MG Tab tablet PRN Active   QUEtiapine Fumarate (SEROQUEL PO) Taking Active   sulfamethoxazole-trimethoprim (BACTRIM DS) 800-160 MG tablet Not Taking Active   tramadol (ULTRAM) 50 MG Tab Not Taking Active   TURMERIC PO Taking Active                SOCIAL HISTORY  Social History     Tobacco Use   • Smoking status: Current Every Day Smoker     Packs/day: 1.00     Years: 12.00     Pack years: 12.00     Types: Cigarettes     Last attempt to quit: 2019     Years since quittin.6   • Smokeless tobacco: Never Used   • Tobacco comment: 2 yrs   ppd   Substance and Sexual Activity   • Alcohol use: Yes     Frequency: 4 or more times a week     Binge frequency: Daily or almost daily     Comment: 1 pint of whiskey nightly    • Drug use: No     Comment: meth  none since    • Sexual activity: Yes " "    Partners: Male       FAMILY HISTORY  Family History   Problem Relation Age of Onset   • Breast Cancer Father           Objective:   PHYSICAL EXAM  VITAL SIGNS: /68   Pulse 71   Temp 36.9 °C (98.4 °F) (Temporal)   Resp 16   Ht 1.702 m (5' 7\")   Wt 117.9 kg (260 lb)   SpO2 97%   BMI 40.72 kg/m²     Gen: no acute distress, normal voice  Skin: dry, intact, moist mucosal membranes  ENT: Pooled blood along the left external with a small abrasion within the middle third of the canal at the 7:00 location.  Evidence of tympanic membrane rupture.  Tympanostomy tube noted in the right ear   Lungs: CTAB w/ symmetric expansion  CV: RRR w/o murmurs or clicks  Psych: normal affect, normal judgement, alert, awake    Assessment/Plan:     1. Irritation of left external auditory canal     2. Other chronic nonsuppurative otitis media of both ears     Suspect patient sustained a small abrasion in the left external ear canal 2/2 her hearing aid.  Instructed the patient to discontinue the hearing aid in the left ear and to contact her ENT's office updating them with today's events to get further guidance about when to resume use of hearing aid.  I also instructed the patient to remove the rook earring as this could potentially predispose the site to infection. Pt was in full understanding and agreement with the plan.    Differential diagnosis, natural history, supportive care, and indications for immediate follow-up discussed. All questions answered. Patient agrees with the plan of care.  Follow-up as needed if symptoms worsen or fail to improve to PCP, Urgent care or Emergency Room.           "

## 2020-10-29 ENCOUNTER — HOSPITAL ENCOUNTER (OUTPATIENT)
Dept: LAB | Facility: MEDICAL CENTER | Age: 50
End: 2020-10-29
Attending: NURSE PRACTITIONER
Payer: MEDICAID

## 2020-10-29 ENCOUNTER — HOSPITAL ENCOUNTER (OUTPATIENT)
Dept: LAB | Facility: MEDICAL CENTER | Age: 50
End: 2020-10-29
Attending: PHYSICIAN ASSISTANT
Payer: MEDICAID

## 2020-10-29 LAB
BASOPHILS # BLD AUTO: 0.6 % (ref 0–1.8)
BASOPHILS # BLD AUTO: 0.8 % (ref 0–1.8)
BASOPHILS # BLD: 0.03 K/UL (ref 0–0.12)
BASOPHILS # BLD: 0.04 K/UL (ref 0–0.12)
EOSINOPHIL # BLD AUTO: 0.03 K/UL (ref 0–0.51)
EOSINOPHIL # BLD AUTO: 0.04 K/UL (ref 0–0.51)
EOSINOPHIL NFR BLD: 0.6 % (ref 0–6.9)
EOSINOPHIL NFR BLD: 0.8 % (ref 0–6.9)
ERYTHROCYTE [DISTWIDTH] IN BLOOD BY AUTOMATED COUNT: 41.1 FL (ref 35.9–50)
ERYTHROCYTE [DISTWIDTH] IN BLOOD BY AUTOMATED COUNT: 41.3 FL (ref 35.9–50)
HCT VFR BLD AUTO: 41.1 % (ref 37–47)
HCT VFR BLD AUTO: 41.2 % (ref 37–47)
HGB BLD-MCNC: 13.6 G/DL (ref 12–16)
HGB BLD-MCNC: 13.6 G/DL (ref 12–16)
IMM GRANULOCYTES # BLD AUTO: 0.01 K/UL (ref 0–0.11)
IMM GRANULOCYTES # BLD AUTO: 0.01 K/UL (ref 0–0.11)
IMM GRANULOCYTES NFR BLD AUTO: 0.2 % (ref 0–0.9)
IMM GRANULOCYTES NFR BLD AUTO: 0.2 % (ref 0–0.9)
LYMPHOCYTES # BLD AUTO: 2.33 K/UL (ref 1–4.8)
LYMPHOCYTES # BLD AUTO: 2.41 K/UL (ref 1–4.8)
LYMPHOCYTES NFR BLD: 45.5 % (ref 22–41)
LYMPHOCYTES NFR BLD: 46.4 % (ref 22–41)
MCH RBC QN AUTO: 30 PG (ref 27–33)
MCH RBC QN AUTO: 30.1 PG (ref 27–33)
MCHC RBC AUTO-ENTMCNC: 33 G/DL (ref 33.6–35)
MCHC RBC AUTO-ENTMCNC: 33.1 G/DL (ref 33.6–35)
MCV RBC AUTO: 90.7 FL (ref 81.4–97.8)
MCV RBC AUTO: 90.9 FL (ref 81.4–97.8)
MONOCYTES # BLD AUTO: 0.26 K/UL (ref 0–0.85)
MONOCYTES # BLD AUTO: 0.28 K/UL (ref 0–0.85)
MONOCYTES NFR BLD AUTO: 5.1 % (ref 0–13.4)
MONOCYTES NFR BLD AUTO: 5.4 % (ref 0–13.4)
NEUTROPHILS # BLD AUTO: 2.42 K/UL (ref 2–7.15)
NEUTROPHILS # BLD AUTO: 2.45 K/UL (ref 2–7.15)
NEUTROPHILS NFR BLD: 46.6 % (ref 44–72)
NEUTROPHILS NFR BLD: 47.8 % (ref 44–72)
NRBC # BLD AUTO: 0 K/UL
NRBC # BLD AUTO: 0 K/UL
NRBC BLD-RTO: 0 /100 WBC
NRBC BLD-RTO: 0 /100 WBC
PLATELET # BLD AUTO: 253 K/UL (ref 164–446)
PLATELET # BLD AUTO: 266 K/UL (ref 164–446)
PMV BLD AUTO: 11.4 FL (ref 9–12.9)
PMV BLD AUTO: 11.6 FL (ref 9–12.9)
RBC # BLD AUTO: 4.52 M/UL (ref 4.2–5.4)
RBC # BLD AUTO: 4.54 M/UL (ref 4.2–5.4)
TRANSFERRIN SERPL-MCNC: 317 MG/DL (ref 200–370)
WBC # BLD AUTO: 5.1 K/UL (ref 4.8–10.8)
WBC # BLD AUTO: 5.2 K/UL (ref 4.8–10.8)

## 2020-10-29 PROCEDURE — 80053 COMPREHEN METABOLIC PANEL: CPT | Mod: 91

## 2020-10-29 PROCEDURE — 80053 COMPREHEN METABOLIC PANEL: CPT

## 2020-10-29 PROCEDURE — 84252 ASSAY OF VITAMIN B-2: CPT

## 2020-10-29 PROCEDURE — 84134 ASSAY OF PREALBUMIN: CPT

## 2020-10-29 PROCEDURE — 36415 COLL VENOUS BLD VENIPUNCTURE: CPT

## 2020-10-29 PROCEDURE — 84443 ASSAY THYROID STIM HORMONE: CPT

## 2020-10-29 PROCEDURE — 83615 LACTATE (LD) (LDH) ENZYME: CPT

## 2020-10-29 PROCEDURE — 82977 ASSAY OF GGT: CPT

## 2020-10-29 PROCEDURE — 83540 ASSAY OF IRON: CPT | Mod: 91

## 2020-10-29 PROCEDURE — 80061 LIPID PANEL: CPT | Mod: 91

## 2020-10-29 PROCEDURE — 84481 FREE ASSAY (FT-3): CPT

## 2020-10-29 PROCEDURE — 85025 COMPLETE CBC W/AUTO DIFF WBC: CPT | Mod: 91

## 2020-10-29 PROCEDURE — 84466 ASSAY OF TRANSFERRIN: CPT

## 2020-10-29 PROCEDURE — 82746 ASSAY OF FOLIC ACID SERUM: CPT

## 2020-10-29 PROCEDURE — 84100 ASSAY OF PHOSPHORUS: CPT

## 2020-10-29 PROCEDURE — 85025 COMPLETE CBC W/AUTO DIFF WBC: CPT

## 2020-10-29 PROCEDURE — 84207 ASSAY OF VITAMIN B-6: CPT

## 2020-10-29 PROCEDURE — 83540 ASSAY OF IRON: CPT

## 2020-10-29 PROCEDURE — 83550 IRON BINDING TEST: CPT

## 2020-10-29 PROCEDURE — 80061 LIPID PANEL: CPT

## 2020-10-29 PROCEDURE — 82306 VITAMIN D 25 HYDROXY: CPT

## 2020-10-29 PROCEDURE — 84550 ASSAY OF BLOOD/URIC ACID: CPT

## 2020-10-29 PROCEDURE — 82607 VITAMIN B-12: CPT

## 2020-10-29 PROCEDURE — 84425 ASSAY OF VITAMIN B-1: CPT

## 2020-10-30 LAB
25(OH)D3 SERPL-MCNC: 37 NG/ML (ref 30–100)
ALBUMIN SERPL BCP-MCNC: 4.1 G/DL (ref 3.2–4.9)
ALBUMIN SERPL BCP-MCNC: 4.2 G/DL (ref 3.2–4.9)
ALBUMIN/GLOB SERPL: 1.3 G/DL
ALBUMIN/GLOB SERPL: 1.3 G/DL
ALP SERPL-CCNC: 114 U/L (ref 30–99)
ALP SERPL-CCNC: 115 U/L (ref 30–99)
ALT SERPL-CCNC: 17 U/L (ref 2–50)
ALT SERPL-CCNC: 18 U/L (ref 2–50)
ANION GAP SERPL CALC-SCNC: 9 MMOL/L (ref 7–16)
ANION GAP SERPL CALC-SCNC: 9 MMOL/L (ref 7–16)
AST SERPL-CCNC: 14 U/L (ref 12–45)
AST SERPL-CCNC: 16 U/L (ref 12–45)
BILIRUB SERPL-MCNC: 0.3 MG/DL (ref 0.1–1.5)
BILIRUB SERPL-MCNC: 0.3 MG/DL (ref 0.1–1.5)
BUN SERPL-MCNC: 7 MG/DL (ref 8–22)
BUN SERPL-MCNC: 7 MG/DL (ref 8–22)
CALCIUM SERPL-MCNC: 8.9 MG/DL (ref 8.5–10.5)
CALCIUM SERPL-MCNC: 9.2 MG/DL (ref 8.5–10.5)
CHLORIDE SERPL-SCNC: 101 MMOL/L (ref 96–112)
CHLORIDE SERPL-SCNC: 103 MMOL/L (ref 96–112)
CHOLEST SERPL-MCNC: 138 MG/DL (ref 100–199)
CHOLEST SERPL-MCNC: 143 MG/DL (ref 100–199)
CO2 SERPL-SCNC: 25 MMOL/L (ref 20–33)
CO2 SERPL-SCNC: 25 MMOL/L (ref 20–33)
CREAT SERPL-MCNC: 0.53 MG/DL (ref 0.5–1.4)
CREAT SERPL-MCNC: 0.53 MG/DL (ref 0.5–1.4)
FASTING STATUS PATIENT QL REPORTED: NORMAL
FASTING STATUS PATIENT QL REPORTED: NORMAL
FOLATE SERPL-MCNC: 3 NG/ML
GGT SERPL-CCNC: 8 U/L (ref 7–34)
GLOBULIN SER CALC-MCNC: 3.1 G/DL (ref 1.9–3.5)
GLOBULIN SER CALC-MCNC: 3.3 G/DL (ref 1.9–3.5)
GLUCOSE SERPL-MCNC: 76 MG/DL (ref 65–99)
GLUCOSE SERPL-MCNC: 77 MG/DL (ref 65–99)
HDLC SERPL-MCNC: 49 MG/DL
HDLC SERPL-MCNC: 52 MG/DL
IRON SATN MFR SERPL: 25 % (ref 15–55)
IRON SERPL-MCNC: 92 UG/DL (ref 40–170)
IRON SERPL-MCNC: 95 UG/DL (ref 40–170)
LDH SERPL L TO P-CCNC: 157 U/L (ref 107–266)
LDLC SERPL CALC-MCNC: 74 MG/DL
LDLC SERPL CALC-MCNC: 75 MG/DL
PHOSPHATE SERPL-MCNC: 4.3 MG/DL (ref 2.5–4.5)
POTASSIUM SERPL-SCNC: 4 MMOL/L (ref 3.6–5.5)
POTASSIUM SERPL-SCNC: 4 MMOL/L (ref 3.6–5.5)
PREALB SERPL-MCNC: 19.4 MG/DL (ref 18–38)
PROT SERPL-MCNC: 7.2 G/DL (ref 6–8.2)
PROT SERPL-MCNC: 7.5 G/DL (ref 6–8.2)
SODIUM SERPL-SCNC: 135 MMOL/L (ref 135–145)
SODIUM SERPL-SCNC: 137 MMOL/L (ref 135–145)
T3FREE SERPL-MCNC: 2.59 PG/ML (ref 2–4.4)
TIBC SERPL-MCNC: 378 UG/DL (ref 250–450)
TRIGL SERPL-MCNC: 75 MG/DL (ref 0–149)
TRIGL SERPL-MCNC: 80 MG/DL (ref 0–149)
TSH SERPL DL<=0.005 MIU/L-ACNC: 1.08 UIU/ML (ref 0.38–5.33)
UIBC SERPL-MCNC: 283 UG/DL (ref 110–370)
URATE SERPL-MCNC: 3.4 MG/DL (ref 1.9–8.2)
VIT B12 SERPL-MCNC: 487 PG/ML (ref 211–911)

## 2020-11-02 LAB — VIT B6 SERPL-MCNC: 30.2 NMOL/L (ref 20–125)

## 2020-11-03 LAB
VIT B1 BLD-MCNC: 117 NMOL/L (ref 70–180)
VIT B2 SERPL-SCNC: 11 NMOL/L (ref 5–50)

## 2020-12-01 ENCOUNTER — OFFICE VISIT (OUTPATIENT)
Dept: URGENT CARE | Facility: PHYSICIAN GROUP | Age: 50
End: 2020-12-01
Payer: MEDICAID

## 2020-12-01 ENCOUNTER — HOSPITAL ENCOUNTER (OUTPATIENT)
Facility: MEDICAL CENTER | Age: 50
End: 2020-12-01
Attending: PHYSICIAN ASSISTANT
Payer: MEDICAID

## 2020-12-01 VITALS
TEMPERATURE: 97.5 F | BODY MASS INDEX: 40.81 KG/M2 | HEIGHT: 67 IN | RESPIRATION RATE: 16 BRPM | DIASTOLIC BLOOD PRESSURE: 82 MMHG | OXYGEN SATURATION: 96 % | HEART RATE: 80 BPM | SYSTOLIC BLOOD PRESSURE: 118 MMHG | WEIGHT: 260 LBS

## 2020-12-01 DIAGNOSIS — Z20.822 EXPOSURE TO COVID-19 VIRUS: ICD-10-CM

## 2020-12-01 DIAGNOSIS — J06.9 UPPER RESPIRATORY TRACT INFECTION, UNSPECIFIED TYPE: ICD-10-CM

## 2020-12-01 LAB — COVID ORDER STATUS COVID19: NORMAL

## 2020-12-01 PROCEDURE — 99214 OFFICE O/P EST MOD 30 MIN: CPT | Mod: CS | Performed by: PHYSICIAN ASSISTANT

## 2020-12-01 PROCEDURE — U0003 INFECTIOUS AGENT DETECTION BY NUCLEIC ACID (DNA OR RNA); SEVERE ACUTE RESPIRATORY SYNDROME CORONAVIRUS 2 (SARS-COV-2) (CORONAVIRUS DISEASE [COVID-19]), AMPLIFIED PROBE TECHNIQUE, MAKING USE OF HIGH THROUGHPUT TECHNOLOGIES AS DESCRIBED BY CMS-2020-01-R: HCPCS

## 2020-12-01 ASSESSMENT — FIBROSIS 4 INDEX: FIB4 SCORE: 0.64

## 2020-12-01 NOTE — PROGRESS NOTES
Chief Complaint   Patient presents with   • Headache     body aches, no taste       HISTORY OF PRESENT ILLNESS: Patient is a 50 y.o. female who presents today because a week history of headache, and a several day history of body aches, lack of taste.  She has been taking some over-the-counter medications and quarantining herself.  She does have exposure to Covid positive patients    Patient Active Problem List    Diagnosis Date Noted   • Fracture of left humerus 03/01/2019     Priority: High   • Tobacco use 03/01/2019     Priority: Medium   • Obesity 08/29/2012     Priority: Medium   • Alcoholism ([facility]) 03/01/2019     Priority: Low   • Hypertension 03/01/2019     Priority: Low   • Gastroesophageal reflux disease without esophagitis 03/01/2019     Priority: Low   • Depression 08/29/2012     Priority: Low   • Morbid obesity with BMI of 45.0-49.9, adult ([facility]) 12/20/2017   • Stenosis of esophagus 05/17/2017   • Morbid obesity ([facility]) 02/03/2016   • Tear of medial cartilage or meniscus of knee, current 12/10/2014   • Central perforation of tympanic membrane 11/06/2012   • Ruptured ear drum 08/29/2012   • Pedal edema 08/29/2012       Allergies:Aspirin, Other misc, and Nsaids    Current Outpatient Medications Ordered in Epic   Medication Sig Dispense Refill   • Cariprazine HCl (VRAYLAR PO) Take  by mouth.     • Topiramate (TOPAMAX PO) Take  by mouth.     • QUEtiapine Fumarate (SEROQUEL PO) Take  by mouth.     • busPIRone (BUSPAR) 10 MG Tab tablet buspirone 10 mg tablet   TK 1 T PO BID     • levonorgestrel (MIRENA, 52 MG,) 52 mg (20 mcg/24 hr) IUD Mirena 20 mcg/24 hours (5 yrs) 52 mg intrauterine device     • ondansetron (ZOFRAN ODT) 4 MG TABLET DISPERSIBLE ondansetron 4 mg disintegrating tablet     • lisinopril-hydrochlorothiazide (PRINZIDE) 20-12.5 MG per tablet lisinopril 20 mg-hydrochlorothiazide 12.5 mg tablet   TK 1 T PO QD     • TURMERIC PO Take 2 Caps by mouth every day.     • omeprazole (PRILOSEC) 20 MG  delayed-release capsule Take 20 mg by mouth 2 times a day as needed.     • citalopram (CELEXA) 40 MG Tab Take 40 mg by mouth every day.     • lamoTRIgine (LAMICTAL) 25 MG Tab Take 25 mg by mouth every day.     • chlorhexidine (HIBICLENS) 4 % liquid Hibiclens 4 % topical liquid   APPLY EXTERNALLY UTD     • enoxaparin (LOVENOX) 40 MG/0.4ML Solution inj enoxaparin 40 mg/0.4 mL subcutaneous syringe     • HYDROcodone-acetaminophen (NORCO) 5-325 MG Tab per tablet hydrocodone 5 mg-acetaminophen 325 mg tablet   TK 1 T PO Q 8 TO 12 H PRN P     • indomethacin SR (INDOCIN SR) 75 MG Cap CR indomethacin ER 75 mg capsule,extended release   TK 1 C PO BID WF OR MILK     • sulfamethoxazole-trimethoprim (BACTRIM DS) 800-160 MG tablet sulfamethoxazole 800 mg-trimethoprim 160 mg tablet   TK 1 T PO Q 12 H UTD     • tramadol (ULTRAM) 50 MG Tab tramadol 50 mg tablet   TK 1 T PO  Q 6 H PRN     • citalopram (CELEXA) 40 MG Tab citalopram 40 mg tablet   TK 1 T PO D     • ondansetron (ZOFRAN) 4 MG Tab tablet Take 1 Tab by mouth every 8 hours as needed for Nausea/Vomiting. 20 Tab 0   • amoxicillin-clavulanate (AUGMENTIN) 875-125 MG Tab Take 1 Tab by mouth 2 times a day. (Patient not taking: Reported on 5/30/2020) 14 Tab 0   • lisinopril-hydrochlorothiazide (PRINZIDE, ZESTORETIC) 20-12.5 MG per tablet Take 1 Tab by mouth every morning.       No current [facility]-ordered facility-administered medications on file.        Past Medical History:   Diagnosis Date   • Anemia     during pregnancy   • Anxiety    • Bowel habit changes     constipation   • Cancer (HCC) 2009    uterine   • Cold    • Dental disorder     lower partial   • Gynecological disorder     menorrhagia   • Heart burn    • Hiatus hernia syndrome    • Hypertension    • Pain 2014    left knee   • Psychiatric problem     anxiety/depression       Social History     Tobacco Use   • Smoking status: Current Every Day Smoker     Packs/day: 1.00     Years: 12.00     Pack years: 12.00     Types:  "Cigarettes     Last attempt to quit: 2019     Years since quittin.8   • Smokeless tobacco: Never Used   • Tobacco comment: 2 yrs   ppd   Substance Use Topics   • Alcohol use: Yes     Frequency: 4 or more times a week     Binge frequency: Daily or almost daily     Comment: 1 pint of whiskey nightly    • Drug use: No     Comment: meth  none since        Family Status   Relation Name Status   • Mo  Alive   • Fa  Alive     Family History   Problem Relation Age of Onset   • Breast Cancer Father        ROS:  Review of Systems   Constitutional: Negative for fever, chills, positive for myalgias and malaise/fatigue.   HENT: Negative for ear pain, nosebleeds, congestion, sore throat and neck pain.    Eyes: Negative for blurred vision.   Respiratory: Negative for cough, sputum production, shortness of breath and wheezing.    Cardiovascular: Negative for chest pain, palpitations, orthopnea and leg swelling.   Gastrointestinal: Negative for heartburn, nausea, vomiting and abdominal pain.   Genitourinary: Negative for dysuria, urgency and frequency.     Exam:  /82 (BP Location: Right arm, Patient Position: Sitting, BP Cuff Size: Large adult)   Pulse 80   Temp 36.4 °C (97.5 °F) (Temporal)   Resp 16   Ht 1.702 m (5' 7\")   Wt 117.9 kg (260 lb)   SpO2 96%   General:  Well nourished, well developed female in NAD  Head:Normocephalic, atraumatic  Eyes: PERRLA, EOM within normal limits, no conjunctival injection, no scleral icterus, visual fields and acuity grossly intact.  Ears: Normal shape and symmetry, no tenderness, no discharge. External canals are without any significant edema or erythema. Tympanic membranes are without any inflammation, no effusion.  Left tympanic membrane has chronic perforation and right tympanic membrane has tympanostomy tube in place and intact and patent.  Gross auditory acuity is intact  Nose: Symmetrical without tenderness, no discharge.  Mouth: reasonable hygiene, no erythema " exudates or tonsillar enlargement.  Neck: no masses, range of motion within normal limits, no tracheal deviation. No obvious thyroid enlargement.  Pulmonary: chest is symmetrical with respiration, no wheezes, crackles, or rhonchi.  Cardiovascular: regular rate and rhythm without murmurs, rubs, or gallops.  Extremities: no clubbing, cyanosis, or edema.    Please note that this dictation was created using voice recognition software. I have made every reasonable attempt to correct obvious errors, but I expect that there are errors of grammar and possibly content that I did not discover before finalizing the note.    Assessment/Plan:  1. Upper respiratory tract infection, unspecified type  COVID/SARS COV-2 PCR   2. Exposure to COVID-19 virus  COVID/SARS COV-2 PCR   Discussed strict isolation until Covid test returns, over-the-counter symptomatic relief as needed    Followup with primary care in the next 7-10 days if not significantly improving, return to the urgent care or go to the emergency room sooner for any worsening of symptoms.

## 2020-12-02 LAB
SARS-COV-2 RNA RESP QL NAA+PROBE: NOTDETECTED
SPECIMEN SOURCE: NORMAL

## 2020-12-04 ENCOUNTER — APPOINTMENT (OUTPATIENT)
Dept: RADIOLOGY | Facility: MEDICAL CENTER | Age: 50
End: 2020-12-04
Attending: FAMILY MEDICINE
Payer: MEDICAID

## 2020-12-04 DIAGNOSIS — Z12.31 VISIT FOR SCREENING MAMMOGRAM: ICD-10-CM

## 2021-02-11 ENCOUNTER — APPOINTMENT (OUTPATIENT)
Dept: LAB | Facility: MEDICAL CENTER | Age: 51
End: 2021-02-11
Attending: COLON & RECTAL SURGERY
Payer: MEDICAID

## 2021-02-17 ENCOUNTER — HOSPITAL ENCOUNTER (OUTPATIENT)
Dept: LAB | Facility: MEDICAL CENTER | Age: 51
End: 2021-02-17
Attending: PHYSICIAN ASSISTANT
Payer: MEDICAID

## 2021-02-17 LAB
ALBUMIN SERPL BCP-MCNC: 3.7 G/DL (ref 3.2–4.9)
ALBUMIN/GLOB SERPL: 1.2 G/DL
ALP SERPL-CCNC: 104 U/L (ref 30–99)
ALT SERPL-CCNC: 11 U/L (ref 2–50)
ANION GAP SERPL CALC-SCNC: 7 MMOL/L (ref 7–16)
AST SERPL-CCNC: 12 U/L (ref 12–45)
BASOPHILS # BLD AUTO: 0.4 % (ref 0–1.8)
BASOPHILS # BLD: 0.03 K/UL (ref 0–0.12)
BILIRUB SERPL-MCNC: 0.3 MG/DL (ref 0.1–1.5)
BUN SERPL-MCNC: 6 MG/DL (ref 8–22)
CALCIUM SERPL-MCNC: 8.5 MG/DL (ref 8.5–10.5)
CHLORIDE SERPL-SCNC: 103 MMOL/L (ref 96–112)
CHOLEST SERPL-MCNC: 111 MG/DL (ref 100–199)
CO2 SERPL-SCNC: 25 MMOL/L (ref 20–33)
CREAT SERPL-MCNC: 0.54 MG/DL (ref 0.5–1.4)
EOSINOPHIL # BLD AUTO: 0.07 K/UL (ref 0–0.51)
EOSINOPHIL NFR BLD: 1 % (ref 0–6.9)
ERYTHROCYTE [DISTWIDTH] IN BLOOD BY AUTOMATED COUNT: 40.6 FL (ref 35.9–50)
FASTING STATUS PATIENT QL REPORTED: NORMAL
GLOBULIN SER CALC-MCNC: 3 G/DL (ref 1.9–3.5)
GLUCOSE SERPL-MCNC: 92 MG/DL (ref 65–99)
HCT VFR BLD AUTO: 37.4 % (ref 37–47)
HDLC SERPL-MCNC: 48 MG/DL
HGB BLD-MCNC: 12.3 G/DL (ref 12–16)
IMM GRANULOCYTES # BLD AUTO: 0.01 K/UL (ref 0–0.11)
IMM GRANULOCYTES NFR BLD AUTO: 0.1 % (ref 0–0.9)
IRON SERPL-MCNC: 88 UG/DL (ref 40–170)
LDLC SERPL CALC-MCNC: 50 MG/DL
LYMPHOCYTES # BLD AUTO: 2.48 K/UL (ref 1–4.8)
LYMPHOCYTES NFR BLD: 35.2 % (ref 22–41)
MCH RBC QN AUTO: 29.4 PG (ref 27–33)
MCHC RBC AUTO-ENTMCNC: 32.9 G/DL (ref 33.6–35)
MCV RBC AUTO: 89.3 FL (ref 81.4–97.8)
MONOCYTES # BLD AUTO: 0.47 K/UL (ref 0–0.85)
MONOCYTES NFR BLD AUTO: 6.7 % (ref 0–13.4)
NEUTROPHILS # BLD AUTO: 3.98 K/UL (ref 2–7.15)
NEUTROPHILS NFR BLD: 56.6 % (ref 44–72)
NRBC # BLD AUTO: 0 K/UL
NRBC BLD-RTO: 0 /100 WBC
PLATELET # BLD AUTO: 283 K/UL (ref 164–446)
PMV BLD AUTO: 11.3 FL (ref 9–12.9)
POTASSIUM SERPL-SCNC: 4.5 MMOL/L (ref 3.6–5.5)
PREALB SERPL-MCNC: 18.7 MG/DL (ref 18–38)
PROT SERPL-MCNC: 6.7 G/DL (ref 6–8.2)
RBC # BLD AUTO: 4.19 M/UL (ref 4.2–5.4)
SODIUM SERPL-SCNC: 135 MMOL/L (ref 135–145)
TRANSFERRIN SERPL-MCNC: 312 MG/DL (ref 200–370)
TRIGL SERPL-MCNC: 66 MG/DL (ref 0–149)
WBC # BLD AUTO: 7 K/UL (ref 4.8–10.8)

## 2021-02-17 PROCEDURE — 84630 ASSAY OF ZINC: CPT

## 2021-02-17 PROCEDURE — 36415 COLL VENOUS BLD VENIPUNCTURE: CPT

## 2021-02-17 PROCEDURE — 84425 ASSAY OF VITAMIN B-1: CPT

## 2021-02-17 PROCEDURE — 84207 ASSAY OF VITAMIN B-6: CPT

## 2021-02-17 PROCEDURE — 82306 VITAMIN D 25 HYDROXY: CPT

## 2021-02-17 PROCEDURE — 80053 COMPREHEN METABOLIC PANEL: CPT

## 2021-02-17 PROCEDURE — 80061 LIPID PANEL: CPT

## 2021-02-17 PROCEDURE — 83540 ASSAY OF IRON: CPT

## 2021-02-17 PROCEDURE — 82746 ASSAY OF FOLIC ACID SERUM: CPT

## 2021-02-17 PROCEDURE — 85025 COMPLETE CBC W/AUTO DIFF WBC: CPT

## 2021-02-17 PROCEDURE — 84134 ASSAY OF PREALBUMIN: CPT

## 2021-02-17 PROCEDURE — 82728 ASSAY OF FERRITIN: CPT

## 2021-02-17 PROCEDURE — 84252 ASSAY OF VITAMIN B-2: CPT

## 2021-02-17 PROCEDURE — 84466 ASSAY OF TRANSFERRIN: CPT

## 2021-02-17 PROCEDURE — 82607 VITAMIN B-12: CPT

## 2021-02-18 ENCOUNTER — OFFICE VISIT (OUTPATIENT)
Dept: URGENT CARE | Facility: PHYSICIAN GROUP | Age: 51
End: 2021-02-18
Payer: MEDICAID

## 2021-02-18 VITALS
HEART RATE: 68 BPM | HEIGHT: 67 IN | WEIGHT: 288 LBS | BODY MASS INDEX: 45.2 KG/M2 | SYSTOLIC BLOOD PRESSURE: 120 MMHG | TEMPERATURE: 98.3 F | RESPIRATION RATE: 16 BRPM | DIASTOLIC BLOOD PRESSURE: 76 MMHG | OXYGEN SATURATION: 98 %

## 2021-02-18 DIAGNOSIS — H66.015 RECURRENT ACUTE SUPPURATIVE OTITIS MEDIA WITH SPONTANEOUS RUPTURE OF LEFT TYMPANIC MEMBRANE: ICD-10-CM

## 2021-02-18 DIAGNOSIS — T36.95XA ANTIBIOTIC-INDUCED YEAST INFECTION: ICD-10-CM

## 2021-02-18 DIAGNOSIS — B37.9 ANTIBIOTIC-INDUCED YEAST INFECTION: ICD-10-CM

## 2021-02-18 LAB
25(OH)D3 SERPL-MCNC: 23 NG/ML (ref 30–100)
FERRITIN SERPL-MCNC: 16.9 NG/ML (ref 10–291)
FOLATE SERPL-MCNC: 3.3 NG/ML
VIT B12 SERPL-MCNC: 403 PG/ML (ref 211–911)

## 2021-02-18 PROCEDURE — 99213 OFFICE O/P EST LOW 20 MIN: CPT | Performed by: PHYSICIAN ASSISTANT

## 2021-02-18 RX ORDER — FLUCONAZOLE 150 MG/1
TABLET ORAL
Qty: 2 TABLET | Refills: 0 | Status: SHIPPED | OUTPATIENT
Start: 2021-02-18 | End: 2021-03-03

## 2021-02-18 RX ORDER — AMOXICILLIN AND CLAVULANATE POTASSIUM 875; 125 MG/1; MG/1
1 TABLET, FILM COATED ORAL 2 TIMES DAILY
Qty: 14 TABLET | Refills: 0 | Status: SHIPPED | OUTPATIENT
Start: 2021-02-18 | End: 2021-02-25

## 2021-02-18 RX ORDER — HYDROCORTISONE 25 MG/G
CREAM TOPICAL
COMMUNITY
Start: 2020-07-14 | End: 2021-03-03

## 2021-02-18 ASSESSMENT — FIBROSIS 4 INDEX: FIB4 SCORE: 0.61

## 2021-02-18 NOTE — PROGRESS NOTES
Subjective:   Nneka Ruiz is a 50 y.o. female who presents for Otalgia (L ear pain, sinus and head pressure, x1day)      HPI  50 y.o. female presents to urgent care with new problem to provider of left sided ear pain, sinus pressure, and mild headache onset yesterday. Hx seasonal allergies. Denies cough, fevers, runny nose, sore throat. Mild congestion. Has not taken OTC medications for symptomatic relief. Denies other associated aggravating or alleviating factors.       Review of Systems   Constitutional: Negative for chills, fever and malaise/fatigue.   HENT: Positive for congestion, ear pain and sinus pain. Negative for ear discharge and sore throat.    Eyes: Negative for discharge and redness.   Respiratory: Negative for cough, sputum production, shortness of breath and wheezing.    Cardiovascular: Negative for chest pain and palpitations.   Gastrointestinal: Negative for abdominal pain, nausea and vomiting.   Musculoskeletal: Negative for myalgias.   Neurological: Negative for dizziness and headaches.   Endo/Heme/Allergies: Positive for environmental allergies.       Patient Active Problem List   Diagnosis   • Ruptured ear drum   • Depression   • Pedal edema   • Obesity   • Central perforation of tympanic membrane   • Tear of medial cartilage or meniscus of knee, current   • Morbid obesity (HCC)   • Stenosis of esophagus   • Morbid obesity with BMI of 45.0-49.9, adult (HCC)   • Alcoholism (HCC)   • Hypertension   • Gastroesophageal reflux disease without esophagitis   • Tobacco use   • Fracture of left humerus     Past Surgical History:   Procedure Laterality Date   • GASTRIC BYPASS LAPAROSCOPIC  3/20/2019    Procedure: GASTRIC BYPASS LAPAROSCOPIC- REVISION OF GASTRIC RESTRICTION WITH SINGLE ANASTAMOSIS DUODENAL- INTESTINAL BYPASS;  Surgeon: Keegan Hernandez M.D.;  Location: SURGERY [facility];  Service: General   • HIATAL HERNIA REPAIR  3/20/2019    Procedure: HIATAL HERNIA REPAIR- POSSIBLE OPEN;   "Surgeon: Keegan Hernandez M.D.;  Location: SURGERY [facility];  Service: General   • HUMERUS ORIF Left 3/1/2019    Procedure: HUMERUS ORIF;  Surgeon: Bandar Block M.D.;  Location: SURGERY [facility];  Service: Orthopedics   • GASTROSCOPY  2017    Procedure: GASTROSCOPY W/DILATATION, 30-MM ACHALASIA;  Surgeon: Keegan Hernandez M.D.;  Location: SURGERY [facility];  Service:    • GASTRIC SLEEVE LAPAROSCOPY N/A 2/3/2016    Procedure: GASTRIC SLEEVE LAPAROSCOPY AND LIVER BIOPSY;  Surgeon: Keegan Hernandez M.D.;  Location: SURGERY [facility];  Service:    • KNEE ARTHROSCOPY  12/10/2014    Performed by Mikel Spencer M.D. at [facility]   • MEDIAL MENISCECTOMY  12/10/2014    Performed by Mikel Spencer M.D. at [facility]   • MENISCECTOMY  12/10/2014    Performed by Mikel Spencer M.D. at [facility]   • LATERAL RELEASE  12/10/2014    Performed by Mikel Spencer M.D. at [facility]   • TYMPANOPLASTY  2012    Performed by Jonn Kaufman M.D. at SURGERY [facility] DAY [facility]   • GYN SURGERY      D&C-IUD   • TONSILLECTOMY     • TUBAL LIGATION       Social History     Tobacco Use   • Smoking status: Current Every Day Smoker     Packs/day: 1.00     Years: 12.00     Pack years: 12.00     Types: Cigarettes     Last attempt to quit: 2019     Years since quittin.0   • Smokeless tobacco: Never Used   • Tobacco comment: 2 yrs   ppd   Substance Use Topics   • Alcohol use: Yes     Comment: 1 pint of whiskey nightly    • Drug use: No     Comment: meth  none since       Family History   Problem Relation Age of Onset   • Breast Cancer Father       (Allergies, Medications, & Tobacco/Substance Use were reconciled by the Medical Assistant and reviewed by myself. The family history is prepopulated)     Objective:     /76   Pulse 68   Temp 36.8 °C (98.3 °F) (Temporal)   Resp 16   Ht 1.702 m (5' 7\")   Wt (!) 131 kg (288 " lb)   SpO2 98%   BMI 45.11 kg/m²     Physical Exam  Vitals reviewed.   Constitutional:       General: She is not in acute distress.     Appearance: Normal appearance. She is well-developed. She is not ill-appearing.   HENT:      Head: Normocephalic and atraumatic.      Right Ear: Ear canal and external ear normal. Tympanic membrane is injected.      Left Ear: Ear canal and external ear normal. Tympanic membrane is injected and perforated.      Nose: Nose normal.      Comments: Mild congestion. No sinus tenderness     Mouth/Throat:      Mouth: Mucous membranes are moist.      Pharynx: Oropharynx is clear.      Comments: Minimal oropharyngeal erythema   Eyes:      Conjunctiva/sclera: Conjunctivae normal.   Cardiovascular:      Rate and Rhythm: Normal rate and regular rhythm.      Heart sounds: Normal heart sounds.   Pulmonary:      Effort: Pulmonary effort is normal. No respiratory distress.      Breath sounds: Normal breath sounds.   Musculoskeletal:      Cervical back: Normal range of motion and neck supple.   Skin:     General: Skin is warm and dry.      Findings: No erythema.   Neurological:      General: No focal deficit present.      Mental Status: She is alert and oriented to person, place, and time.   Psychiatric:         Mood and Affect: Mood normal.         Behavior: Behavior normal.         Thought Content: Thought content normal.         Judgment: Judgment normal.         Assessment/Plan:     1. Recurrent acute suppurative otitis media with spontaneous rupture of left tympanic membrane  amoxicillin-clavulanate (AUGMENTIN) 875-125 MG Tab   2. Antibiotic-induced yeast infection  fluconazole (DIFLUCAN) 150 MG tablet     Recommend follow up with ENT as scheduled. OTC tylenol for pain. May take daily OTC allergy medication such as zyrtec.     Differential diagnosis, natural history, supportive care, and indications for immediate follow-up discussed.    Advised the patient to follow-up with the primary care  physician for recheck, reevaluation, and consideration of further management.  Patient verbalized understanding of treatment plan and has no further questions regarding care.     Please note that this dictation was created using voice recognition software. I have made a reasonable attempt to correct obvious errors, but I expect that there are errors of grammar and possibly content that I did not discover before finalizing the note.    This note was electronically signed by Leanna Strong PA-C

## 2021-02-19 NOTE — ED NOTES
Pt continues to sleep   Render In Strict Bullet Format?: No Detail Level: Zone Initiate Treatment: Ketaconazole shampoo, muprician ointment and hydroxyzine Continue Regimen: Erythromycin topical sol

## 2021-02-20 LAB — ZINC BLD-MCNC: 468.4 UG/DL (ref 440–860)

## 2021-02-21 LAB
VIT B1 BLD-MCNC: 92 NMOL/L (ref 70–180)
VIT B2 SERPL-SCNC: 15 NMOL/L (ref 5–50)

## 2021-02-22 LAB — VIT B6 SERPL-MCNC: 37.8 NMOL/L (ref 20–125)

## 2021-02-23 ASSESSMENT — ENCOUNTER SYMPTOMS
WHEEZING: 0
EYE DISCHARGE: 0
PALPITATIONS: 0
SINUS PAIN: 1
COUGH: 0
HEADACHES: 0
NAUSEA: 0
DIZZINESS: 0
CHILLS: 0
SORE THROAT: 0
SHORTNESS OF BREATH: 0
EYE REDNESS: 0
ABDOMINAL PAIN: 0
SPUTUM PRODUCTION: 0
VOMITING: 0
FEVER: 0
MYALGIAS: 0

## 2021-02-26 ENCOUNTER — OFFICE VISIT (OUTPATIENT)
Dept: URGENT CARE | Facility: PHYSICIAN GROUP | Age: 51
End: 2021-02-26
Payer: MEDICAID

## 2021-02-26 VITALS
HEIGHT: 67 IN | BODY MASS INDEX: 44.57 KG/M2 | HEART RATE: 81 BPM | OXYGEN SATURATION: 96 % | RESPIRATION RATE: 16 BRPM | WEIGHT: 284 LBS | DIASTOLIC BLOOD PRESSURE: 70 MMHG | SYSTOLIC BLOOD PRESSURE: 120 MMHG | TEMPERATURE: 97.5 F

## 2021-02-26 DIAGNOSIS — R10.2 PELVIC PAIN: ICD-10-CM

## 2021-02-26 LAB
APPEARANCE UR: CLEAR
BILIRUB UR STRIP-MCNC: NEGATIVE MG/DL
COLOR UR AUTO: NORMAL
GLUCOSE UR STRIP.AUTO-MCNC: NEGATIVE MG/DL
INT CON NEG: NORMAL
INT CON POS: NORMAL
KETONES UR STRIP.AUTO-MCNC: NEGATIVE MG/DL
LEUKOCYTE ESTERASE UR QL STRIP.AUTO: NEGATIVE
NITRITE UR QL STRIP.AUTO: NEGATIVE
PH UR STRIP.AUTO: 5.5 [PH] (ref 5–8)
POC URINE PREGNANCY TEST: NEGATIVE
PROT UR QL STRIP: NEGATIVE MG/DL
RBC UR QL AUTO: NEGATIVE
SP GR UR STRIP.AUTO: 1.02
UROBILINOGEN UR STRIP-MCNC: NEGATIVE MG/DL

## 2021-02-26 PROCEDURE — 99214 OFFICE O/P EST MOD 30 MIN: CPT | Mod: 25 | Performed by: FAMILY MEDICINE

## 2021-02-26 PROCEDURE — 81025 URINE PREGNANCY TEST: CPT | Performed by: FAMILY MEDICINE

## 2021-02-26 PROCEDURE — 81002 URINALYSIS NONAUTO W/O SCOPE: CPT | Performed by: FAMILY MEDICINE

## 2021-02-26 ASSESSMENT — ENCOUNTER SYMPTOMS
MYALGIAS: 0
WEIGHT LOSS: 0
EYE REDNESS: 0
VOMITING: 0
NAUSEA: 0
EYE DISCHARGE: 0

## 2021-02-26 ASSESSMENT — FIBROSIS 4 INDEX: FIB4 SCORE: 0.61

## 2021-02-26 NOTE — PROGRESS NOTES
"Subjective:      Nneka Ruiz is a 50 y.o. female who presents with Abdominal Pain (Lower abdominal pain, more right than left)            5 days pelvic pain. 9/10 severity today. No radiation. Cramping in nature. Heat and aleve are slightly helpful. She has a Mirena IUD and irregular menses. No dysuria. No hematuria. +PMH ovarian cyst with similar pain. No abdominal pain. No loss of appetite. No N/V. PSxH gastric sleeve and partial bypass, , tubal ligation. No other abd surgery. IUD is to control bleeding.  No other aggravating or alleviating factors.          Review of Systems   Constitutional: Negative for malaise/fatigue and weight loss.   Eyes: Negative for discharge and redness.   Gastrointestinal: Negative for nausea and vomiting.   Musculoskeletal: Negative for joint pain and myalgias.   Skin: Negative for itching and rash.     .  Medications, Allergies, and current problem list reviewed today in Epic         Objective:     /70   Pulse 81   Temp 36.4 °C (97.5 °F) (Temporal)   Resp 16   Ht 1.702 m (5' 7\")   Wt (!) 129 kg (284 lb)   SpO2 96%   BMI 44.48 kg/m²      Physical Exam  Constitutional:       General: She is not in acute distress.     Appearance: She is well-developed.   HENT:      Head: Normocephalic and atraumatic.   Eyes:      Conjunctiva/sclera: Conjunctivae normal.   Cardiovascular:      Rate and Rhythm: Normal rate and regular rhythm.      Heart sounds: Normal heart sounds. No murmur.   Pulmonary:      Effort: Pulmonary effort is normal.      Breath sounds: Normal breath sounds. No wheezing.   Abdominal:      Palpations: Abdomen is soft.      Tenderness: There is no abdominal tenderness.   Genitourinary:     Comments: Tender right side of suprapubic region.  No CVA tenderness.  Skin:     General: Skin is warm and dry.      Findings: No rash.   Neurological:      Mental Status: She is alert and oriented to person, place, and time.                 Assessment/Plan: "      UA reviewed  Pregnancy negative    1. Pelvic pain  US-PELVIC COMPLETE (TRANSABDOMINAL/TRANSVAGINAL) (COMBO)    POCT Urinalysis    POCT PREGNANCY     Differential diagnosis, natural history, supportive care, and indications for immediate follow-up discussed at length.     Ultrasound is not available for 4 days.  Differential diagnosis of severe pelvic pain includes ovarian torsion.  Recommend to the ED for further evaluation.  Patient understands this and will go if pain remains severe.

## 2021-03-03 ENCOUNTER — OFFICE VISIT (OUTPATIENT)
Dept: URGENT CARE | Facility: PHYSICIAN GROUP | Age: 51
End: 2021-03-03
Payer: MEDICAID

## 2021-03-03 ENCOUNTER — HOSPITAL ENCOUNTER (OUTPATIENT)
Facility: MEDICAL CENTER | Age: 51
End: 2021-03-03
Attending: NURSE PRACTITIONER
Payer: MEDICAID

## 2021-03-03 VITALS
WEIGHT: 292.6 LBS | SYSTOLIC BLOOD PRESSURE: 152 MMHG | BODY MASS INDEX: 44.34 KG/M2 | RESPIRATION RATE: 18 BRPM | TEMPERATURE: 98.1 F | HEIGHT: 68 IN | HEART RATE: 71 BPM | DIASTOLIC BLOOD PRESSURE: 92 MMHG | OXYGEN SATURATION: 96 %

## 2021-03-03 DIAGNOSIS — J22 LRTI (LOWER RESPIRATORY TRACT INFECTION): ICD-10-CM

## 2021-03-03 DIAGNOSIS — R05.9 COUGH: ICD-10-CM

## 2021-03-03 PROCEDURE — U0003 INFECTIOUS AGENT DETECTION BY NUCLEIC ACID (DNA OR RNA); SEVERE ACUTE RESPIRATORY SYNDROME CORONAVIRUS 2 (SARS-COV-2) (CORONAVIRUS DISEASE [COVID-19]), AMPLIFIED PROBE TECHNIQUE, MAKING USE OF HIGH THROUGHPUT TECHNOLOGIES AS DESCRIBED BY CMS-2020-01-R: HCPCS

## 2021-03-03 PROCEDURE — U0005 INFEC AGEN DETEC AMPLI PROBE: HCPCS

## 2021-03-03 PROCEDURE — 99214 OFFICE O/P EST MOD 30 MIN: CPT | Performed by: NURSE PRACTITIONER

## 2021-03-03 RX ORDER — CARIPRAZINE 1.5 MG/1
CAPSULE, GELATIN COATED ORAL
COMMUNITY
Start: 2021-02-25 | End: 2021-12-08

## 2021-03-03 RX ORDER — PREDNISONE 10 MG/1
40 TABLET ORAL DAILY
Qty: 20 TABLET | Refills: 0 | Status: SHIPPED | OUTPATIENT
Start: 2021-03-03 | End: 2021-03-08

## 2021-03-03 RX ORDER — AZITHROMYCIN 250 MG/1
TABLET, FILM COATED ORAL
Qty: 6 TABLET | Refills: 0 | Status: SHIPPED | OUTPATIENT
Start: 2021-03-03 | End: 2021-12-08

## 2021-03-03 RX ORDER — ALBUTEROL SULFATE 90 UG/1
2 AEROSOL, METERED RESPIRATORY (INHALATION) EVERY 6 HOURS PRN
Qty: 8.5 G | Refills: 0 | Status: SHIPPED | OUTPATIENT
Start: 2021-03-03 | End: 2021-12-08

## 2021-03-03 ASSESSMENT — ENCOUNTER SYMPTOMS
FEVER: 1
COUGH: 1
SPUTUM PRODUCTION: 0
WHEEZING: 0
HEADACHES: 1
VOMITING: 0
SORE THROAT: 1
ABDOMINAL PAIN: 0
EYE PAIN: 0
CHILLS: 1
RHINORRHEA: 1
NAUSEA: 0
DIZZINESS: 0
SHORTNESS OF BREATH: 1
MYALGIAS: 0

## 2021-03-03 ASSESSMENT — FIBROSIS 4 INDEX: FIB4 SCORE: 0.61

## 2021-03-03 NOTE — PROGRESS NOTES
Subjective:   Nneka Ruiz is a 50 y.o. female who presents for Coronavirus Screening (Fever, Sore throat, cough, and tight chest, body aches x2 days. )      URI   This is a new problem. Episode onset: 2 days; no sick conctacs. The problem has been gradually worsening. The maximum temperature recorded prior to her arrival was 100.4 - 100.9 F. Associated symptoms include congestion, coughing, headaches, rhinorrhea and a sore throat. Pertinent negatives include no abdominal pain, chest pain, dysuria, ear pain, joint pain, nausea, plugged ear sensation, rash, vomiting or wheezing. She has tried acetaminophen and sleep for the symptoms. The treatment provided no relief.       Review of Systems   Constitutional: Positive for chills, fever and malaise/fatigue.   HENT: Positive for congestion, rhinorrhea and sore throat. Negative for ear pain.    Eyes: Negative for pain.   Respiratory: Positive for cough and shortness of breath. Negative for sputum production and wheezing.    Cardiovascular: Negative for chest pain.   Gastrointestinal: Negative for abdominal pain, nausea and vomiting.   Genitourinary: Negative for dysuria and hematuria.   Musculoskeletal: Negative for joint pain and myalgias.   Skin: Negative for rash.   Neurological: Positive for headaches. Negative for dizziness.       Medications:    • albuterol Aers  • azithromycin Tabs  • busPIRone Tabs  • fluconazole  • lisinopril-hydrochlorothiazide  • Mirena (52 MG) Iud  • omeprazole  • predniSONE Tabs  • SEROQUEL P  • TOPAMAX PO  • TURMERIC PO  • Vraylar Caps  • VRAYLAR PO    Allergies: Aspirin, Other misc, and Nsaids    Problem List: Nneka Ruiz has Ruptured ear drum; Depression; Pedal edema; Obesity; Central perforation of tympanic membrane; Tear of medial cartilage or meniscus of knee, current; Morbid obesity (HCC); Stenosis of esophagus; Morbid obesity with BMI of 45.0-49.9, adult (HCC); Alcoholism (HCC); Hypertension; Gastroesophageal  reflux disease without esophagitis; Tobacco use; and Fracture of left humerus on their problem list.    Surgical History:  Past Surgical History:   Procedure Laterality Date   • GASTRIC BYPASS LAPAROSCOPIC  3/20/2019    Procedure: GASTRIC BYPASS LAPAROSCOPIC- REVISION OF GASTRIC RESTRICTION WITH SINGLE ANASTAMOSIS DUODENAL- INTESTINAL BYPASS;  Surgeon: Keegan Hernandez M.D.;  Location: SURGERY [facility];  Service: General   • HIATAL HERNIA REPAIR  3/20/2019    Procedure: HIATAL HERNIA REPAIR- POSSIBLE OPEN;  Surgeon: Keegan Hernandez M.D.;  Location: SURGERY [facility];  Service: General   • HUMERUS ORIF Left 3/1/2019    Procedure: HUMERUS ORIF;  Surgeon: Bandar Block M.D.;  Location: SURGERY [facility];  Service: Orthopedics   • GASTROSCOPY  5/17/2017    Procedure: GASTROSCOPY W/DILATATION, 30-MM ACHALASIA;  Surgeon: Keegan Hernandez M.D.;  Location: SURGERY [facility];  Service:    • GASTRIC SLEEVE LAPAROSCOPY N/A 2/3/2016    Procedure: GASTRIC SLEEVE LAPAROSCOPY AND LIVER BIOPSY;  Surgeon: Keegan Hernandez M.D.;  Location: SURGERY [facility];  Service:    • KNEE ARTHROSCOPY  12/10/2014    Performed by Mikel Spencer M.D. at [facility]   • MEDIAL MENISCECTOMY  12/10/2014    Performed by Mikel Spencer M.D. at [facility]   • MENISCECTOMY  12/10/2014    Performed by Mikel Spencer M.D. at [facility]   • LATERAL RELEASE  12/10/2014    Performed by Mikel Spencer M.D. at [facility]   • TYMPANOPLASTY  11/6/2012    Performed by Jonn Kaufman M.D. at SURGERY [facility] DAY [facility]   • GYN SURGERY      D&C-IUD   • TONSILLECTOMY     • TUBAL LIGATION         Past Social Hx: Nneka Ruiz  reports that she has been smoking cigarettes. She has a 12.00 pack-year smoking history. She has never used smokeless tobacco. She reports current alcohol use. She reports that she does not use drugs.     Past Family Hx:  Nneka Aguirre  "Joseph family history includes Breast Cancer in her father.     Problem list, medications, and allergies reviewed by myself today in Epic.     Objective:     /92   Pulse 71   Temp 36.7 °C (98.1 °F) (Temporal)   Resp 18   Ht 1.727 m (5' 8\")   Wt (!) 133 kg (292 lb 9.6 oz)   SpO2 96%   BMI 44.49 kg/m²     Physical Exam  Vitals and nursing note reviewed.   Constitutional:       General: She is not in acute distress.     Appearance: She is well-developed. She is ill-appearing.   HENT:      Head: Normocephalic and atraumatic.      Right Ear: External ear normal.      Left Ear: External ear normal.      Nose: Nose normal.      Mouth/Throat:      Mouth: Mucous membranes are moist.   Eyes:      Conjunctiva/sclera: Conjunctivae normal.   Cardiovascular:      Rate and Rhythm: Normal rate.   Pulmonary:      Effort: Pulmonary effort is normal. No accessory muscle usage or respiratory distress.      Breath sounds: Wheezing present. No decreased breath sounds, rhonchi or rales.   Abdominal:      General: There is no distension.   Musculoskeletal:         General: Normal range of motion.   Skin:     General: Skin is warm and dry.   Neurological:      General: No focal deficit present.      Mental Status: She is alert and oriented to person, place, and time. Mental status is at baseline.      Gait: Gait (gait at baseline) normal.   Psychiatric:         Judgment: Judgment normal.         Assessment/Plan:     Diagnosis and associated orders:     1. LRTI (lower respiratory tract infection)  predniSONE (DELTASONE) 10 MG Tab    azithromycin (ZITHROMAX) 250 MG Tab    albuterol 108 (90 Base) MCG/ACT Aero Soln inhalation aerosol    COVID/SARS CoV-2 PCR   2. Cough  COVID/SARS CoV-2 PCR      Comments/MDM:     Patient is a 50-year-old female present with the stated above, diffuse wheezing audible throughout, pulse ox is adequate, patient is ill-appearing, she will be started on antibiotic therapy and oral steroids however did " discuss possibility of COVID-19 infection which is viral.  Provided patient with self-isolation instructions  Provided ER precautions including worsening shortness of breath and high fever  Stressed the seriousness of isolation and prevention of transmissible disease  Instructed to take 1000 mg of Tylenol 3 times per day Differential diagnosis, natural history, supportive care, and indications for immediate follow-up discussed. u  •   •          Please note that this dictation was created using voice recognition software. I have made a reasonable attempt to correct obvious errors, but I expect that there are errors of grammar and possibly content that I did not discover before finalizing the note.    This note was electronically signed by Edd WIN.

## 2021-03-04 LAB
COVID ORDER STATUS COVID19: NORMAL
SARS-COV-2 RNA RESP QL NAA+PROBE: NOTDETECTED
SPECIMEN SOURCE: NORMAL

## 2021-04-06 ENCOUNTER — IMMUNIZATION (OUTPATIENT)
Dept: FAMILY PLANNING/WOMEN'S HEALTH CLINIC | Facility: IMMUNIZATION CENTER | Age: 51
End: 2021-04-06
Payer: MEDICAID

## 2021-04-06 DIAGNOSIS — Z23 ENCOUNTER FOR VACCINATION: Primary | ICD-10-CM

## 2021-04-07 PROCEDURE — 0001A PFIZER SARS-COV-2 VACCINE: CPT | Performed by: INTERNAL MEDICINE

## 2021-04-07 PROCEDURE — 91300 PFIZER SARS-COV-2 VACCINE: CPT | Performed by: INTERNAL MEDICINE

## 2021-04-30 ENCOUNTER — IMMUNIZATION (OUTPATIENT)
Dept: FAMILY PLANNING/WOMEN'S HEALTH CLINIC | Facility: IMMUNIZATION CENTER | Age: 51
End: 2021-04-30
Payer: MEDICAID

## 2021-04-30 DIAGNOSIS — Z23 ENCOUNTER FOR VACCINATION: Primary | ICD-10-CM

## 2021-04-30 PROCEDURE — 0002A PFIZER SARS-COV-2 VACCINE: CPT

## 2021-04-30 PROCEDURE — 91300 PFIZER SARS-COV-2 VACCINE: CPT

## 2021-05-11 ENCOUNTER — HOSPITAL ENCOUNTER (OUTPATIENT)
Facility: MEDICAL CENTER | Age: 51
End: 2021-05-11
Attending: PHYSICIAN ASSISTANT
Payer: MEDICAID

## 2021-05-11 ENCOUNTER — OFFICE VISIT (OUTPATIENT)
Dept: URGENT CARE | Facility: PHYSICIAN GROUP | Age: 51
End: 2021-05-11
Payer: MEDICAID

## 2021-05-11 VITALS
OXYGEN SATURATION: 98 % | TEMPERATURE: 98.4 F | BODY MASS INDEX: 45.83 KG/M2 | HEIGHT: 67 IN | DIASTOLIC BLOOD PRESSURE: 84 MMHG | HEART RATE: 70 BPM | RESPIRATION RATE: 16 BRPM | SYSTOLIC BLOOD PRESSURE: 128 MMHG | WEIGHT: 292 LBS

## 2021-05-11 DIAGNOSIS — R39.15 URINARY URGENCY: ICD-10-CM

## 2021-05-11 DIAGNOSIS — N30.01 ACUTE CYSTITIS WITH HEMATURIA: ICD-10-CM

## 2021-05-11 DIAGNOSIS — R35.0 URINARY FREQUENCY: ICD-10-CM

## 2021-05-11 DIAGNOSIS — R30.0 DYSURIA: ICD-10-CM

## 2021-05-11 LAB
APPEARANCE UR: NORMAL
BILIRUB UR STRIP-MCNC: NORMAL MG/DL
COLOR UR AUTO: YELLOW
GLUCOSE UR STRIP.AUTO-MCNC: NORMAL MG/DL
KETONES UR STRIP.AUTO-MCNC: NORMAL MG/DL
LEUKOCYTE ESTERASE UR QL STRIP.AUTO: NORMAL
NITRITE UR QL STRIP.AUTO: NORMAL
PH UR STRIP.AUTO: 6.5 [PH] (ref 5–8)
PROT UR QL STRIP: 30 MG/DL
RBC UR QL AUTO: NORMAL
SP GR UR STRIP.AUTO: 1.01
UROBILINOGEN UR STRIP-MCNC: 0.2 MG/DL

## 2021-05-11 PROCEDURE — 87186 SC STD MICRODIL/AGAR DIL: CPT

## 2021-05-11 PROCEDURE — 87086 URINE CULTURE/COLONY COUNT: CPT

## 2021-05-11 PROCEDURE — 81002 URINALYSIS NONAUTO W/O SCOPE: CPT | Performed by: PHYSICIAN ASSISTANT

## 2021-05-11 PROCEDURE — 87077 CULTURE AEROBIC IDENTIFY: CPT

## 2021-05-11 PROCEDURE — 99213 OFFICE O/P EST LOW 20 MIN: CPT | Mod: 25 | Performed by: PHYSICIAN ASSISTANT

## 2021-05-11 RX ORDER — PHENAZOPYRIDINE HYDROCHLORIDE 200 MG/1
200 TABLET, FILM COATED ORAL 3 TIMES DAILY PRN
Qty: 10 TABLET | Refills: 0 | Status: SHIPPED | OUTPATIENT
Start: 2021-05-11 | End: 2021-12-08

## 2021-05-11 RX ORDER — CEFDINIR 300 MG/1
300 CAPSULE ORAL EVERY 12 HOURS
Qty: 10 CAPSULE | Refills: 0 | Status: SHIPPED | OUTPATIENT
Start: 2021-05-11 | End: 2021-05-16

## 2021-05-11 ASSESSMENT — FIBROSIS 4 INDEX: FIB4 SCORE: 0.61

## 2021-05-11 NOTE — PROGRESS NOTES
Chief Complaint   Patient presents with   • UTI     x        HISTORY OF PRESENT ILLNESS: Patient is a 50 y.o. female who presents today for the following:    Dysuria x2 days  Increased urinary frequency and urgency  Denies fever, abdominal pain, flank pain, fever, nausea, vomit  Denies history of UTI    Patient Active Problem List    Diagnosis Date Noted   • Fracture of left humerus 03/01/2019   • Tobacco use 03/01/2019   • Obesity 08/29/2012   • Alcoholism ([facility]) 03/01/2019   • Hypertension 03/01/2019   • Gastroesophageal reflux disease without esophagitis 03/01/2019   • Depression 08/29/2012   • Morbid obesity with BMI of 45.0-49.9, adult ([facility]) 12/20/2017   • Stenosis of esophagus 05/17/2017   • Morbid obesity ([facility]) 02/03/2016   • Tear of medial cartilage or meniscus of knee, current 12/10/2014   • Central perforation of tympanic membrane 11/06/2012   • Ruptured ear drum 08/29/2012   • Pedal edema 08/29/2012       Allergies:Aspirin, Other misc, and Nsaids    Current Outpatient Medications Ordered in Epic   Medication Sig Dispense Refill   • cefdinir (OMNICEF) 300 MG Cap Take 1 capsule by mouth every 12 hours for 5 days. 10 capsule 0   • phenazopyridine (PYRIDIUM) 200 MG Tab Take 1 tablet by mouth 3 times a day as needed for Mild Pain or Moderate Pain. 10 tablet 0   • VRAYLAR 1.5 MG Cap      • azithromycin (ZITHROMAX) 250 MG Tab Take 2 tablets by mouth on day one. Take one tablet by mouth the remaining days until gone 6 tablet 0   • albuterol 108 (90 Base) MCG/ACT Aero Soln inhalation aerosol Inhale 2 Puffs every 6 hours as needed for Shortness of Breath. 8.5 g 0   • Cariprazine HCl (VRAYLAR PO) Take  by mouth.     • busPIRone (BUSPAR) 10 MG Tab tablet buspirone 10 mg tablet   TK 1 T PO BID     • levonorgestrel (MIRENA, 52 MG,) 52 mg (20 mcg/24 hr) IUD Mirena 20 mcg/24 hours (5 yrs) 52 mg intrauterine device     • lisinopril-hydrochlorothiazide (PRINZIDE) 20-12.5 MG per tablet lisinopril 20 mg-hydrochlorothiazide  "12.5 mg tablet   TK 1 T PO QD     • TURMERIC PO Take 2 Caps by mouth every day.     • omeprazole (PRILOSEC) 20 MG delayed-release capsule Take 20 mg by mouth 2 times a day as needed.       No current Epic-ordered facility-administered medications on file.       Past Medical History:   Diagnosis Date   • Anemia     during pregnancy   • Anxiety    • Bowel habit changes     constipation   • Cancer (HCC)     uterine   • Cold    • Dental disorder     lower partial   • Gynecological disorder     menorrhagia   • Heart burn    • Hiatus hernia syndrome    • Hypertension    • Pain     left knee   • Psychiatric problem     anxiety/depression       Social History     Tobacco Use   • Smoking status: Current Every Day Smoker     Packs/day: 1.00     Years: 12.00     Pack years: 12.00     Types: Cigarettes     Last attempt to quit: 2019     Years since quittin.2   • Smokeless tobacco: Never Used   • Tobacco comment: 2 yrs   ppd   Substance Use Topics   • Alcohol use: Yes     Comment: 1 pint of whiskey nightly    • Drug use: No     Comment: meth  none since        Family Status   Relation Name Status   • Mo  Alive   • Fa  Alive     Family History   Problem Relation Age of Onset   • Breast Cancer Father        Review of Systems:     Constitutional ROS: No unexpected change in weight, No weakness, No fatigue  Pulmonary ROS: No chronic cough, sputum, or hemoptysis, No dyspnea on exertion, No wheezing  Cardiovascular ROS: No diaphoresis, No edema, No palpitations  Gastrointestinal ROS: No change in bowel habits, No significant change in appetite, No nausea, vomiting, diarrhea, or constipation  Hematologic/Lymphatic ROS: No chills, No night sweats, No weight loss  Skin/Integumentary ROS: No edema, No evidence of rash, No itching      Exam:  /84   Pulse 70   Temp 36.9 °C (98.4 °F) (Temporal)   Resp 16   Ht 1.702 m (5' 7\")   Wt (!) 132 kg (292 lb)   SpO2 98%   General: Well developed, well nourished. No " distress.  Pulmonary: Unlabored respiratory effort.   Back: No CVA tenderness noted.  Neurologic: Grossly nonfocal. No facial asymmetry noted.  Skin: Warm, dry, good turgor. No rashes in visible areas.   Psych: Normal mood. Alert and oriented x3. Judgment and insight is normal.    UA: Large blood, 30 protein, large leukocyte esterase    Assessment/Plan:  Drink plenty of fluids. Will contact patient with culture results. Use all medication as directed. Follow up for worsening or persistent symptoms.  1. Acute cystitis with hematuria  cefdinir (OMNICEF) 300 MG Cap    phenazopyridine (PYRIDIUM) 200 MG Tab    URINE CULTURE(NEW)   2. Dysuria  cefdinir (OMNICEF) 300 MG Cap    phenazopyridine (PYRIDIUM) 200 MG Tab    URINE CULTURE(NEW)   3. Urinary frequency  cefdinir (OMNICEF) 300 MG Cap    phenazopyridine (PYRIDIUM) 200 MG Tab    URINE CULTURE(NEW)   4. Urinary urgency  cefdinir (OMNICEF) 300 MG Cap    phenazopyridine (PYRIDIUM) 200 MG Tab    URINE CULTURE(NEW)

## 2021-09-07 ENCOUNTER — OFFICE VISIT (OUTPATIENT)
Dept: URGENT CARE | Facility: PHYSICIAN GROUP | Age: 51
End: 2021-09-07
Payer: MEDICAID

## 2021-09-07 ENCOUNTER — HOSPITAL ENCOUNTER (OUTPATIENT)
Facility: MEDICAL CENTER | Age: 51
End: 2021-09-07
Attending: PHYSICIAN ASSISTANT
Payer: MEDICAID

## 2021-09-07 VITALS
OXYGEN SATURATION: 97 % | WEIGHT: 293 LBS | SYSTOLIC BLOOD PRESSURE: 128 MMHG | RESPIRATION RATE: 14 BRPM | HEIGHT: 66 IN | BODY MASS INDEX: 47.09 KG/M2 | DIASTOLIC BLOOD PRESSURE: 76 MMHG | HEART RATE: 84 BPM | TEMPERATURE: 97.9 F

## 2021-09-07 DIAGNOSIS — J01.00 ACUTE NON-RECURRENT MAXILLARY SINUSITIS: ICD-10-CM

## 2021-09-07 DIAGNOSIS — R05.9 COUGH: ICD-10-CM

## 2021-09-07 DIAGNOSIS — J02.9 SORE THROAT: ICD-10-CM

## 2021-09-07 PROCEDURE — U0003 INFECTIOUS AGENT DETECTION BY NUCLEIC ACID (DNA OR RNA); SEVERE ACUTE RESPIRATORY SYNDROME CORONAVIRUS 2 (SARS-COV-2) (CORONAVIRUS DISEASE [COVID-19]), AMPLIFIED PROBE TECHNIQUE, MAKING USE OF HIGH THROUGHPUT TECHNOLOGIES AS DESCRIBED BY CMS-2020-01-R: HCPCS

## 2021-09-07 PROCEDURE — 99214 OFFICE O/P EST MOD 30 MIN: CPT | Performed by: PHYSICIAN ASSISTANT

## 2021-09-07 PROCEDURE — U0005 INFEC AGEN DETEC AMPLI PROBE: HCPCS

## 2021-09-07 RX ORDER — AMOXICILLIN AND CLAVULANATE POTASSIUM 875; 125 MG/1; MG/1
1 TABLET, FILM COATED ORAL 2 TIMES DAILY
Qty: 14 TABLET | Refills: 0 | Status: SHIPPED | OUTPATIENT
Start: 2021-09-07 | End: 2021-09-14

## 2021-09-07 ASSESSMENT — ENCOUNTER SYMPTOMS
DIARRHEA: 0
PALPITATIONS: 0
FEVER: 1
HEADACHES: 1
WHEEZING: 0
SHORTNESS OF BREATH: 0
CHILLS: 0
COUGH: 1
ABDOMINAL PAIN: 0
SORE THROAT: 1
RHINORRHEA: 0
VOMITING: 0
NAUSEA: 0
DIZZINESS: 0
MYALGIAS: 0

## 2021-09-07 ASSESSMENT — FIBROSIS 4 INDEX: FIB4 SCORE: 0.63

## 2021-09-07 NOTE — PROGRESS NOTES
Subjective     Nneka Ruiz is a 51 y.o. female who presents with Body Aches (x7 days, exposure at work), Fever, and Congestion (headache)            Sinusitis type symptoms for the last week.  History of recurrent sinusitis.  Mild cough.  Positive exposure to Covid at work last week.  Patient is fully vaccinated.    Cough  This is a new problem. The current episode started in the past 7 days. The problem has been unchanged. The problem occurs every few minutes. The cough is non-productive. Associated symptoms include a fever, headaches, nasal congestion, postnasal drip and a sore throat. Pertinent negatives include no chest pain, chills, ear pain, myalgias, rhinorrhea, shortness of breath or wheezing. She has tried OTC cough suppressant for the symptoms. The treatment provided mild relief. There is no history of asthma or pneumonia.       PMH:  has a past medical history of Anemia, Anxiety, Bowel habit changes, Cancer (Formerly [facility]) (2009), Cold, Dental disorder, Gynecological disorder, Heart burn, Hiatus hernia syndrome, Hypertension, Pain (2014), and Psychiatric problem. She also has no past medical history of CAD (coronary artery disease), COPD, Liver disease, or Sickle cell disease (Formerly [facility]).  MEDS:   Current Outpatient Medications:   •  phenazopyridine (PYRIDIUM) 200 MG Tab, Take 1 tablet by mouth 3 times a day as needed for Mild Pain or Moderate Pain., Disp: 10 tablet, Rfl: 0  •  VRAYLAR 1.5 MG Cap, , Disp: , Rfl:   •  azithromycin (ZITHROMAX) 250 MG Tab, Take 2 tablets by mouth on day one. Take one tablet by mouth the remaining days until gone, Disp: 6 tablet, Rfl: 0  •  albuterol 108 (90 Base) MCG/ACT Aero Soln inhalation aerosol, Inhale 2 Puffs every 6 hours as needed for Shortness of Breath., Disp: 8.5 g, Rfl: 0  •  Cariprazine HCl (VRAYLAR PO), Take  by mouth., Disp: , Rfl:   •  busPIRone (BUSPAR) 10 MG Tab tablet, buspirone 10 mg tablet  TK 1 T PO BID, Disp: , Rfl:   •  levonorgestrel (MIRENA, 52 MG,) 52 mg  "(20 mcg/24 hr) IUD, Mirena 20 mcg/24 hours (5 yrs) 52 mg intrauterine device, Disp: , Rfl:   •  lisinopril-hydrochlorothiazide (PRINZIDE) 20-12.5 MG per tablet, lisinopril 20 mg-hydrochlorothiazide 12.5 mg tablet  TK 1 T PO QD, Disp: , Rfl:   •  TURMERIC PO, Take 2 Caps by mouth every day., Disp: , Rfl:   •  omeprazole (PRILOSEC) 20 MG delayed-release capsule, Take 20 mg by mouth 2 times a day as needed., Disp: , Rfl:   ALLERGIES:   Allergies   Allergen Reactions   • Aspirin Vomiting   • Other Misc      Waterproof band aids took off skin and caused infection    • Nsaids Unspecified     Pt states not allergic to ALL nsaids, just \"not supposed to take them after gastric sleeve surgery\"     SURGHX:   Past Surgical History:   Procedure Laterality Date   • GASTRIC BYPASS LAPAROSCOPIC  3/20/2019    Procedure: GASTRIC BYPASS LAPAROSCOPIC- REVISION OF GASTRIC RESTRICTION WITH SINGLE ANASTAMOSIS DUODENAL- INTESTINAL BYPASS;  Surgeon: Keegan Hernandez M.D.;  Location: [facility];  Service: General   • HIATAL HERNIA REPAIR  3/20/2019    Procedure: HIATAL HERNIA REPAIR- POSSIBLE OPEN;  Surgeon: Keegan Hernandez M.D.;  Location: [facility];  Service: General   • HUMERUS ORIF Left 3/1/2019    Procedure: HUMERUS ORIF;  Surgeon: Bandar Block M.D.;  Location: [facility];  Service: Orthopedics   • GASTROSCOPY  5/17/2017    Procedure: GASTROSCOPY W/DILATATION, 30-MM ACHALASIA;  Surgeon: Keegan Hernandez M.D.;  Location: [facility];  Service:    • GASTRIC SLEEVE LAPAROSCOPY N/A 2/3/2016    Procedure: GASTRIC SLEEVE LAPAROSCOPY AND LIVER BIOPSY;  Surgeon: Keegan Hernandez M.D.;  Location: [facility];  Service:    • KNEE ARTHROSCOPY  12/10/2014    Performed by Mikel Spencer M.D. at [facility]   • MEDIAL MENISCECTOMY  12/10/2014    Performed by Mikel Spencer M.D. at [facility]   • MENISCECTOMY  12/10/2014    Performed by Mikel Spencer, " "M.D. at SURGERY [facility]   • LATERAL RELEASE  12/10/2014    Performed by Mikel Spencer M.D. at SURGERY [facility]   • TYMPANOPLASTY  11/6/2012    Performed by Jonn Kaufman M.D. at SURGERY SAME DAY [facility]   • GYN SURGERY      D&C-IUD   • TONSILLECTOMY     • TUBAL LIGATION       SOCHX:  reports that she quit smoking about 2 years ago. Her smoking use included cigarettes. She has a 12.00 pack-year smoking history. She has never used smokeless tobacco. She reports current alcohol use. She reports that she does not use drugs.  FH: family history includes Breast Cancer in her father.    Review of Systems   Constitutional: Positive for fever. Negative for chills and malaise/fatigue.   HENT: Positive for congestion, postnasal drip and sore throat. Negative for ear pain and rhinorrhea.    Respiratory: Positive for cough. Negative for shortness of breath and wheezing.    Cardiovascular: Negative for chest pain, palpitations and leg swelling.   Gastrointestinal: Negative for abdominal pain, diarrhea, nausea and vomiting.   Musculoskeletal: Negative for myalgias.   Neurological: Positive for headaches. Negative for dizziness.       Medications, Allergies, and current problem list reviewed today in Epic           Objective     /76   Pulse 84   Temp 36.6 °C (97.9 °F)   Resp 14   Ht 1.676 m (5' 6\")   Wt (!) 133 kg (294 lb)   SpO2 97%   BMI 47.45 kg/m²      Physical Exam  Vitals and nursing note reviewed.   Constitutional:       General: She is not in acute distress.     Appearance: Normal appearance. She is well-developed. She is not diaphoretic.   HENT:      Head: Normocephalic and atraumatic.      Right Ear: Hearing, tympanic membrane, ear canal and external ear normal. No decreased hearing noted. Tympanic membrane is not erythematous.      Left Ear: Hearing, tympanic membrane, ear canal and external ear normal. No decreased hearing noted. Tympanic membrane is not erythematous.      " Nose: Congestion and rhinorrhea present.      Right Sinus: Maxillary sinus tenderness present.      Left Sinus: Maxillary sinus tenderness present.      Mouth/Throat:      Dentition: Normal dentition.      Pharynx: Posterior oropharyngeal erythema present. No oropharyngeal exudate.   Eyes:      General: No scleral icterus.        Right eye: No discharge.         Left eye: No discharge.      Conjunctiva/sclera: Conjunctivae normal.   Cardiovascular:      Rate and Rhythm: Normal rate and regular rhythm.      Heart sounds: Normal heart sounds. No murmur heard.     Pulmonary:      Effort: Pulmonary effort is normal. No respiratory distress.      Breath sounds: Normal breath sounds. No wheezing, rhonchi or rales.   Musculoskeletal:      Cervical back: Normal range of motion and neck supple.   Lymphadenopathy:      Head:      Right side of head: Submandibular adenopathy present.      Left side of head: Submandibular adenopathy present.      Cervical: No cervical adenopathy.      Right cervical: No superficial cervical adenopathy.     Left cervical: No superficial cervical adenopathy.   Skin:     General: Skin is warm and dry.      Nails: There is no clubbing.   Neurological:      Mental Status: She is alert and oriented to person, place, and time.   Psychiatric:         Behavior: Behavior normal.         Thought Content: Thought content normal.         Judgment: Judgment normal.                             Assessment & Plan         1. Cough  SARS-CoV-2, PCR (In-House): Collect NP OR nasal swab in VTM   2. Sore throat  POCT Rapid Strep A    SARS-CoV-2, PCR (In-House): Collect NP OR nasal swab in VTM    amoxicillin-clavulanate (AUGMENTIN) 875-125 MG Tab   3. Acute non-recurrent maxillary sinusitis  amoxicillin-clavulanate (AUGMENTIN) 875-125 MG Tab     Likely maxillary sinusitis based on symptoms and history.  Covid testing initiated based on exposure.  PO2 97%.  Lungs clear bilateral without wheezes rhonchi or rales.  Take  full course of antibiotic  Tylenol and Motrin for fever and pain  OTC meds as discussed including oral decongestant if tolerated  Increase fluids and rest  Nasal spray, nasal wash, Raissa pot    Return to clinic or go to ED if symptoms worsen or persist. Indications for ED discussed at length. Patient/Parent/Guardian voices understanding. Follow-up with your primary care provider in 3-5 days. Red flag symptoms discussed. All side effects of medication discussed including allergic response, GI upset, tendon injury, rash, sedation etc.    Please note that this dictation was created using voice recognition software. I have made every reasonable attempt to correct obvious errors, but I expect that there are errors of grammar and possibly content that I did not discover before finalizing the note.

## 2021-09-08 DIAGNOSIS — R05.9 COUGH: ICD-10-CM

## 2021-09-08 DIAGNOSIS — J02.9 SORE THROAT: ICD-10-CM

## 2021-09-08 LAB
AMBIGUOUS DTTM AMBI4: NORMAL
COVID ORDER STATUS COVID19: NORMAL
SARS-COV-2 RNA RESP QL NAA+PROBE: NOTDETECTED
SPECIMEN SOURCE: NORMAL

## 2021-10-20 ENCOUNTER — HOSPITAL ENCOUNTER (OUTPATIENT)
Dept: LAB | Facility: MEDICAL CENTER | Age: 51
End: 2021-10-20
Attending: PHYSICIAN ASSISTANT
Payer: MEDICAID

## 2021-10-20 LAB
25(OH)D3 SERPL-MCNC: 26 NG/ML (ref 30–100)
ALBUMIN SERPL BCP-MCNC: 4.1 G/DL (ref 3.2–4.9)
ALBUMIN/GLOB SERPL: 1.5 G/DL
ALP SERPL-CCNC: 98 U/L (ref 30–99)
ALT SERPL-CCNC: 13 U/L (ref 2–50)
ANION GAP SERPL CALC-SCNC: 11 MMOL/L (ref 7–16)
AST SERPL-CCNC: 12 U/L (ref 12–45)
BASOPHILS # BLD AUTO: 0.4 % (ref 0–1.8)
BASOPHILS # BLD: 0.02 K/UL (ref 0–0.12)
BILIRUB SERPL-MCNC: 0.4 MG/DL (ref 0.1–1.5)
BUN SERPL-MCNC: 7 MG/DL (ref 8–22)
CALCIUM SERPL-MCNC: 8.9 MG/DL (ref 8.5–10.5)
CHLORIDE SERPL-SCNC: 106 MMOL/L (ref 96–112)
CHOLEST SERPL-MCNC: 133 MG/DL (ref 100–199)
CO2 SERPL-SCNC: 22 MMOL/L (ref 20–33)
CREAT SERPL-MCNC: 0.63 MG/DL (ref 0.5–1.4)
EOSINOPHIL # BLD AUTO: 0.04 K/UL (ref 0–0.51)
EOSINOPHIL NFR BLD: 0.8 % (ref 0–6.9)
ERYTHROCYTE [DISTWIDTH] IN BLOOD BY AUTOMATED COUNT: 41.6 FL (ref 35.9–50)
FERRITIN SERPL-MCNC: 13.5 NG/ML (ref 10–291)
FOLATE SERPL-MCNC: 4.9 NG/ML
GLOBULIN SER CALC-MCNC: 2.8 G/DL (ref 1.9–3.5)
GLUCOSE SERPL-MCNC: 96 MG/DL (ref 65–99)
HCT VFR BLD AUTO: 35.9 % (ref 37–47)
HDLC SERPL-MCNC: 61 MG/DL
HGB BLD-MCNC: 12.2 G/DL (ref 12–16)
IMM GRANULOCYTES # BLD AUTO: 0.01 K/UL (ref 0–0.11)
IMM GRANULOCYTES NFR BLD AUTO: 0.2 % (ref 0–0.9)
IRON SERPL-MCNC: 75 UG/DL (ref 40–170)
LDLC SERPL CALC-MCNC: 62 MG/DL
LYMPHOCYTES # BLD AUTO: 2.09 K/UL (ref 1–4.8)
LYMPHOCYTES NFR BLD: 41.6 % (ref 22–41)
MCH RBC QN AUTO: 30.3 PG (ref 27–33)
MCHC RBC AUTO-ENTMCNC: 34 G/DL (ref 33.6–35)
MCV RBC AUTO: 89.1 FL (ref 81.4–97.8)
MONOCYTES # BLD AUTO: 0.32 K/UL (ref 0–0.85)
MONOCYTES NFR BLD AUTO: 6.4 % (ref 0–13.4)
NEUTROPHILS # BLD AUTO: 2.54 K/UL (ref 2–7.15)
NEUTROPHILS NFR BLD: 50.6 % (ref 44–72)
NRBC # BLD AUTO: 0 K/UL
NRBC BLD-RTO: 0 /100 WBC
PLATELET # BLD AUTO: 250 K/UL (ref 164–446)
PMV BLD AUTO: 11.6 FL (ref 9–12.9)
POTASSIUM SERPL-SCNC: 3.7 MMOL/L (ref 3.6–5.5)
PREALB SERPL-MCNC: 18.7 MG/DL (ref 18–38)
PROT SERPL-MCNC: 6.9 G/DL (ref 6–8.2)
RBC # BLD AUTO: 4.03 M/UL (ref 4.2–5.4)
SODIUM SERPL-SCNC: 139 MMOL/L (ref 135–145)
TRANSFERRIN SERPL-MCNC: 291 MG/DL (ref 200–370)
TRIGL SERPL-MCNC: 49 MG/DL (ref 0–149)
VIT B12 SERPL-MCNC: 772 PG/ML (ref 211–911)
WBC # BLD AUTO: 5 K/UL (ref 4.8–10.8)

## 2021-10-20 PROCEDURE — 80061 LIPID PANEL: CPT

## 2021-10-20 PROCEDURE — 83540 ASSAY OF IRON: CPT

## 2021-10-20 PROCEDURE — 84207 ASSAY OF VITAMIN B-6: CPT

## 2021-10-20 PROCEDURE — 82306 VITAMIN D 25 HYDROXY: CPT

## 2021-10-20 PROCEDURE — 82607 VITAMIN B-12: CPT

## 2021-10-20 PROCEDURE — 82728 ASSAY OF FERRITIN: CPT

## 2021-10-20 PROCEDURE — 84466 ASSAY OF TRANSFERRIN: CPT

## 2021-10-20 PROCEDURE — 84425 ASSAY OF VITAMIN B-1: CPT

## 2021-10-20 PROCEDURE — 85025 COMPLETE CBC W/AUTO DIFF WBC: CPT

## 2021-10-20 PROCEDURE — 84630 ASSAY OF ZINC: CPT

## 2021-10-20 PROCEDURE — 84252 ASSAY OF VITAMIN B-2: CPT

## 2021-10-20 PROCEDURE — 82746 ASSAY OF FOLIC ACID SERUM: CPT

## 2021-10-20 PROCEDURE — 80053 COMPREHEN METABOLIC PANEL: CPT

## 2021-10-20 PROCEDURE — 36415 COLL VENOUS BLD VENIPUNCTURE: CPT

## 2021-10-20 PROCEDURE — 84134 ASSAY OF PREALBUMIN: CPT

## 2021-10-23 LAB — ZINC BLD-MCNC: 522.1 UG/DL (ref 440–860)

## 2021-10-25 LAB — VIT B1 BLD-MCNC: 101 NMOL/L (ref 70–180)

## 2021-10-26 LAB — VIT B2 SERPL-SCNC: 12 NMOL/L (ref 5–50)

## 2021-10-27 LAB — VIT B6 SERPL-MCNC: 54.4 NMOL/L (ref 20–125)

## 2021-12-08 ENCOUNTER — OFFICE VISIT (OUTPATIENT)
Dept: URGENT CARE | Facility: PHYSICIAN GROUP | Age: 51
End: 2021-12-08
Payer: MEDICAID

## 2021-12-08 VITALS
WEIGHT: 293 LBS | RESPIRATION RATE: 16 BRPM | TEMPERATURE: 98 F | SYSTOLIC BLOOD PRESSURE: 136 MMHG | HEART RATE: 72 BPM | HEIGHT: 68 IN | BODY MASS INDEX: 44.41 KG/M2 | OXYGEN SATURATION: 98 % | DIASTOLIC BLOOD PRESSURE: 82 MMHG

## 2021-12-08 DIAGNOSIS — J32.9 CHRONIC RECURRENT SINUSITIS: ICD-10-CM

## 2021-12-08 DIAGNOSIS — J45.909 REACTIVE AIRWAY DISEASE WITHOUT COMPLICATION, UNSPECIFIED ASTHMA SEVERITY, UNSPECIFIED WHETHER PERSISTENT: ICD-10-CM

## 2021-12-08 PROCEDURE — 99214 OFFICE O/P EST MOD 30 MIN: CPT | Performed by: FAMILY MEDICINE

## 2021-12-08 RX ORDER — PANTOPRAZOLE SODIUM 20 MG/1
TABLET, DELAYED RELEASE ORAL
COMMUNITY
Start: 2021-11-04 | End: 2021-12-08

## 2021-12-08 RX ORDER — OMEPRAZOLE 20 MG/1
1 CAPSULE, DELAYED RELEASE ORAL
COMMUNITY
Start: 2021-11-01 | End: 2021-12-08

## 2021-12-08 RX ORDER — ALLOPURINOL 100 MG/1
TABLET ORAL
COMMUNITY
End: 2022-03-06

## 2021-12-08 RX ORDER — SPIRONOLACTONE 25 MG/1
25 TABLET ORAL DAILY
COMMUNITY
Start: 2021-10-08 | End: 2021-12-08

## 2021-12-08 RX ORDER — ALBUTEROL SULFATE 90 UG/1
AEROSOL, METERED RESPIRATORY (INHALATION)
Qty: 8.5 G | Refills: 2 | Status: SHIPPED | OUTPATIENT
Start: 2021-12-08 | End: 2022-06-14

## 2021-12-08 RX ORDER — TOPIRAMATE 50 MG/1
TABLET, FILM COATED ORAL
COMMUNITY
Start: 2021-10-07 | End: 2022-03-06

## 2021-12-08 RX ORDER — LISINOPRIL 20 MG/1
1 TABLET ORAL DAILY
COMMUNITY
End: 2023-06-14

## 2021-12-08 RX ORDER — PRAZOSIN HYDROCHLORIDE 1 MG/1
2 CAPSULE ORAL
COMMUNITY
End: 2022-03-06

## 2021-12-08 RX ORDER — QUETIAPINE FUMARATE 25 MG/1
TABLET, FILM COATED ORAL
COMMUNITY
Start: 2020-11-16 | End: 2021-12-08

## 2021-12-08 RX ORDER — SPIRONOLACTONE 25 MG/1
TABLET ORAL
COMMUNITY
Start: 2021-10-06 | End: 2023-03-06

## 2021-12-08 RX ORDER — CITALOPRAM 20 MG/1
TABLET ORAL
COMMUNITY
End: 2021-12-08

## 2021-12-08 RX ORDER — BUSPIRONE HYDROCHLORIDE 10 MG/1
10 TABLET ORAL
COMMUNITY

## 2021-12-08 RX ORDER — DOXYCYCLINE HYCLATE 100 MG
100 TABLET ORAL 2 TIMES DAILY
Qty: 20 TABLET | Refills: 0 | Status: SHIPPED | OUTPATIENT
Start: 2021-12-08 | End: 2021-12-18

## 2021-12-08 RX ORDER — PRAZOSIN HYDROCHLORIDE 1 MG/1
CAPSULE ORAL
COMMUNITY
Start: 2021-10-07 | End: 2021-12-08

## 2021-12-08 RX ORDER — OMEPRAZOLE 40 MG/1
40 CAPSULE, DELAYED RELEASE ORAL
COMMUNITY
Start: 2021-11-15 | End: 2022-06-13

## 2021-12-08 RX ORDER — ACYCLOVIR 400 MG/1
400 TABLET ORAL 2 TIMES DAILY
COMMUNITY
End: 2022-03-06

## 2021-12-08 RX ORDER — PROGESTERONE 200 MG/1
200 CAPSULE ORAL
COMMUNITY
Start: 2021-08-11 | End: 2022-09-05

## 2021-12-08 ASSESSMENT — FIBROSIS 4 INDEX: FIB4 SCORE: 0.65
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
## 2022-03-06 ENCOUNTER — OFFICE VISIT (OUTPATIENT)
Dept: URGENT CARE | Facility: PHYSICIAN GROUP | Age: 52
End: 2022-03-06
Payer: MEDICAID

## 2022-03-06 VITALS
OXYGEN SATURATION: 95 % | BODY MASS INDEX: 45.99 KG/M2 | TEMPERATURE: 97.6 F | SYSTOLIC BLOOD PRESSURE: 146 MMHG | DIASTOLIC BLOOD PRESSURE: 74 MMHG | WEIGHT: 293 LBS | HEART RATE: 82 BPM | RESPIRATION RATE: 16 BRPM | HEIGHT: 67 IN

## 2022-03-06 DIAGNOSIS — Z11.52 ENCOUNTER FOR SCREENING FOR COVID-19: ICD-10-CM

## 2022-03-06 DIAGNOSIS — J32.9 CHRONIC RECURRENT SINUSITIS: ICD-10-CM

## 2022-03-06 LAB
EXTERNAL QUALITY CONTROL: NORMAL
SARS-COV+SARS-COV-2 AG RESP QL IA.RAPID: NEGATIVE

## 2022-03-06 PROCEDURE — 99213 OFFICE O/P EST LOW 20 MIN: CPT | Mod: CS | Performed by: FAMILY MEDICINE

## 2022-03-06 PROCEDURE — 87426 SARSCOV CORONAVIRUS AG IA: CPT | Performed by: FAMILY MEDICINE

## 2022-03-06 RX ORDER — CARIPRAZINE 3 MG/1
CAPSULE, GELATIN COATED ORAL
COMMUNITY
Start: 2022-03-05 | End: 2023-03-06

## 2022-03-06 RX ORDER — CARIPRAZINE 1.5 MG-3MG
KIT ORAL
COMMUNITY
End: 2022-03-06

## 2022-03-06 RX ORDER — LISINOPRIL AND HYDROCHLOROTHIAZIDE 12.5; 1 MG/1; MG/1
TABLET ORAL
COMMUNITY
End: 2023-03-06

## 2022-03-06 RX ORDER — DOXYCYCLINE HYCLATE 100 MG
100 TABLET ORAL 2 TIMES DAILY
Qty: 20 TABLET | Refills: 0 | Status: SHIPPED | OUTPATIENT
Start: 2022-03-06 | End: 2022-03-16

## 2022-03-06 RX ORDER — ACYCLOVIR 50 MG/G
CREAM TOPICAL
COMMUNITY
Start: 2021-12-10 | End: 2022-03-06

## 2022-03-06 RX ORDER — PRAZOSIN HYDROCHLORIDE 5 MG/1
CAPSULE ORAL
COMMUNITY
Start: 2022-02-27

## 2022-03-06 RX ORDER — ACYCLOVIR 800 MG/1
TABLET ORAL
COMMUNITY
Start: 2021-12-08 | End: 2022-03-06

## 2022-03-06 RX ORDER — QUETIAPINE FUMARATE 100 MG/1
TABLET, FILM COATED ORAL
COMMUNITY
Start: 2020-10-10 | End: 2022-06-14

## 2022-03-06 RX ORDER — PRAZOSIN HYDROCHLORIDE 5 MG/1
CAPSULE ORAL
COMMUNITY
End: 2022-03-06

## 2022-03-06 RX ORDER — ACYCLOVIR 800 MG/1
TABLET ORAL
COMMUNITY
Start: 2021-12-08

## 2022-03-06 RX ORDER — TOPIRAMATE 50 MG/1
TABLET, FILM COATED ORAL
COMMUNITY
End: 2023-06-14

## 2022-03-06 RX ORDER — PRAZOSIN HYDROCHLORIDE 2 MG/1
CAPSULE ORAL
COMMUNITY
Start: 2022-02-02 | End: 2022-03-06

## 2022-03-06 RX ORDER — CARIPRAZINE 1.5 MG/1
1 CAPSULE, GELATIN COATED ORAL
COMMUNITY
Start: 2021-12-11 | End: 2022-03-06

## 2022-03-06 RX ORDER — LISINOPRIL AND HYDROCHLOROTHIAZIDE 20; 12.5 MG/1; MG/1
TABLET ORAL
COMMUNITY
Start: 2021-11-19 | End: 2022-03-06

## 2022-03-06 RX ORDER — NALTREXONE HYDROCHLORIDE 50 MG/1
TABLET, FILM COATED ORAL
COMMUNITY
Start: 2022-01-31 | End: 2023-09-22

## 2022-03-06 RX ORDER — ASCORBIC ACID 100 MG
TABLET,CHEWABLE ORAL
COMMUNITY
End: 2022-06-14

## 2022-03-06 RX ORDER — COVID-19 MOLECULAR TEST ASSAY
KIT MISCELLANEOUS
COMMUNITY
Start: 2022-01-27 | End: 2022-03-06

## 2022-03-06 ASSESSMENT — FIBROSIS 4 INDEX: FIB4 SCORE: 0.65

## 2022-03-06 NOTE — PROGRESS NOTES
Chief Complaint:    Chief Complaint   Patient presents with   • Coronavirus Screening     Cough, congestion, headache, bodyaches, x2days        History of Present Illness:    Symptoms x 2 days. Has had fever, right ear discomfort, discomfort in all sinus regions, and nasal symptoms with purulent mucus from nose. No sore throat. Asthma is stable. She has history of frequent recurrent sinus infections. Doxycycline worked and was tolerated for sinus infection treatment 12/8/21. Did not see ENT since last visit 12/8/21.      Past Medical History:    Past Medical History:   Diagnosis Date   • Anemia     during pregnancy   • Anxiety    • Bowel habit changes     constipation   • Cancer (HCC) 2009    uterine   • Cold    • Dental disorder     lower partial   • Gynecological disorder     menorrhagia   • Heart burn    • Hiatus hernia syndrome    • Hypertension    • Pain 2014    left knee   • Psychiatric problem     anxiety/depression     Past Surgical History:    Past Surgical History:   Procedure Laterality Date   • GASTRIC BYPASS LAPAROSCOPIC  3/20/2019    Procedure: GASTRIC BYPASS LAPAROSCOPIC- REVISION OF GASTRIC RESTRICTION WITH SINGLE ANASTAMOSIS DUODENAL- INTESTINAL BYPASS;  Surgeon: Keegan Hernandez M.D.;  Location: [facility];  Service: General   • HIATAL HERNIA REPAIR  3/20/2019    Procedure: HIATAL HERNIA REPAIR- POSSIBLE OPEN;  Surgeon: Keegan Hernandez M.D.;  Location: [facility];  Service: General   • ORIF, FRACTURE, HUMERUS Left 3/1/2019    Procedure: HUMERUS ORIF;  Surgeon: Bandar Block M.D.;  Location: [facility];  Service: Orthopedics   • GASTROSCOPY  5/17/2017    Procedure: GASTROSCOPY W/DILATATION, 30-MM ACHALASIA;  Surgeon: Keegan Hernandez M.D.;  Location: [facility];  Service:    • GASTRIC SLEEVE LAPAROSCOPY N/A 2/3/2016    Procedure: GASTRIC SLEEVE LAPAROSCOPY AND LIVER BIOPSY;  Surgeon: Keegan Hernandez M.D.;  [place] [facility]  Service:    •  KNEE ARTHROSCOPY  12/10/2014    Performed by Mikel Spencer M.D. at SURGERY [facility]   • MENISCECTOMY, KNEE, MEDIAL  12/10/2014    Performed by Mikel Spencer M.D. at SURGERY [facility]   • MENISCECTOMY  12/10/2014    Performed by Mikel Spencer M.D. at SURGERY [facility]   • LATERAL RELEASE  12/10/2014    Performed by Mikel Spencer M.D. at SURGERY [facility]   • TYMPANOPLASTY  11/6/2012    Performed by Jonn Kaufman M.D. at SURGERY SAME DAY [facility]   • GYN SURGERY      D&C-IUD   • TONSILLECTOMY     • TUBAL LIGATION       Social History:    Social History     Socioeconomic History   • Marital status:      Spouse name: Not on file   • Number of children: Not on file   • Years of education: Not on file   • Highest education level: Not on file   Occupational History   • Not on file   Tobacco Use   • Smoking status: Former Smoker     Packs/day: 1.00     Years: 12.00     Pack years: 12.00     Types: Cigarettes     Quit date: 2/11/2019     Years since quitting: 3.0   • Smokeless tobacco: Never Used   • Tobacco comment: 2 yrs  1/2 ppd   Vaping Use   • Vaping Use: Never used   Substance and Sexual Activity   • Alcohol use: Yes     Comment: 1 pint of whiskey nightly    • Drug use: No     Comment: meth  none since 2001   • Sexual activity: Yes     Partners: Male   Other Topics Concern   • Not on file   Social History Narrative   • Not on file     Social Determinants of Health     Financial Resource Strain: Not on file   Food Insecurity: Not on file   Transportation Needs: Not on file   Physical Activity: Not on file   Stress: Not on file   Social Connections: Not on file   Intimate Partner Violence: Not on file   Housing Stability: Not on file     Family History:    Family History   Problem Relation Age of Onset   • Breast Cancer Father      Medications:    Current Outpatient Medications on File Prior to Visit   Medication Sig Dispense Refill   • prazosin  "(MINIPRESS) 5 MG Cap      • naltrexone (DEPADE) 50 MG Tab TAKE 1 TABLET BY MOUTH EVERY NIGHT AT BEDTIME FOR CRAVINGS     • acyclovir (ZOVIRAX) 800 MG Tab TAKE 1 TABLET BY MOUTH TWICE DAILY FOR 5 DAYS     • VRAYLAR 3 MG Cap      • lisinopril-hydrochlorothiazide (PRINZIDE) 10-12.5 MG per tablet      • QUEtiapine (SEROQUEL) 100 MG Tab Take  by mouth.     • topiramate (TOPAMAX) 50 MG tablet      • B Complex Vitamins (VITAMIN-B COMPLEX PO)      • Ascorbic Acid (VITAMIN C) 100 MG Chew Tab      • progesterone (PROMETRIUM) 200 MG capsule Take 200 mg by mouth.     • spironolactone (ALDACTONE) 25 MG Tab Take  by mouth.     • busPIRone (BUSPAR) 10 MG Tab tablet Take 10 mg by mouth.     • omeprazole (PRILOSEC) 40 MG delayed-release capsule Take 40 mg by mouth.     • lisinopril (PRINIVIL) 20 MG Tab Take 1 Tablet by mouth every day.     • albuterol 108 (90 Base) MCG/ACT Aero Soln inhalation aerosol 2 PUFFS EVERY 4 HOURS ONLY IF NEEDED FOR COUGH, WHEEZING, OR SHORTNESS OF BREATH. 8.5 g 2     No current facility-administered medications on file prior to visit.     Allergies:    Allergies   Allergen Reactions   • Aspirin Vomiting   • Aspirin Vomiting   • Other Misc      Waterproof band aids took off skin and caused infection    • Nsaids Unspecified     Pt states not allergic to ALL nsaids, just \"not supposed to take them after gastric sleeve surgery\"       Vitals:    Vitals:    03/06/22 1334   BP: 146/74   Pulse: 82   Resp: 16   Temp: 36.4 °C (97.6 °F)   TempSrc: Temporal   SpO2: 95%   Weight: (!) 137 kg (303 lb)   Height: 1.702 m (5' 7\")       Physical Exam:    Constitutional: Vital signs reviewed. Appears well-developed and well-nourished. No acute distress.   Eyes: Sclera white, conjunctivae clear.   ENT: TTP all sinus regions bilaterally. External ears normal. External auditory canals normal without discharge. Right TM translucent and non-bulging with blue PE tube. Left TM: clear, but also has possible perforation (same as OV " 21). Hearing normal. Lips/teeth are normal. Oral mucosa pink and moist. Posterior pharynx: WNL.  Neck: Neck supple.   Cardiovascular: Regular rate and rhythm. No murmur.  Pulmonary/Chest: Respirations non-labored. Clear to auscultation bilaterally.  Musculoskeletal: Normal gait. No muscular atrophy or weakness.  Neurological: Alert and oriented to person, place, and time. Muscle tone normal. Coordination normal.   Skin: No rashes or lesions. Warm, dry, normal turgor.  Psychiatric: Normal mood and affect. Behavior is normal. Judgment and thought content normal.       Diagnostics:    POCT SARS-COV Antigen LOLA (Symptomatic only)  Order: [id number]   Status: Final result     Visible to patient: No (scheduled for 3/7/2022 12:13 PM)     Next appt: None     Dx: Encounter for screening for COVID-19     0 Result Notes     Ref Range & Units  1:53 PM   Internal   Valid    SARS-COV ANTIGEN LOLA Negative, Indeterminate, None Detected, Valid, Invalid, Pass Negative    Resulting Agency  [facility]              Specimen Collected: 22  1:53 PM Last Resulted: 22  2:13 PM             Medical Decision Makin. Chronic recurrent sinusitis  - doxycycline (VIBRAMYCIN) 100 MG Tab; Take 1 Tablet by mouth 2 times a day for 10 days.  Dispense: 20 Tablet; Refill: 0    2. Encounter for screening for COVID-19  - POCT SARS-COV Antigen LOLA (Symptomatic only)      Discussed with her DDX, management options, and risks, benefits, and alternatives to treatment plan agreed upon.    Symptoms x 2 days. Has had fever, right ear discomfort, discomfort in all sinus regions, and nasal symptoms with purulent mucus from nose. No sore throat. Asthma is stable. She has history of frequent recurrent sinus infections. Doxycycline worked and was tolerated for sinus infection treatment 21. Did not see ENT since last visit 21.    TTP all sinus regions bilaterally. Right TM translucent and non-bulging with blue PE tube.  Left TM: clear, but also has possible perforation (same as OV 12/8/21).    POC Covid test is negative.    Chronic sinusitis with acute exacerbation.    Agreeable to medication prescribed.    Discussed expected course of duration, time for improvement, and to seek follow-up in Emergency Room, urgent care, or with PCP if getting worse at any time or not improving within expected time frame.

## 2022-06-14 ENCOUNTER — OFFICE VISIT (OUTPATIENT)
Dept: URGENT CARE | Facility: PHYSICIAN GROUP | Age: 52
End: 2022-06-14
Payer: MEDICAID

## 2022-06-14 VITALS
HEART RATE: 86 BPM | SYSTOLIC BLOOD PRESSURE: 138 MMHG | RESPIRATION RATE: 16 BRPM | BODY MASS INDEX: 45.99 KG/M2 | TEMPERATURE: 97.8 F | WEIGHT: 293 LBS | HEIGHT: 67 IN | OXYGEN SATURATION: 97 % | DIASTOLIC BLOOD PRESSURE: 82 MMHG

## 2022-06-14 DIAGNOSIS — Z87.440 HISTORY OF UTI: ICD-10-CM

## 2022-06-14 DIAGNOSIS — M54.50 ACUTE RIGHT-SIDED LOW BACK PAIN WITHOUT SCIATICA: ICD-10-CM

## 2022-06-14 LAB
APPEARANCE UR: CLEAR
BILIRUB UR STRIP-MCNC: NORMAL MG/DL
COLOR UR AUTO: YELLOW
GLUCOSE UR STRIP.AUTO-MCNC: NORMAL MG/DL
KETONES UR STRIP.AUTO-MCNC: NORMAL MG/DL
LEUKOCYTE ESTERASE UR QL STRIP.AUTO: NORMAL
NITRITE UR QL STRIP.AUTO: NORMAL
PH UR STRIP.AUTO: 6 [PH] (ref 5–8)
PROT UR QL STRIP: NORMAL MG/DL
RBC UR QL AUTO: NORMAL
SP GR UR STRIP.AUTO: <=1.005
UROBILINOGEN UR STRIP-MCNC: 0.2 MG/DL

## 2022-06-14 PROCEDURE — 99213 OFFICE O/P EST LOW 20 MIN: CPT | Mod: 25 | Performed by: NURSE PRACTITIONER

## 2022-06-14 PROCEDURE — 81002 URINALYSIS NONAUTO W/O SCOPE: CPT | Performed by: NURSE PRACTITIONER

## 2022-06-14 ASSESSMENT — FIBROSIS 4 INDEX: FIB4 SCORE: 0.65

## 2022-06-14 ASSESSMENT — ENCOUNTER SYMPTOMS
ABDOMINAL PAIN: 0
CHILLS: 0
FEVER: 0
BACK PAIN: 1
CONSTITUTIONAL NEGATIVE: 1
NAUSEA: 0
VOMITING: 0

## 2022-06-14 ASSESSMENT — VISUAL ACUITY: OU: 1

## 2022-06-14 ASSESSMENT — PAIN SCALES - GENERAL: PAINLEVEL: 8=MODERATE-SEVERE PAIN

## 2022-06-14 NOTE — PROGRESS NOTES
Subjective:     Nneka Ruiz is a 51 y.o. female who presents for Back Pain (LRQ of back, pt states possible kidney infection. X1 day )       Back Pain  Pertinent negatives include no abdominal pain, dysuria or fever.     New onset back pain. No injury. History of UTI. Concerned of kidney infection.    Patient was screened prior to rooming and denied COVID-19 diagnosis or contact with a person who has been diagnosed or is suspected to have COVID-19. During this visit, appropriate PPE was worn, hand hygiene was performed, and the patient and any visitors were masked.     PMH:  has a past medical history of Anemia, Anxiety, Bowel habit changes, Cancer (HCC) (2009), Cold, Dental disorder, Gynecological disorder, Heart burn, Hiatus hernia syndrome, Hypertension, Pain (2014), and Psychiatric problem.    She has no past medical history of CAD (coronary artery disease), COPD, Liver disease, or Sickle cell disease ([facility]).    MEDS:   Current Outpatient Medications:   •  prazosin (MINIPRESS) 5 MG Cap, , Disp: , Rfl:   •  naltrexone (DEPADE) 50 MG Tab, TAKE 1 TABLET BY MOUTH EVERY NIGHT AT BEDTIME FOR CRAVINGS, Disp: , Rfl:   •  acyclovir (ZOVIRAX) 800 MG Tab, TAKE 1 TABLET BY MOUTH TWICE DAILY FOR 5 DAYS, Disp: , Rfl:   •  VRAYLAR 3 MG Cap, , Disp: , Rfl:   •  lisinopril-hydrochlorothiazide (PRINZIDE) 10-12.5 MG per tablet, , Disp: , Rfl:   •  topiramate (TOPAMAX) 50 MG tablet, , Disp: , Rfl:   •  B Complex Vitamins (VITAMIN-B COMPLEX PO), , Disp: , Rfl:   •  progesterone (PROMETRIUM) 200 MG capsule, Take 200 mg by mouth., Disp: , Rfl:   •  spironolactone (ALDACTONE) 25 MG Tab, Take  by mouth., Disp: , Rfl:   •  busPIRone (BUSPAR) 10 MG Tab tablet, Take 10 mg by mouth., Disp: , Rfl:   •  lisinopril (PRINIVIL) 20 MG Tab, Take 1 Tablet by mouth every day., Disp: , Rfl:     ALLERGIES:   Allergies   Allergen Reactions   • Aspirin Vomiting   • Aspirin Vomiting   • Other Misc      Waterproof band aids took off skin and  "caused infection    • Nsaids Unspecified     Pt states not allergic to ALL nsaids, just \"not supposed to take them after gastric sleeve surgery\"     SURGHX:   Past Surgical History:   Procedure Laterality Date   • GASTRIC BYPASS LAPAROSCOPIC  3/20/2019    Procedure: GASTRIC BYPASS LAPAROSCOPIC- REVISION OF GASTRIC RESTRICTION WITH SINGLE ANASTAMOSIS DUODENAL- INTESTINAL BYPASS;  Surgeon: Keegan Hernandez M.D.;  Location: SURGERY [facility];  Service: General   • HIATAL HERNIA REPAIR  3/20/2019    Procedure: HIATAL HERNIA REPAIR- POSSIBLE OPEN;  Surgeon: Keegan Hernandez M.D.;  Location: SURGERY [facility];  Service: General   • ORIF, FRACTURE, HUMERUS Left 3/1/2019    Procedure: HUMERUS ORIF;  Surgeon: Bandar Block M.D.;  Location: SURGERY [facility];  Service: Orthopedics   • GASTROSCOPY  5/17/2017    Procedure: GASTROSCOPY W/DILATATION, 30-MM ACHALASIA;  Surgeon: Keegan Hernandez M.D.;  Location: SURGERY [facility];  Service:    • GASTRIC SLEEVE LAPAROSCOPY N/A 2/3/2016    Procedure: GASTRIC SLEEVE LAPAROSCOPY AND LIVER BIOPSY;  Surgeon: Keegan Hernandez M.D.;  Location: SURGERY [facility];  Service:    • KNEE ARTHROSCOPY  12/10/2014    Performed by Mikel Spencer M.D. at [facility]   • MENISCECTOMY, KNEE, MEDIAL  12/10/2014    Performed by Mikel Spencer M.D. at [facility]   • MENISCECTOMY  12/10/2014    Performed by Mikel Spencer M.D. at [facility]   • LATERAL RELEASE  12/10/2014    Performed by Mikel Spencer M.D. at [facility]   • TYMPANOPLASTY  11/6/2012    Performed by Jonn Kaufman M.D. at SURGERY SAME DAY [facility]   • GYN SURGERY      D&C-IUD   • TONSILLECTOMY     • TUBAL LIGATION       SOCHX:  reports that she quit smoking about 3 years ago. Her smoking use included cigarettes. She has a 12.00 pack-year smoking history. She has never used smokeless tobacco. She reports current alcohol use. She reports that " "she does not use drugs.     FH: Reviewed with patient, not pertinent to this visit.    Review of Systems   Constitutional: Negative.  Negative for chills, fever and malaise/fatigue.   Gastrointestinal: Negative for abdominal pain, nausea and vomiting.   Genitourinary: Positive for urgency (Minimal). Negative for dysuria and frequency.   Musculoskeletal: Positive for back pain.   All other systems reviewed and are negative.    Additional details per HPI.      Objective:     /82   Pulse 86   Temp 36.6 °C (97.8 °F) (Temporal)   Resp 16   Ht 1.702 m (5' 7\")   Wt (!) 140 kg (309 lb)   SpO2 97%   BMI 48.40 kg/m²     Physical Exam  Vitals reviewed.   Constitutional:       General: She is not in acute distress.     Appearance: She is well-developed. She is not ill-appearing or toxic-appearing.   Eyes:      General: Vision grossly intact.   Cardiovascular:      Rate and Rhythm: Normal rate.   Pulmonary:      Effort: Pulmonary effort is normal. No respiratory distress.   Musculoskeletal:         General: No deformity. Normal range of motion.      Cervical back: Normal range of motion.      Lumbar back: Tenderness (R paraspinous musculature) present. No swelling or deformity. Normal range of motion.   Skin:     Coloration: Skin is not pale.   Neurological:      Mental Status: She is alert and oriented to person, place, and time.      Sensory: No sensory deficit.      Motor: No weakness.   Psychiatric:         Behavior: Behavior normal. Behavior is cooperative.     UA: SG  > 1.005, otherwise unremarkable      Assessment/Plan:     1. Acute right-sided low back pain without sciatica  - POCT Urinalysis  - URINE CULTURE(NEW); Future    2. History of UTI  - URINE CULTURE(NEW); Future    Back pain reproducible; muscular in nature.    May use gentle massage, stretching, and range of motion exercises as tolerated. May apply heat up to 20 minutes at a time. May use over-the-counter acetaminophen alternating with ibuprofen, " per 's instructions, for additional pain relief.     UA unremarkable. Report of mild urgency. Send for culture.    Differential diagnosis, natural history, supportive care, over-the-counter symptom management per 's instructions, close monitoring, and indications for immediate follow-up discussed.     All questions answered. Patient agrees with the plan of care.    Discharge summary provided via [facility].

## 2022-07-13 ENCOUNTER — HOSPITAL ENCOUNTER (OUTPATIENT)
Dept: LAB | Facility: MEDICAL CENTER | Age: 52
End: 2022-07-13
Attending: STUDENT IN AN ORGANIZED HEALTH CARE EDUCATION/TRAINING PROGRAM
Payer: MEDICAID

## 2022-07-13 LAB
ALBUMIN SERPL BCP-MCNC: 3.5 G/DL (ref 3.2–4.9)
ALBUMIN/GLOB SERPL: 1.2 G/DL
ALP SERPL-CCNC: 88 U/L (ref 30–99)
ALT SERPL-CCNC: 8 U/L (ref 2–50)
ANION GAP SERPL CALC-SCNC: 9 MMOL/L (ref 7–16)
AST SERPL-CCNC: 8 U/L (ref 12–45)
BASOPHILS # BLD AUTO: 0.5 % (ref 0–1.8)
BASOPHILS # BLD: 0.03 K/UL (ref 0–0.12)
BILIRUB SERPL-MCNC: 0.3 MG/DL (ref 0.1–1.5)
BUN SERPL-MCNC: 7 MG/DL (ref 8–22)
CALCIUM SERPL-MCNC: 8.4 MG/DL (ref 8.5–10.5)
CHLORIDE SERPL-SCNC: 110 MMOL/L (ref 96–112)
CHOLEST SERPL-MCNC: 123 MG/DL (ref 100–199)
CO2 SERPL-SCNC: 22 MMOL/L (ref 20–33)
CREAT SERPL-MCNC: 0.66 MG/DL (ref 0.5–1.4)
EOSINOPHIL # BLD AUTO: 0.05 K/UL (ref 0–0.51)
EOSINOPHIL NFR BLD: 0.9 % (ref 0–6.9)
ERYTHROCYTE [DISTWIDTH] IN BLOOD BY AUTOMATED COUNT: 43.7 FL (ref 35.9–50)
FASTING STATUS PATIENT QL REPORTED: NORMAL
FERRITIN SERPL-MCNC: 7.7 NG/ML (ref 10–291)
FOLATE SERPL-MCNC: 2.7 NG/ML
GFR SERPLBLD CREATININE-BSD FMLA CKD-EPI: 107 ML/MIN/1.73 M 2
GLOBULIN SER CALC-MCNC: 2.9 G/DL (ref 1.9–3.5)
GLUCOSE SERPL-MCNC: 84 MG/DL (ref 65–99)
HCT VFR BLD AUTO: 35.5 % (ref 37–47)
HDLC SERPL-MCNC: 58 MG/DL
HGB BLD-MCNC: 11.8 G/DL (ref 12–16)
IMM GRANULOCYTES # BLD AUTO: 0.01 K/UL (ref 0–0.11)
IMM GRANULOCYTES NFR BLD AUTO: 0.2 % (ref 0–0.9)
IRON SERPL-MCNC: 34 UG/DL (ref 40–170)
LDLC SERPL CALC-MCNC: 53 MG/DL
LYMPHOCYTES # BLD AUTO: 1.88 K/UL (ref 1–4.8)
LYMPHOCYTES NFR BLD: 32.2 % (ref 22–41)
MCH RBC QN AUTO: 29.5 PG (ref 27–33)
MCHC RBC AUTO-ENTMCNC: 33.2 G/DL (ref 33.6–35)
MCV RBC AUTO: 88.8 FL (ref 81.4–97.8)
MONOCYTES # BLD AUTO: 0.4 K/UL (ref 0–0.85)
MONOCYTES NFR BLD AUTO: 6.9 % (ref 0–13.4)
NEUTROPHILS # BLD AUTO: 3.46 K/UL (ref 2–7.15)
NEUTROPHILS NFR BLD: 59.3 % (ref 44–72)
NRBC # BLD AUTO: 0 K/UL
NRBC BLD-RTO: 0 /100 WBC
PLATELET # BLD AUTO: 283 K/UL (ref 164–446)
PMV BLD AUTO: 11.8 FL (ref 9–12.9)
POTASSIUM SERPL-SCNC: 3.7 MMOL/L (ref 3.6–5.5)
PREALB SERPL-MCNC: 13.7 MG/DL (ref 18–38)
PROT SERPL-MCNC: 6.4 G/DL (ref 6–8.2)
RBC # BLD AUTO: 4 M/UL (ref 4.2–5.4)
SODIUM SERPL-SCNC: 141 MMOL/L (ref 135–145)
TRANSFERRIN SERPL-MCNC: 291 MG/DL (ref 200–370)
TRIGL SERPL-MCNC: 58 MG/DL (ref 0–149)
VIT B12 SERPL-MCNC: 902 PG/ML (ref 211–911)
WBC # BLD AUTO: 5.8 K/UL (ref 4.8–10.8)

## 2022-07-13 PROCEDURE — 36415 COLL VENOUS BLD VENIPUNCTURE: CPT

## 2022-07-13 PROCEDURE — 80053 COMPREHEN METABOLIC PANEL: CPT

## 2022-07-13 PROCEDURE — 82746 ASSAY OF FOLIC ACID SERUM: CPT

## 2022-07-13 PROCEDURE — 84466 ASSAY OF TRANSFERRIN: CPT

## 2022-07-13 PROCEDURE — 83540 ASSAY OF IRON: CPT

## 2022-07-13 PROCEDURE — 85025 COMPLETE CBC W/AUTO DIFF WBC: CPT

## 2022-07-13 PROCEDURE — 84252 ASSAY OF VITAMIN B-2: CPT

## 2022-07-13 PROCEDURE — 84207 ASSAY OF VITAMIN B-6: CPT

## 2022-07-13 PROCEDURE — 84134 ASSAY OF PREALBUMIN: CPT

## 2022-07-13 PROCEDURE — 84425 ASSAY OF VITAMIN B-1: CPT

## 2022-07-13 PROCEDURE — 84630 ASSAY OF ZINC: CPT

## 2022-07-13 PROCEDURE — 82607 VITAMIN B-12: CPT

## 2022-07-13 PROCEDURE — 82728 ASSAY OF FERRITIN: CPT

## 2022-07-13 PROCEDURE — 80061 LIPID PANEL: CPT

## 2022-07-13 PROCEDURE — 82652 VIT D 1 25-DIHYDROXY: CPT

## 2022-07-15 LAB — 1,25(OH)2D3 SERPL-MCNC: 93 PG/ML (ref 19.9–79.3)

## 2022-07-16 LAB — ZINC BLD-MCNC: 501.7 UG/DL (ref 440–860)

## 2022-07-17 LAB
VIT B2 SERPL-SCNC: 9 NMOL/L (ref 5–50)
VIT B6 SERPL-MCNC: 22.1 NMOL/L (ref 20–125)

## 2022-07-18 LAB — VIT B1 BLD-MCNC: 120 NMOL/L (ref 70–180)

## 2022-11-14 ENCOUNTER — HOSPITAL ENCOUNTER (OUTPATIENT)
Dept: LAB | Facility: MEDICAL CENTER | Age: 52
End: 2022-11-14
Attending: NURSE PRACTITIONER
Payer: MEDICAID

## 2022-11-14 LAB
25(OH)D3 SERPL-MCNC: 16 NG/ML (ref 30–100)
ALBUMIN SERPL BCP-MCNC: 3.9 G/DL (ref 3.2–4.9)
ALBUMIN/GLOB SERPL: 1.2 G/DL
ALP SERPL-CCNC: 85 U/L (ref 30–99)
ALT SERPL-CCNC: 11 U/L (ref 2–50)
ANION GAP SERPL CALC-SCNC: 11 MMOL/L (ref 7–16)
AST SERPL-CCNC: 13 U/L (ref 12–45)
BASOPHILS # BLD AUTO: 0.6 % (ref 0–1.8)
BASOPHILS # BLD: 0.04 K/UL (ref 0–0.12)
BILIRUB SERPL-MCNC: 0.3 MG/DL (ref 0.1–1.5)
BUN SERPL-MCNC: 7 MG/DL (ref 8–22)
CALCIUM SERPL-MCNC: 9 MG/DL (ref 8.5–10.5)
CHLORIDE SERPL-SCNC: 107 MMOL/L (ref 96–112)
CHOLEST SERPL-MCNC: 133 MG/DL (ref 100–199)
CO2 SERPL-SCNC: 23 MMOL/L (ref 20–33)
CREAT SERPL-MCNC: 0.79 MG/DL (ref 0.5–1.4)
EOSINOPHIL # BLD AUTO: 0.03 K/UL (ref 0–0.51)
EOSINOPHIL NFR BLD: 0.4 % (ref 0–6.9)
ERYTHROCYTE [DISTWIDTH] IN BLOOD BY AUTOMATED COUNT: 46.5 FL (ref 35.9–50)
EST. AVERAGE GLUCOSE BLD GHB EST-MCNC: 91 MG/DL
FASTING STATUS PATIENT QL REPORTED: NORMAL
FERRITIN SERPL-MCNC: 22.7 NG/ML (ref 10–291)
FOLATE SERPL-MCNC: 3.6 NG/ML
GFR SERPLBLD CREATININE-BSD FMLA CKD-EPI: 91 ML/MIN/1.73 M 2
GLOBULIN SER CALC-MCNC: 3.2 G/DL (ref 1.9–3.5)
GLUCOSE SERPL-MCNC: 94 MG/DL (ref 65–99)
HBA1C MFR BLD: 4.8 % (ref 4–5.6)
HCT VFR BLD AUTO: 38.9 % (ref 37–47)
HDLC SERPL-MCNC: 60 MG/DL
HGB BLD-MCNC: 12.8 G/DL (ref 12–16)
IMM GRANULOCYTES # BLD AUTO: 0.01 K/UL (ref 0–0.11)
IMM GRANULOCYTES NFR BLD AUTO: 0.1 % (ref 0–0.9)
IRON SERPL-MCNC: 67 UG/DL (ref 40–170)
LDLC SERPL CALC-MCNC: 62 MG/DL
LYMPHOCYTES # BLD AUTO: 1.58 K/UL (ref 1–4.8)
LYMPHOCYTES NFR BLD: 23 % (ref 22–41)
MCH RBC QN AUTO: 29.8 PG (ref 27–33)
MCHC RBC AUTO-ENTMCNC: 32.9 G/DL (ref 33.6–35)
MCV RBC AUTO: 90.5 FL (ref 81.4–97.8)
MONOCYTES # BLD AUTO: 0.39 K/UL (ref 0–0.85)
MONOCYTES NFR BLD AUTO: 5.7 % (ref 0–13.4)
NEUTROPHILS # BLD AUTO: 4.83 K/UL (ref 2–7.15)
NEUTROPHILS NFR BLD: 70.2 % (ref 44–72)
NRBC # BLD AUTO: 0 K/UL
NRBC BLD-RTO: 0 /100 WBC
PLATELET # BLD AUTO: 251 K/UL (ref 164–446)
PMV BLD AUTO: 11.5 FL (ref 9–12.9)
POTASSIUM SERPL-SCNC: 4 MMOL/L (ref 3.6–5.5)
PREALB SERPL-MCNC: 14.1 MG/DL (ref 18–38)
PROT SERPL-MCNC: 7.1 G/DL (ref 6–8.2)
RBC # BLD AUTO: 4.3 M/UL (ref 4.2–5.4)
SODIUM SERPL-SCNC: 141 MMOL/L (ref 135–145)
TRANSFERRIN SERPL-MCNC: 285 MG/DL (ref 200–370)
TRIGL SERPL-MCNC: 56 MG/DL (ref 0–149)
TSH SERPL DL<=0.005 MIU/L-ACNC: 1.35 UIU/ML (ref 0.38–5.33)
VIT B12 SERPL-MCNC: 1063 PG/ML (ref 211–911)
WBC # BLD AUTO: 6.9 K/UL (ref 4.8–10.8)

## 2022-11-14 PROCEDURE — 82728 ASSAY OF FERRITIN: CPT

## 2022-11-14 PROCEDURE — 84252 ASSAY OF VITAMIN B-2: CPT

## 2022-11-14 PROCEDURE — 83540 ASSAY OF IRON: CPT

## 2022-11-14 PROCEDURE — 84425 ASSAY OF VITAMIN B-1: CPT

## 2022-11-14 PROCEDURE — 36415 COLL VENOUS BLD VENIPUNCTURE: CPT

## 2022-11-14 PROCEDURE — 84134 ASSAY OF PREALBUMIN: CPT

## 2022-11-14 PROCEDURE — 82746 ASSAY OF FOLIC ACID SERUM: CPT

## 2022-11-14 PROCEDURE — 84630 ASSAY OF ZINC: CPT

## 2022-11-14 PROCEDURE — 83036 HEMOGLOBIN GLYCOSYLATED A1C: CPT

## 2022-11-14 PROCEDURE — 82306 VITAMIN D 25 HYDROXY: CPT

## 2022-11-14 PROCEDURE — 85025 COMPLETE CBC W/AUTO DIFF WBC: CPT

## 2022-11-14 PROCEDURE — 82607 VITAMIN B-12: CPT

## 2022-11-14 PROCEDURE — 84443 ASSAY THYROID STIM HORMONE: CPT

## 2022-11-14 PROCEDURE — 80061 LIPID PANEL: CPT

## 2022-11-14 PROCEDURE — 84207 ASSAY OF VITAMIN B-6: CPT

## 2022-11-14 PROCEDURE — 80053 COMPREHEN METABOLIC PANEL: CPT

## 2022-11-14 PROCEDURE — 84466 ASSAY OF TRANSFERRIN: CPT

## 2022-11-16 LAB — ZINC SERPL-MCNC: 46.1 UG/DL (ref 60–120)

## 2022-11-18 LAB
VIT B2 SERPL-SCNC: 7 NMOL/L (ref 5–50)
VIT B6 SERPL-MCNC: 46.9 NMOL/L (ref 20–125)

## 2022-11-19 LAB — VIT B1 BLD-MCNC: 111 NMOL/L (ref 70–180)

## 2023-03-06 ENCOUNTER — OFFICE VISIT (OUTPATIENT)
Dept: URGENT CARE | Facility: PHYSICIAN GROUP | Age: 53
End: 2023-03-06
Payer: MEDICAID

## 2023-03-06 VITALS
HEART RATE: 71 BPM | BODY MASS INDEX: 45.99 KG/M2 | RESPIRATION RATE: 16 BRPM | HEIGHT: 67 IN | SYSTOLIC BLOOD PRESSURE: 122 MMHG | DIASTOLIC BLOOD PRESSURE: 86 MMHG | TEMPERATURE: 96.9 F | OXYGEN SATURATION: 96 % | WEIGHT: 293 LBS

## 2023-03-06 DIAGNOSIS — H65.195 OTHER RECURRENT ACUTE NONSUPPURATIVE OTITIS MEDIA OF LEFT EAR: ICD-10-CM

## 2023-03-06 PROCEDURE — 99213 OFFICE O/P EST LOW 20 MIN: CPT | Performed by: FAMILY MEDICINE

## 2023-03-06 RX ORDER — AMOXICILLIN 875 MG/1
875 TABLET, COATED ORAL 2 TIMES DAILY
Qty: 14 TABLET | Refills: 0 | Status: SHIPPED | OUTPATIENT
Start: 2023-03-06 | End: 2023-03-13

## 2023-03-06 RX ORDER — LISINOPRIL AND HYDROCHLOROTHIAZIDE 20; 12.5 MG/1; MG/1
1 TABLET ORAL DAILY
COMMUNITY
Start: 2023-02-13 | End: 2023-09-22

## 2023-03-06 RX ORDER — CITALOPRAM 20 MG/1
TABLET ORAL
COMMUNITY
End: 2023-06-14

## 2023-03-06 RX ORDER — ENOXAPARIN SODIUM 100 MG/ML
INJECTION SUBCUTANEOUS
COMMUNITY
End: 2023-06-14

## 2023-03-06 RX ORDER — OMEPRAZOLE 40 MG/1
40 CAPSULE, DELAYED RELEASE ORAL
COMMUNITY
Start: 2022-11-08 | End: 2023-09-22

## 2023-03-06 RX ORDER — ALLOPURINOL 100 MG/1
TABLET ORAL
COMMUNITY

## 2023-03-06 RX ORDER — ERGOCALCIFEROL 1.25 MG/1
CAPSULE ORAL
COMMUNITY
Start: 2023-03-02 | End: 2023-06-14

## 2023-03-06 RX ORDER — LISINOPRIL AND HYDROCHLOROTHIAZIDE 20; 12.5 MG/1; MG/1
TABLET ORAL
COMMUNITY
Start: 2022-11-08 | End: 2023-11-03

## 2023-03-06 RX ORDER — LEVONORGESTREL 52 MG/1
INTRAUTERINE DEVICE INTRAUTERINE
COMMUNITY
End: 2023-06-14

## 2023-03-06 RX ORDER — SPIRONOLACTONE 25 MG/1
TABLET ORAL
COMMUNITY
Start: 2021-12-13 | End: 2023-11-03

## 2023-03-06 RX ORDER — TOPIRAMATE 100 MG/1
TABLET, FILM COATED ORAL
COMMUNITY
Start: 2023-02-27 | End: 2023-09-22

## 2023-03-06 RX ORDER — OMEPRAZOLE 40 MG/1
40 CAPSULE, DELAYED RELEASE ORAL DAILY
COMMUNITY
Start: 2023-02-12 | End: 2023-06-14

## 2023-03-06 RX ORDER — CARIPRAZINE 4.5 MG/1
CAPSULE, GELATIN COATED ORAL
COMMUNITY
Start: 2023-02-26

## 2023-03-06 RX ORDER — ERGOCALCIFEROL 1.25 MG/1
1 CAPSULE ORAL
COMMUNITY
Start: 2022-12-05

## 2023-03-06 ASSESSMENT — FIBROSIS 4 INDEX: FIB4 SCORE: 0.78

## 2023-03-06 NOTE — PROGRESS NOTES
Chief Complaint   Patient presents with    Otalgia    Fever     Subjective:      Chief Complaint   Patient presents with    Otalgia    Fever               Otalgia - left  This is a new problem. The current episode started in the past 7 days. The problem occurs constantly. The problem has been unchanged. Associated symptoms include congestion and coughing. Pertinent negatives include no abdominal pain, chest pain, chills, fever, headaches, joint swelling, myalgias, nausea, neck pain, rash or visual change. Nothing aggravates the symptoms. She has tried nothing for the symptoms.     Social History     Tobacco Use    Smoking status: Former     Packs/day: 1.00     Years: 12.00     Pack years: 12.00     Types: Cigarettes     Quit date: 2019     Years since quittin.0    Smokeless tobacco: Never    Tobacco comments:     2 yrs  / ppd   Vaping Use    Vaping Use: Never used   Substance Use Topics    Alcohol use: Yes     Comment: 1 pint of whiskey nightly     Drug use: No     Comment: meth  none since          Current Outpatient Medications on File Prior to Visit   Medication Sig Dispense Refill    allopurinol (ZYLOPRIM) 100 MG Tab take 1 tablet (100 mg) by oral route once daily Oral 1      ergocalciferol (DRISDOL) 88404 UNIT capsule 1 Capsule.      omeprazole (PRILOSEC) 40 MG delayed-release capsule Take 40 mg by mouth.      topiramate (TOPAMAX) 100 MG Tab       VRAYLAR 4.5 MG Cap       lisinopril-hydrochlorothiazide (PRINZIDE) 20-12.5 MG per tablet Take  by mouth.      spironolactone (ALDACTONE) 25 MG Tab Take  by mouth.      prazosin (MINIPRESS) 5 MG Cap       naltrexone (DEPADE) 50 MG Tab TAKE 1 TABLET BY MOUTH EVERY NIGHT AT BEDTIME FOR CRAVINGS      acyclovir (ZOVIRAX) 800 MG Tab TAKE 1 TABLET BY MOUTH TWICE DAILY FOR 5 DAYS      topiramate (TOPAMAX) 50 MG tablet       B Complex Vitamins (VITAMIN-B COMPLEX PO)       busPIRone (BUSPAR) 10 MG Tab tablet Take 10 mg by mouth.      citalopram (CELEXA) 20 MG Tab  take 1 tablet (20 mg) by oral route once daily Oral 1 (Patient not taking: Reported on 3/6/2023)      enoxaparin (LOVENOX) 40 MG/0.4ML Solution Prefilled Syringe inj enoxaparin 40 mg/0.4 mL subcutaneous syringe (Patient not taking: Reported on 3/6/2023)      vitamin D2, Ergocalciferol, (DRISDOL) 1.25 MG (07651 UT) Cap capsule  (Patient not taking: Reported on 3/6/2023)      levonorgestrel (MIRENA, 52 MG,) 20 MCG/DAY IUD Mirena 21 mcg/24 hours (8 yrs) 52 mg intrauterine device (Patient not taking: Reported on 3/6/2023)      metFORMIN (GLUCOPHAGE) 500 MG Tab TAKE 1 TABLET BY MOUTH TWICE DAILY WITH A MEAL for 30 (Patient not taking: Reported on 3/6/2023)      omeprazole (PRILOSEC) 40 MG delayed-release capsule Take 40 mg by mouth every day. (Patient not taking: Reported on 3/6/2023)      lisinopril-hydrochlorothiazide (PRINZIDE) 20-12.5 MG per tablet Take 1 Tablet by mouth every day. (Patient not taking: Reported on 3/6/2023)      lisinopril (PRINIVIL) 20 MG Tab Take 1 Tablet by mouth every day. (Patient not taking: Reported on 3/6/2023)       No current facility-administered medications on file prior to visit.         Past Medical History:   Diagnosis Date    Pain 2014    left knee    Cancer (HCC) 2009    uterine    Anemia     during pregnancy    Anxiety     Bowel habit changes     constipation    Cold     Dental disorder     lower partial    Gynecological disorder     menorrhagia    Heart burn     Hiatus hernia syndrome     Hypertension     Psychiatric problem     anxiety/depression         Family History   Problem Relation Age of Onset    Breast Cancer Father           Review of Systems   Constitutional: +fatigue  HENT: Positive for congestion and ear pain. Negative for hearing loss and tinnitus.    Respiratory:   Negative for hemoptysis, shortness of breath and wheezing.    Cardiovascular: Negative for chest pain, palpitations and leg swelling.   Gastrointestinal: Negative for nausea and abdominal pain.  "  Musculoskeletal: Negative for myalgias, joint swelling and neck pain.   Skin: Negative for rash.   Neurological: Negative for headaches.   All other systems reviewed and are negative.         Objective:     /86   Pulse 71   Temp 36.1 °C (96.9 °F) (Temporal)   Resp 16   Ht 1.702 m (5' 7\")   Wt (!) 133 kg (294 lb)   SpO2 96%     Physical Exam   Constitutional: Vital signs are normal.  No distress.   HENT:   Head: There is normal jaw occlusion.   Right Ear: External ear normal. Tympanic membrane is normal. No middle ear effusion.   Left Ear: External ear normal. Tympanic membrane is abnormal - erythematous and bulging. A middle ear effusion is present.   Nose: Rhinorrhea and congestion present. No nasal discharge.   Mouth/Throat: Mucous membranes are moist. No oral lesions. Pharynx erythema present. No oropharyngeal exudate, pharynx swelling or pharynx petechiae. Tonsils are 0 on the right. Tonsils are 0 on the left. No tonsillar exudate.   Eyes: Conjunctivae and EOM are normal. Pupils are equal, round, and reactive to light. Right eye exhibits no discharge. Left eye exhibits no discharge.   Neck: Normal range of motion. Neck supple. Cervical adenopathy present.   Cardiovascular: Normal rate and regular rhythm.  Pulses are palpable.    No murmur heard.  Pulmonary/Chest: Effort normal and breath sounds normal. There is normal air entry. No respiratory distress. no wheezes, rhonchi,  retraction.   Musculoskeletal:   no edema.   Neurological: A/O x 3.   CN 2-12 intact   Skin: Skin is warm. Capillary refill takes less than 3 seconds. No purpura and no rash noted. Patient is not diaphoretic. No jaundice or pallor.   Nursing note and vitals reviewed.              Assessment/Plan:     1. Other recurrent acute nonsuppurative otitis media of left ear    - amoxicillin (AMOXIL) 875 MG tablet; Take 1 Tablet by mouth 2 times a day for 7 days.  Dispense: 14 Tablet; Refill: 0  - Referral to ENT       "

## 2023-04-03 ENCOUNTER — HOSPITAL ENCOUNTER (OUTPATIENT)
Dept: LAB | Facility: MEDICAL CENTER | Age: 53
End: 2023-04-03
Attending: NURSE PRACTITIONER
Payer: MEDICAID

## 2023-04-03 LAB
ALBUMIN SERPL BCP-MCNC: 4 G/DL (ref 3.2–4.9)
ALBUMIN/GLOB SERPL: 1.4 G/DL
ALP SERPL-CCNC: 93 U/L (ref 30–99)
ALT SERPL-CCNC: 12 U/L (ref 2–50)
ANION GAP SERPL CALC-SCNC: 11 MMOL/L (ref 7–16)
AST SERPL-CCNC: 10 U/L (ref 12–45)
BASOPHILS # BLD AUTO: 0.6 % (ref 0–1.8)
BASOPHILS # BLD: 0.04 K/UL (ref 0–0.12)
BILIRUB SERPL-MCNC: 0.6 MG/DL (ref 0.1–1.5)
BUN SERPL-MCNC: 10 MG/DL (ref 8–22)
CALCIUM ALBUM COR SERPL-MCNC: 8.5 MG/DL (ref 8.5–10.5)
CALCIUM SERPL-MCNC: 8.5 MG/DL (ref 8.5–10.5)
CHLORIDE SERPL-SCNC: 105 MMOL/L (ref 96–112)
CHOLEST SERPL-MCNC: 127 MG/DL (ref 100–199)
CO2 SERPL-SCNC: 21 MMOL/L (ref 20–33)
CREAT SERPL-MCNC: 0.81 MG/DL (ref 0.5–1.4)
EOSINOPHIL # BLD AUTO: 0.04 K/UL (ref 0–0.51)
EOSINOPHIL NFR BLD: 0.6 % (ref 0–6.9)
ERYTHROCYTE [DISTWIDTH] IN BLOOD BY AUTOMATED COUNT: 43.9 FL (ref 35.9–50)
FASTING STATUS PATIENT QL REPORTED: NORMAL
FERRITIN SERPL-MCNC: 33.4 NG/ML (ref 10–291)
FOLATE SERPL-MCNC: 2.8 NG/ML
GFR SERPLBLD CREATININE-BSD FMLA CKD-EPI: 88 ML/MIN/1.73 M 2
GLOBULIN SER CALC-MCNC: 2.8 G/DL (ref 1.9–3.5)
GLUCOSE SERPL-MCNC: 92 MG/DL (ref 65–99)
HCT VFR BLD AUTO: 40.4 % (ref 37–47)
HDLC SERPL-MCNC: 71 MG/DL
HGB BLD-MCNC: 13.2 G/DL (ref 12–16)
IMM GRANULOCYTES # BLD AUTO: 0.01 K/UL (ref 0–0.11)
IMM GRANULOCYTES NFR BLD AUTO: 0.1 % (ref 0–0.9)
IRON SERPL-MCNC: 167 UG/DL (ref 40–170)
LDLC SERPL CALC-MCNC: 46 MG/DL
LYMPHOCYTES # BLD AUTO: 2.24 K/UL (ref 1–4.8)
LYMPHOCYTES NFR BLD: 32 % (ref 22–41)
MCH RBC QN AUTO: 30.2 PG (ref 27–33)
MCHC RBC AUTO-ENTMCNC: 32.7 G/DL (ref 33.6–35)
MCV RBC AUTO: 92.4 FL (ref 81.4–97.8)
MONOCYTES # BLD AUTO: 0.33 K/UL (ref 0–0.85)
MONOCYTES NFR BLD AUTO: 4.7 % (ref 0–13.4)
NEUTROPHILS # BLD AUTO: 4.33 K/UL (ref 2–7.15)
NEUTROPHILS NFR BLD: 62 % (ref 44–72)
NRBC # BLD AUTO: 0 K/UL
NRBC BLD-RTO: 0 /100 WBC
PLATELET # BLD AUTO: 245 K/UL (ref 164–446)
PMV BLD AUTO: 11.6 FL (ref 9–12.9)
POTASSIUM SERPL-SCNC: 3.4 MMOL/L (ref 3.6–5.5)
PREALB SERPL-MCNC: 15.3 MG/DL (ref 18–38)
PROT SERPL-MCNC: 6.8 G/DL (ref 6–8.2)
RBC # BLD AUTO: 4.37 M/UL (ref 4.2–5.4)
SODIUM SERPL-SCNC: 137 MMOL/L (ref 135–145)
TRANSFERRIN SERPL-MCNC: 285 MG/DL (ref 200–370)
TRIGL SERPL-MCNC: 52 MG/DL (ref 0–149)
TSH SERPL DL<=0.005 MIU/L-ACNC: 1.36 UIU/ML (ref 0.38–5.33)
VIT B12 SERPL-MCNC: 396 PG/ML (ref 211–911)
WBC # BLD AUTO: 7 K/UL (ref 4.8–10.8)

## 2023-04-03 PROCEDURE — 82746 ASSAY OF FOLIC ACID SERUM: CPT

## 2023-04-03 PROCEDURE — 84134 ASSAY OF PREALBUMIN: CPT

## 2023-04-03 PROCEDURE — 84207 ASSAY OF VITAMIN B-6: CPT

## 2023-04-03 PROCEDURE — 84252 ASSAY OF VITAMIN B-2: CPT

## 2023-04-03 PROCEDURE — 84466 ASSAY OF TRANSFERRIN: CPT

## 2023-04-03 PROCEDURE — 83540 ASSAY OF IRON: CPT

## 2023-04-03 PROCEDURE — 84425 ASSAY OF VITAMIN B-1: CPT

## 2023-04-03 PROCEDURE — 84630 ASSAY OF ZINC: CPT

## 2023-04-03 PROCEDURE — 36415 COLL VENOUS BLD VENIPUNCTURE: CPT

## 2023-04-03 PROCEDURE — 82652 VIT D 1 25-DIHYDROXY: CPT

## 2023-04-03 PROCEDURE — 80053 COMPREHEN METABOLIC PANEL: CPT

## 2023-04-03 PROCEDURE — 82607 VITAMIN B-12: CPT

## 2023-04-03 PROCEDURE — 85025 COMPLETE CBC W/AUTO DIFF WBC: CPT

## 2023-04-03 PROCEDURE — 82728 ASSAY OF FERRITIN: CPT

## 2023-04-03 PROCEDURE — 80061 LIPID PANEL: CPT

## 2023-04-03 PROCEDURE — 84443 ASSAY THYROID STIM HORMONE: CPT

## 2023-04-05 LAB
1,25(OH)2D3 SERPL-MCNC: 121 PG/ML (ref 19.9–79.3)
ZINC SERPL-MCNC: 61.1 UG/DL (ref 60–120)

## 2023-04-06 LAB
VIT B1 BLD-MCNC: 141 NMOL/L (ref 70–180)
VIT B2 SERPL-SCNC: 6 NMOL/L (ref 5–50)

## 2023-04-07 LAB — VIT B6 SERPL-MCNC: 40.3 NMOL/L (ref 20–125)

## 2023-06-14 ENCOUNTER — OFFICE VISIT (OUTPATIENT)
Dept: URGENT CARE | Facility: PHYSICIAN GROUP | Age: 53
End: 2023-06-14
Payer: MEDICAID

## 2023-06-14 VITALS
OXYGEN SATURATION: 97 % | DIASTOLIC BLOOD PRESSURE: 88 MMHG | WEIGHT: 293 LBS | TEMPERATURE: 96.5 F | HEIGHT: 67 IN | SYSTOLIC BLOOD PRESSURE: 128 MMHG | RESPIRATION RATE: 18 BRPM | BODY MASS INDEX: 45.99 KG/M2 | HEART RATE: 71 BPM

## 2023-06-14 DIAGNOSIS — N75.0 BARTHOLIN CYST: ICD-10-CM

## 2023-06-14 PROBLEM — R10.30 INGUINAL PAIN: Status: ACTIVE | Noted: 2023-06-14

## 2023-06-14 PROBLEM — F31.32 BIPOLAR DISORDER WITH MODERATE DEPRESSION (HCC): Status: ACTIVE | Noted: 2023-06-14

## 2023-06-14 PROBLEM — M19.90 ARTHRITIS: Status: ACTIVE | Noted: 2023-06-14

## 2023-06-14 PROBLEM — N94.5 SECONDARY DYSMENORRHEA: Status: ACTIVE | Noted: 2023-06-14

## 2023-06-14 PROBLEM — G43.909 MIGRAINE HEADACHE: Status: ACTIVE | Noted: 2023-06-14

## 2023-06-14 PROBLEM — F41.9 ANXIETY: Status: ACTIVE | Noted: 2023-06-14

## 2023-06-14 PROBLEM — M10.9 ARTICULAR GOUT: Status: ACTIVE | Noted: 2023-06-14

## 2023-06-14 PROBLEM — J30.2 SEASONAL ALLERGIC RHINITIS: Status: ACTIVE | Noted: 2023-06-14

## 2023-06-14 PROBLEM — F43.10 POSTTRAUMATIC STRESS DISORDER: Status: ACTIVE | Noted: 2023-06-14

## 2023-06-14 PROBLEM — R63.5 WEIGHT GAIN: Status: ACTIVE | Noted: 2023-06-14

## 2023-06-14 PROBLEM — F10.10 ALCOHOL ABUSE: Status: ACTIVE | Noted: 2023-06-14

## 2023-06-14 PROBLEM — F33.1 MODERATE EPISODE OF RECURRENT MAJOR DEPRESSIVE DISORDER (HCC): Status: ACTIVE | Noted: 2023-06-14

## 2023-06-14 PROBLEM — N84.0 POLYP OF CORPUS UTERI: Status: ACTIVE | Noted: 2023-06-14

## 2023-06-14 PROBLEM — N92.1 MENOMETRORRHAGIA: Status: ACTIVE | Noted: 2023-06-14

## 2023-06-14 PROBLEM — A60.00 GENITAL HERPES SIMPLEX: Status: ACTIVE | Noted: 2023-06-14

## 2023-06-14 PROCEDURE — 3074F SYST BP LT 130 MM HG: CPT | Performed by: PHYSICIAN ASSISTANT

## 2023-06-14 PROCEDURE — 3079F DIAST BP 80-89 MM HG: CPT | Performed by: PHYSICIAN ASSISTANT

## 2023-06-14 PROCEDURE — 10061 I&D ABSCESS COMP/MULTIPLE: CPT | Performed by: PHYSICIAN ASSISTANT

## 2023-06-14 RX ORDER — SULFAMETHOXAZOLE AND TRIMETHOPRIM 800; 160 MG/1; MG/1
1 TABLET ORAL 2 TIMES DAILY
Qty: 14 TABLET | Refills: 0 | Status: SHIPPED | OUTPATIENT
Start: 2023-06-14 | End: 2023-06-21

## 2023-06-14 ASSESSMENT — ENCOUNTER SYMPTOMS
CONSTITUTIONAL NEGATIVE: 1
RESPIRATORY NEGATIVE: 1
CARDIOVASCULAR NEGATIVE: 1
VOMITING: 0
FLANK PAIN: 0
DIARRHEA: 0
NAUSEA: 0
ABDOMINAL PAIN: 0

## 2023-06-14 ASSESSMENT — FIBROSIS 4 INDEX: FIB4 SCORE: 0.59

## 2023-06-14 NOTE — PROGRESS NOTES
"  Subjective:     Nneka Ruiz  is a 52 y.o. female who presents for Cyst (Cyst on \"lady lip\" has tripled in size x 3 days )       She presents today with a possible abscess present over the left labia that has been present over the past 5 to 6 days, she was seen by her OB/GYN 3 days ago whom stated that she was not concerned about the abscess.  Since that time patient states the abscess has grown 3 times in size.  It is extremely painful.  No active bleeding or drainage noted.  No fever/chills/sweats, no chest pain or shortness of breath.       Review of Systems   Constitutional: Negative.    Respiratory: Negative.     Cardiovascular: Negative.    Gastrointestinal:  Negative for abdominal pain, diarrhea, nausea and vomiting.   Genitourinary:  Negative for dysuria, flank pain, frequency, hematuria and urgency.        Possible abscess over left labia      Allergies   Allergen Reactions    Aspirin Vomiting    Aspirin Vomiting    Other Misc      Waterproof band aids took off skin and caused infection     Nsaids Unspecified     Pt states not allergic to ALL nsaids, just \"not supposed to take them after gastric sleeve surgery\"     Past Medical History:   Diagnosis Date    Anemia     during pregnancy    Anxiety     Bowel habit changes     constipation    Cancer (HCC) 2009    uterine    Cold     Dental disorder     lower partial    Gynecological disorder     menorrhagia    Heart burn     Hiatus hernia syndrome     Hypertension     Pain 2014    left knee    Psychiatric problem     anxiety/depression        Objective:   /88   Pulse 71   Temp 35.8 °C (96.5 °F) (Temporal)   Resp 18   Ht 1.702 m (5' 7\")   Wt (!) 133 kg (294 lb)   SpO2 97%   BMI 46.05 kg/m²   Physical Exam  Vitals and nursing note reviewed. Exam conducted with a chaperone present.   Constitutional:       General: She is not in acute distress.     Appearance: She is not ill-appearing or toxic-appearing.   HENT:      Head: Normocephalic.      " "Nose: No rhinorrhea.   Eyes:      General: No scleral icterus.     Conjunctiva/sclera: Conjunctivae normal.   Pulmonary:      Effort: Pulmonary effort is normal. No respiratory distress.      Breath sounds: No stridor.   Genitourinary:         Comments: There is an area of erythema with induration present over the above marked region of the left labial fold.  No active bleeding or drainage.  Musculoskeletal:      Cervical back: Neck supple.   Neurological:      Mental Status: She is alert and oriented to person, place, and time.   Psychiatric:         Mood and Affect: Mood normal.         Behavior: Behavior normal.         Thought Content: Thought content normal.         Judgment: Judgment normal.             Diagnostic testing: None    Assessment/Plan:     Encounter Diagnoses   Name Primary?    Bartholin cyst      Procedure: Incision and Drainage of left Bartholin cyst  -Risks, benefits, and alternatives discussed. Risks include infection, bleeding, nerve damage, and poor cosmetic outcome  -Clean technique with sterile instruments  -Local anesthesia with 2% lidocaine with epinephrine  -Incision with #11 blade into fluctuant area with purulent material expressed  -Culture obtained and packaged for lab  -Cavity probed and any loculations bluntly taken down with hemostat  -Irrigated copiously with sterile saline  -Packed with 1/4\" gauze  -Minimal bleeding with good hemostasis achieved  -The patient tolerated the procedure well         Plan for care for today's complaint includes Bactrim for antibiotic treatment.  I&D was performed today, please see above-stated procedure details for more information.  Return to urgent care in 2 days for wound check and possible repacking.  Patient is scheduled with her OB/GYN on 6/19/2023, keep that appointment as scheduled.  Discussed signs and symptoms of worsening pathology, if these do arise return to urgent care at that time..  Prescription for Bactrim provided.    See AVS " Instructions below for written guidance provided to patient on after-visit management and care in addition to our verbal discussion during the visit.    Please note that this dictation was created using voice recognition software. I have attempted to correct all errors, but there may be sound-alike, spelling, grammar and possibly content errors that I did not discover before finalizing the note.    [place] Marcelo PA-C

## 2023-06-16 ENCOUNTER — OFFICE VISIT (OUTPATIENT)
Dept: URGENT CARE | Facility: PHYSICIAN GROUP | Age: 53
End: 2023-06-16
Payer: MEDICAID

## 2023-06-16 VITALS
HEART RATE: 72 BPM | WEIGHT: 293 LBS | OXYGEN SATURATION: 100 % | SYSTOLIC BLOOD PRESSURE: 126 MMHG | TEMPERATURE: 97.9 F | DIASTOLIC BLOOD PRESSURE: 80 MMHG | RESPIRATION RATE: 16 BRPM | HEIGHT: 67 IN | BODY MASS INDEX: 45.99 KG/M2

## 2023-06-16 DIAGNOSIS — N75.0 BARTHOLIN CYST: ICD-10-CM

## 2023-06-16 PROCEDURE — 3079F DIAST BP 80-89 MM HG: CPT | Performed by: FAMILY MEDICINE

## 2023-06-16 PROCEDURE — 3074F SYST BP LT 130 MM HG: CPT | Performed by: FAMILY MEDICINE

## 2023-06-16 PROCEDURE — 99213 OFFICE O/P EST LOW 20 MIN: CPT | Performed by: FAMILY MEDICINE

## 2023-06-16 RX ORDER — SPIRONOLACTONE 25 MG/1
TABLET ORAL
COMMUNITY
Start: 2022-11-08 | End: 2023-09-22

## 2023-06-16 ASSESSMENT — FIBROSIS 4 INDEX: FIB4 SCORE: 0.59

## 2023-06-16 NOTE — PROGRESS NOTES
"      here for wound check:       S/p  left labial cyst I+D on       Here for f/u            Past Medical History:   Diagnosis Date    Anemia     during pregnancy    Anxiety     Bowel habit changes     constipation    Cancer (HCC) 2009    uterine    Cold     Dental disorder     lower partial    Gynecological disorder     menorrhagia    Heart burn     Hiatus hernia syndrome     Hypertension     Pain 2014    left knee    Psychiatric problem     anxiety/depression         Social History     Tobacco Use    Smoking status: Former     Packs/day: 1.00     Years: 12.00     Pack years: 12.00     Types: Cigarettes     Quit date: 2019     Years since quittin.3    Smokeless tobacco: Never    Tobacco comments:     2 yrs  1/2 ppd   Vaping Use    Vaping Use: Never used   Substance Use Topics    Alcohol use: Yes     Comment: 1 pint of whiskey nightly     Drug use: No     Comment: meth  none since                  Review of Systems   Constitutional: Negative for fever, chills and malaise/fatigue.   Eyes: Negative for vision changes, d/c.    Respiratory: Negative for cough and sputum production.    Cardiovascular: Negative for chest pain and palpitations.   Gastrointestinal: Negative for nausea, vomiting, abdominal pain, diarrhea and constipation.   Genitourinary: Negative for dysuria, urgency and frequency.   Skin: Negative for rash or  itching.   Neurological: Negative for dizziness and tingling.   Psychiatric/Behavioral: Negative for depression.   Hematologic/lymphatic - denies bruising or excessive bleeding  All other systems reviewed and are negative.       Objective:     /80   Pulse 72   Temp 36.6 °C (97.9 °F) (Temporal)   Resp 16   Ht 1.702 m (5' 7\")   Wt (!) 133 kg (294 lb)   SpO2 100%         Physical Exam   Constitutional: pt is oriented to person, place, and time. Pt appears well-developed. No distress.   HENT:   Head: Normocephalic and atraumatic.   Eyes: Conjunctivae are normal. "   Cardiovascular: Normal rate.    Pulmonary/Chest: Effort normal.   Musculoskeletal:     Left labia:    abscess cavity is closed.   No erythema.   No d/c.   No induration or fluctuance.      normal range of motion and normal capillary refill. Normal sensation noted. Normal strength noted.           Neurological: He is alert and oriented to person, place, and time.   Skin: Skin is warm. Pt is not diaphoretic. No erythema.   Psychiatric:  behavior is normal.   Nursing note and vitals reviewed.              Assessment/Plan:      1. Bartholin cyst  Resolving    Finish bactrim    F/u gyn

## 2023-09-05 ENCOUNTER — OFFICE VISIT (OUTPATIENT)
Dept: URGENT CARE | Facility: PHYSICIAN GROUP | Age: 53
End: 2023-09-05
Payer: MEDICAID

## 2023-09-05 VITALS
BODY MASS INDEX: 44.41 KG/M2 | WEIGHT: 293 LBS | RESPIRATION RATE: 18 BRPM | HEART RATE: 77 BPM | SYSTOLIC BLOOD PRESSURE: 140 MMHG | TEMPERATURE: 98 F | DIASTOLIC BLOOD PRESSURE: 82 MMHG | OXYGEN SATURATION: 96 % | HEIGHT: 68 IN

## 2023-09-05 DIAGNOSIS — H66.001 NON-RECURRENT ACUTE SUPPURATIVE OTITIS MEDIA OF RIGHT EAR WITHOUT SPONTANEOUS RUPTURE OF TYMPANIC MEMBRANE: ICD-10-CM

## 2023-09-05 DIAGNOSIS — U07.1 COVID-19: ICD-10-CM

## 2023-09-05 PROCEDURE — 3077F SYST BP >= 140 MM HG: CPT | Performed by: PHYSICIAN ASSISTANT

## 2023-09-05 PROCEDURE — 99214 OFFICE O/P EST MOD 30 MIN: CPT | Performed by: PHYSICIAN ASSISTANT

## 2023-09-05 PROCEDURE — 1125F AMNT PAIN NOTED PAIN PRSNT: CPT | Performed by: PHYSICIAN ASSISTANT

## 2023-09-05 PROCEDURE — 3079F DIAST BP 80-89 MM HG: CPT | Performed by: PHYSICIAN ASSISTANT

## 2023-09-05 RX ORDER — AMOXICILLIN AND CLAVULANATE POTASSIUM 875; 125 MG/1; MG/1
1 TABLET, FILM COATED ORAL 2 TIMES DAILY
Qty: 14 TABLET | Refills: 0 | Status: SHIPPED | OUTPATIENT
Start: 2023-09-05 | End: 2023-09-12

## 2023-09-05 ASSESSMENT — ENCOUNTER SYMPTOMS
FEVER: 0
SORE THROAT: 1
CHILLS: 0
COUGH: 1
MYALGIAS: 1
HEADACHES: 1

## 2023-09-05 ASSESSMENT — FIBROSIS 4 INDEX: FIB4 SCORE: 0.6

## 2023-09-05 ASSESSMENT — PAIN SCALES - GENERAL: PAINLEVEL: 5=MODERATE PAIN

## 2023-09-05 NOTE — PROGRESS NOTES
Subjective:   Nneka Ruiz is a 53 y.o. female who presents today with   Chief Complaint   Patient presents with    Coronavirus Screening     Tested pos Sunday.     Otalgia     Rt ear with drainage    Body Aches    Headache     Cough  This is a new problem. The problem has been unchanged. The problem occurs every few minutes. Associated symptoms include ear pain, headaches, myalgias and a sore throat. Pertinent negatives include no chest pain, chills or fever. She has tried nothing for the symptoms. The treatment provided no relief.     Ear pain started yesterday and she had some drainage to the right ear this morning.    PMH:  has a past medical history of Anemia, Anxiety, Bowel habit changes, Cancer (HCC) (2009), Cold, Dental disorder, Gynecological disorder, Heart burn, Hiatus hernia syndrome, Hypertension, Pain (2014), and Psychiatric problem.    She has no past medical history of CAD (coronary artery disease), COPD, Liver disease, or Sickle cell disease (HCC).  MEDS:   Current Outpatient Medications:     amoxicillin-clavulanate (AUGMENTIN) 875-125 MG Tab, Take 1 Tablet by mouth 2 times a day for 7 days., Disp: 14 Tablet, Rfl: 0    Nirmatrelvir&Ritonavir 300/100 20 x 150 MG & 10 x 100MG Tablet Therapy Pack, Take 300 mg nirmatrelvir (two 150 mg tablets) with 100 mg ritonavir (one 100 mg tablet) by mouth, with all three tablets taken together twice daily for 5 days., Disp: 30 Each, Rfl: 0    allopurinol (ZYLOPRIM) 100 MG Tab, take 1 tablet (100 mg) by oral route once daily Oral 1, Disp: , Rfl:     ergocalciferol (DRISDOL) 61156 UNIT capsule, 1 Capsule., Disp: , Rfl:     VRAYLAR 4.5 MG Cap, , Disp: , Rfl:     lisinopril-hydrochlorothiazide (PRINZIDE) 20-12.5 MG per tablet, Take  by mouth., Disp: , Rfl:     spironolactone (ALDACTONE) 25 MG Tab, Take  by mouth., Disp: , Rfl:     prazosin (MINIPRESS) 5 MG Cap, , Disp: , Rfl:     naltrexone (DEPADE) 50 MG Tab, TAKE 1 TABLET BY MOUTH EVERY NIGHT AT BEDTIME  "FOR CRAVINGS, Disp: , Rfl:     acyclovir (ZOVIRAX) 800 MG Tab, TAKE 1 TABLET BY MOUTH TWICE DAILY FOR 5 DAYS, Disp: , Rfl:     B Complex Vitamins (VITAMIN-B COMPLEX PO), , Disp: , Rfl:     busPIRone (BUSPAR) 10 MG Tab tablet, Take 10 mg by mouth., Disp: , Rfl:     spironolactone (ALDACTONE) 25 MG Tab, Take  by mouth. (Patient not taking: Reported on 9/5/2023), Disp: , Rfl:     metFORMIN (GLUCOPHAGE) 500 MG Tab, , Disp: , Rfl:     omeprazole (PRILOSEC) 40 MG delayed-release capsule, Take 40 mg by mouth. (Patient not taking: Reported on 9/5/2023), Disp: , Rfl:     topiramate (TOPAMAX) 100 MG Tab, , Disp: , Rfl:     lisinopril-hydrochlorothiazide (PRINZIDE) 20-12.5 MG per tablet, Take 1 Tablet by mouth every day. (Patient not taking: Reported on 9/5/2023), Disp: , Rfl:   ALLERGIES:   Allergies   Allergen Reactions    Aspirin Vomiting    Aspirin Vomiting    Other Misc      Waterproof band aids took off skin and caused infection     Nsaids Unspecified     Pt states not allergic to ALL nsaids, just \"not supposed to take them after gastric sleeve surgery\"     SURGHX:   Past Surgical History:   Procedure Laterality Date    GASTRIC BYPASS LAPAROSCOPIC  3/20/2019    Procedure: GASTRIC BYPASS LAPAROSCOPIC- REVISION OF GASTRIC RESTRICTION WITH SINGLE ANASTAMOSIS DUODENAL- INTESTINAL BYPASS;  Surgeon: Keegan Hernandez M.D.;  Location: [facility];  Service: General    HIATAL HERNIA REPAIR  3/20/2019    Procedure: HIATAL HERNIA REPAIR- POSSIBLE OPEN;  Surgeon: Keegan Hernandez M.D.;  Location: [facility];  Service: General    ORIF, FRACTURE, HUMERUS Left 3/1/2019    Procedure: HUMERUS ORIF;  Surgeon: Bandar Block M.D.;  Location: [facility];  Service: Orthopedics    GASTROSCOPY  5/17/2017    Procedure: GASTROSCOPY W/DILATATION, 30-MM ACHALASIA;  Surgeon: Keegan Hernandez M.D.;  Location: [facility];  Service:     GASTRIC SLEEVE LAPAROSCOPY N/A 2/3/2016    Procedure: GASTRIC SLEEVE " "LAPAROSCOPY AND LIVER BIOPSY;  Surgeon: Keegan Hernandez M.D.;  Location: SURGERY [facility];  Service:     KNEE ARTHROSCOPY  12/10/2014    Performed by Mikel Spencer M.D. at SURGERY [facility]    MENISCECTOMY, KNEE, MEDIAL  12/10/2014    Performed by Mikel Spencer M.D. at SURGERY [facility]    MENISCECTOMY  12/10/2014    Performed by Mikel Spencer M.D. at SURGERY [facility]    LATERAL RELEASE  12/10/2014    Performed by Mikel Spencer M.D. at SURGERY [facility]    TYMPANOPLASTY  11/6/2012    Performed by Jonn Kaufman M.D. at SURGERY SAME DAY [facility]    GYN SURGERY      D&C-IUD    TONSILLECTOMY      TUBAL LIGATION       SOCHX:  reports that she quit smoking about 4 years ago. Her smoking use included cigarettes. She started smoking about 16 years ago. She has a 12.0 pack-year smoking history. She has never used smokeless tobacco. She reports current alcohol use. She reports that she does not use drugs.  FH: Reviewed with patient, not pertinent to this visit.     Review of Systems   Constitutional:  Negative for chills and fever.   HENT:  Positive for ear pain and sore throat.    Respiratory:  Positive for cough.    Cardiovascular:  Negative for chest pain.   Musculoskeletal:  Positive for myalgias.   Neurological:  Positive for headaches.      Objective:   BP (!) 140/82   Pulse 77   Temp 36.7 °C (98 °F) (Temporal)   Resp 18   Ht 1.727 m (5' 8\")   Wt (!) 141 kg (310 lb 6.4 oz)   SpO2 96%   BMI 47.20 kg/m²   Physical Exam  Vitals and nursing note reviewed.   Constitutional:       General: She is not in acute distress.     Appearance: Normal appearance. She is well-developed. She is not ill-appearing or toxic-appearing.   HENT:      Head: Normocephalic and atraumatic.      Right Ear: Hearing and ear canal normal. A middle ear effusion is present. Tympanic membrane is erythematous.      Left Ear: Hearing and ear canal normal.      Ears:      Comments: Tube " in place to right TM. Left ear with previous surgical scars and healing.  Cardiovascular:      Rate and Rhythm: Normal rate and regular rhythm.      Heart sounds: Normal heart sounds.   Pulmonary:      Effort: Pulmonary effort is normal.      Breath sounds: Normal breath sounds.   Musculoskeletal:      Comments: Normal movement in all 4 extremities   Skin:     General: Skin is warm and dry.   Neurological:      Mental Status: She is alert.      Coordination: Coordination normal.   Psychiatric:         Mood and Affect: Mood normal.       Assessment/Plan:   Assessment    1. COVID-19  - Nirmatrelvir&Ritonavir 300/100 20 x 150 MG & 10 x 100MG Tablet Therapy Pack; Take 300 mg nirmatrelvir (two 150 mg tablets) with 100 mg ritonavir (one 100 mg tablet) by mouth, with all three tablets taken together twice daily for 5 days.  Dispense: 30 Each; Refill: 0    2. Non-recurrent acute suppurative otitis media of right ear without spontaneous rupture of tympanic membrane  - amoxicillin-clavulanate (AUGMENTIN) 875-125 MG Tab; Take 1 Tablet by mouth 2 times a day for 7 days.  Dispense: 14 Tablet; Refill: 0    Patient is still within the first 5 days of symptoms of COVID and is interested in antiviral therapy at this time.  Patient also has signs of right-sided ear infection which we will treat accordingly with oral antibiotics as well.  Patient is understanding of possible side effects of antiviral and will discontinue if any issues are noted.  Most recent GFR from 5 months ago was greater than 60.      Differential diagnosis, natural history, supportive care, and indications for immediate follow-up discussed.   Patient given instructions and understanding of medications and treatment.    If not improving in 3-5 days, F/U with PCP or return to  if symptoms worsen.    Patient agreeable to plan.    Please note that this dictation was created using voice recognition software. I have made every reasonable attempt to correct obvious  errors, but I expect that there are errors of grammar and possibly content that I did not discover before finalizing the note.    Chaz Mckeon PA-C

## 2023-09-22 ENCOUNTER — OFFICE VISIT (OUTPATIENT)
Dept: URGENT CARE | Facility: PHYSICIAN GROUP | Age: 53
End: 2023-09-22
Payer: MEDICAID

## 2023-09-22 VITALS
RESPIRATION RATE: 18 BRPM | HEIGHT: 68 IN | HEART RATE: 87 BPM | OXYGEN SATURATION: 97 % | TEMPERATURE: 97.3 F | WEIGHT: 293 LBS | DIASTOLIC BLOOD PRESSURE: 96 MMHG | SYSTOLIC BLOOD PRESSURE: 152 MMHG | BODY MASS INDEX: 44.41 KG/M2

## 2023-09-22 DIAGNOSIS — N83.201 RIGHT OVARIAN CYST: ICD-10-CM

## 2023-09-22 DIAGNOSIS — H66.92 ACUTE LEFT OTITIS MEDIA: ICD-10-CM

## 2023-09-22 PROCEDURE — 3077F SYST BP >= 140 MM HG: CPT | Performed by: FAMILY MEDICINE

## 2023-09-22 PROCEDURE — 1125F AMNT PAIN NOTED PAIN PRSNT: CPT | Performed by: FAMILY MEDICINE

## 2023-09-22 PROCEDURE — 99214 OFFICE O/P EST MOD 30 MIN: CPT | Performed by: FAMILY MEDICINE

## 2023-09-22 PROCEDURE — 3080F DIAST BP >= 90 MM HG: CPT | Performed by: FAMILY MEDICINE

## 2023-09-22 RX ORDER — AMOXICILLIN AND CLAVULANATE POTASSIUM 875; 125 MG/1; MG/1
TABLET, FILM COATED ORAL
Qty: 14 TABLET | Refills: 0 | Status: SHIPPED | OUTPATIENT
Start: 2023-09-22 | End: 2023-09-29

## 2023-09-22 RX ORDER — KETOROLAC TROMETHAMINE 30 MG/ML
45 INJECTION, SOLUTION INTRAMUSCULAR; INTRAVENOUS ONCE
Status: COMPLETED | OUTPATIENT
Start: 2023-09-22 | End: 2023-09-22

## 2023-09-22 RX ORDER — KETOROLAC TROMETHAMINE 30 MG/ML
45 INJECTION, SOLUTION INTRAMUSCULAR; INTRAVENOUS ONCE
Status: DISCONTINUED | OUTPATIENT
Start: 2023-09-22 | End: 2023-09-22

## 2023-09-22 RX ADMIN — KETOROLAC TROMETHAMINE 45 MG: 30 INJECTION, SOLUTION INTRAMUSCULAR; INTRAVENOUS at 11:40

## 2023-09-22 ASSESSMENT — FIBROSIS 4 INDEX: FIB4 SCORE: 0.6

## 2023-09-22 ASSESSMENT — PAIN SCALES - GENERAL: PAINLEVEL: 10=SEVERE PAIN

## 2023-09-22 NOTE — PROGRESS NOTES
Chief Complaint:    Chief Complaint   Patient presents with    Otalgia     Left ear pain, leaking yellow fluid. X 1 day        History of Present Illness:    Left ear pain and drainage started today. Saw other provider on 9/5/23, was rx'd Paxlovid for Covid treatment and Augmentin 875 mg BID x 7 days for right OM, visit reviewed. Right ear got better with Augmentin and she tolerated med.    She is also having pain from right ovarian cyst, ongoing since last month - she had D&C on 8/30/23, was rx'd Toradol x 3 days, and will see GYN for follow-up on 9/25/23, but is interested in getting Toradol IM today for pain relief.      Past Medical History:    Past Medical History:   Diagnosis Date    Anemia     during pregnancy    Anxiety     Bowel habit changes     constipation    Cancer (HCC) 2009    uterine    Cold     Dental disorder     lower partial    Gynecological disorder     menorrhagia    Heart burn     Hiatus hernia syndrome     Hypertension     Pain 2014    left knee    Psychiatric problem     anxiety/depression     Past Surgical History:    Past Surgical History:   Procedure Laterality Date    GASTRIC BYPASS LAPAROSCOPIC  3/20/2019    Procedure: GASTRIC BYPASS LAPAROSCOPIC- REVISION OF GASTRIC RESTRICTION WITH SINGLE ANASTAMOSIS DUODENAL- INTESTINAL BYPASS;  Surgeon: Keegan Hernandez M.D.;  Location: [facility];  Service: General    HIATAL HERNIA REPAIR  3/20/2019    Procedure: HIATAL HERNIA REPAIR- POSSIBLE OPEN;  Surgeon: Keegan Hernandez M.D.;  Location: [facility];  Service: General    ORIF, FRACTURE, HUMERUS Left 3/1/2019    Procedure: HUMERUS ORIF;  Surgeon: Bandar Block M.D.;  Location: [facility];  Service: Orthopedics    GASTROSCOPY  5/17/2017    Procedure: GASTROSCOPY W/DILATATION, 30-MM ACHALASIA;  Surgeon: Keegan Hernandez M.D.;  Location: [facility];  Service:     GASTRIC SLEEVE LAPAROSCOPY N/A 2/3/2016    Procedure: GASTRIC SLEEVE LAPAROSCOPY AND LIVER  BIOPSY;  Surgeon: Keegan Hernandez M.D.;  Location: SURGERY [facility];  Service:     KNEE ARTHROSCOPY  12/10/2014    Performed by Mikel Spencer M.D. at SURGERY [facility] ORS    MENISCECTOMY, KNEE, MEDIAL  12/10/2014    Performed by Mikel Spencer M.D. at SURGERY [facility] ORS    MENISCECTOMY  12/10/2014    Performed by Mikel Spencer M.D. at SURGERY [facility] ORS    LATERAL RELEASE  12/10/2014    Performed by Mikel Spencer M.D. at SURGERY [facility]    TYMPANOPLASTY  2012    Performed by Jonn Kaufman M.D. at SURGERY SAME DAY [facility] ORS    GYN SURGERY      D&C-IUD    TONSILLECTOMY      TUBAL LIGATION       Social History:    Social History     Socioeconomic History    Marital status:      Spouse name: Not on file    Number of children: Not on file    Years of education: Not on file    Highest education level: Not on file   Occupational History    Not on file   Tobacco Use    Smoking status: Former     Current packs/day: 0.00     Average packs/day: 1 pack/day for 12.0 years (12.0 ttl pk-yrs)     Types: Cigarettes     Start date: 2007     Quit date: 2019     Years since quittin.6    Smokeless tobacco: Never    Tobacco comments:     2 yrs  / ppd   Vaping Use    Vaping Use: Never used   Substance and Sexual Activity    Alcohol use: Yes     Comment: 1 pint of whiskey nightly     Drug use: No     Comment: meth  none since     Sexual activity: Yes     Partners: Male   Other Topics Concern    Not on file   Social History Narrative    Not on file     Social Determinants of Health     Financial Resource Strain: Not on file   Food Insecurity: Not on file   Transportation Needs: Not on file   Physical Activity: Not on file   Stress: Not on file   Social Connections: Not on file   Intimate Partner Violence: Not on file   Housing Stability: Not on file     Family History:    Family History   Problem Relation Age of Onset    Breast Cancer Father   "    Medications:    Current Outpatient Medications on File Prior to Visit   Medication Sig Dispense Refill    allopurinol (ZYLOPRIM) 100 MG Tab take 1 tablet (100 mg) by oral route once daily Oral 1      ergocalciferol (DRISDOL) 92746 UNIT capsule 1 Capsule.      VRAYLAR 4.5 MG Cap       lisinopril-hydrochlorothiazide (PRINZIDE) 20-12.5 MG per tablet Take  by mouth.      spironolactone (ALDACTONE) 25 MG Tab Take  by mouth.      prazosin (MINIPRESS) 5 MG Cap       acyclovir (ZOVIRAX) 800 MG Tab TAKE 1 TABLET BY MOUTH TWICE DAILY FOR 5 DAYS      B Complex Vitamins (VITAMIN-B COMPLEX PO)       busPIRone (BUSPAR) 10 MG Tab tablet Take 10 mg by mouth.       No current facility-administered medications on file prior to visit.     Allergies:    Allergies   Allergen Reactions    Aspirin Vomiting    Aspirin Vomiting    Other Misc      Waterproof band aids took off skin and caused infection     Nsaids Unspecified     Pt states not allergic to ALL nsaids, just \"not supposed to take them after gastric sleeve surgery\"       Vitals:    Vitals:    09/22/23 1047   BP: (!) 152/96   Pulse: 87   Resp: 18   Temp: 36.3 °C (97.3 °F)   TempSrc: Temporal   SpO2: 97%   Weight: (!) 142 kg (313 lb)   Height: 1.727 m (5' 8\")       Physical Exam:    Constitutional: Vital signs reviewed. Appears well-developed and well-nourished. In pain from right ovarian cyst, has been ongoing.  Eyes: Sclera white, conjunctivae clear.   ENT: External ears normal. External auditory canals normal without discharge. Right TM translucent and non-bulging with blue PE tube. Left TM: moderately erythematous, with perforation - has had perforation at least since 12/8/21 visit. Hearing normal.  Neck: Neck supple.   Pulmonary/Chest: Respirations non-labored.   Musculoskeletal: Normal gait. No muscular atrophy or weakness.  Neurological: Alert and oriented to person, place, and time. Muscle tone normal. Coordination normal.   Skin: No rashes or lesions. Warm, dry, normal " turgor.  Psychiatric: Normal mood and affect. Behavior is normal. Judgment and thought content normal.       Assessment / Plan & Medical Decision Makin. Acute left otitis media  - amoxicillin-clavulanate (AUGMENTIN) 875-125 MG Tab; 1 TAB BY MOUTH TWICE A DAY X 7 DAYS. TAKE WITH FOOD.  Dispense: 14 Tablet; Refill: 0    2. Right ovarian cyst  - ketorolac (Toradol) injection 45 mg       Discussed with her DDX, management options, and risks, benefits, and alternatives to treatment plan agreed upon.    Left ear pain and drainage started today. Saw other provider on 23, was rx'd Paxlovid for Covid treatment and Augmentin 875 mg BID x 7 days for right OM, visit reviewed. Right ear got better with Augmentin and she tolerated med.    She is also having pain from right ovarian cyst, ongoing since last month - she had D&C on 23, was rx'd Toradol x 3 days, and will see GYN for follow-up on 23, but is interested in getting Toradol IM today for pain relief.    In pain from right ovarian cyst, has been ongoing. Left TM: moderately erythematous, with perforation - has had perforation at least since 21 visit.     Right ovarian cyst - chronic condition with acute exacerbation.     Agreeable to medications given and prescribed.    She will see GYN as scheduled on 23.    Discussed expected course of duration, time for improvement, and to seek follow-up in Emergency Room, urgent care, or with PCP if getting worse at any time or not improving within expected time frame.

## 2023-09-24 ENCOUNTER — OFFICE VISIT (OUTPATIENT)
Dept: URGENT CARE | Facility: PHYSICIAN GROUP | Age: 53
End: 2023-09-24
Payer: MEDICAID

## 2023-09-24 VITALS
HEART RATE: 71 BPM | DIASTOLIC BLOOD PRESSURE: 92 MMHG | WEIGHT: 293 LBS | SYSTOLIC BLOOD PRESSURE: 142 MMHG | BODY MASS INDEX: 44.41 KG/M2 | OXYGEN SATURATION: 98 % | TEMPERATURE: 97.5 F | HEIGHT: 68 IN | RESPIRATION RATE: 18 BRPM

## 2023-09-24 DIAGNOSIS — R10.2 PELVIC PAIN: ICD-10-CM

## 2023-09-24 DIAGNOSIS — R03.0 ELEVATED BLOOD PRESSURE READING: ICD-10-CM

## 2023-09-24 LAB
APPEARANCE UR: NORMAL
BILIRUB UR STRIP-MCNC: NORMAL MG/DL
COLOR UR AUTO: YELLOW
GLUCOSE UR STRIP.AUTO-MCNC: NORMAL MG/DL
KETONES UR STRIP.AUTO-MCNC: NORMAL MG/DL
LEUKOCYTE ESTERASE UR QL STRIP.AUTO: NORMAL
NITRITE UR QL STRIP.AUTO: NORMAL
PH UR STRIP.AUTO: 6.5 [PH] (ref 5–8)
PROT UR QL STRIP: NORMAL MG/DL
RBC UR QL AUTO: NORMAL
SP GR UR STRIP.AUTO: 1.03
UROBILINOGEN UR STRIP-MCNC: 0.2 MG/DL

## 2023-09-24 PROCEDURE — 99214 OFFICE O/P EST MOD 30 MIN: CPT | Mod: 25 | Performed by: FAMILY MEDICINE

## 2023-09-24 PROCEDURE — 81002 URINALYSIS NONAUTO W/O SCOPE: CPT | Performed by: FAMILY MEDICINE

## 2023-09-24 PROCEDURE — 3077F SYST BP >= 140 MM HG: CPT | Performed by: FAMILY MEDICINE

## 2023-09-24 PROCEDURE — 3080F DIAST BP >= 90 MM HG: CPT | Performed by: FAMILY MEDICINE

## 2023-09-24 PROCEDURE — 1125F AMNT PAIN NOTED PAIN PRSNT: CPT | Performed by: FAMILY MEDICINE

## 2023-09-24 RX ORDER — KETOROLAC TROMETHAMINE 30 MG/ML
30 INJECTION, SOLUTION INTRAMUSCULAR; INTRAVENOUS ONCE
Status: COMPLETED | OUTPATIENT
Start: 2023-09-24 | End: 2023-09-24

## 2023-09-24 RX ADMIN — KETOROLAC TROMETHAMINE 30 MG: 30 INJECTION, SOLUTION INTRAMUSCULAR; INTRAVENOUS at 10:15

## 2023-09-24 ASSESSMENT — PAIN SCALES - GENERAL: PAINLEVEL: 10=SEVERE PAIN

## 2023-09-24 ASSESSMENT — FIBROSIS 4 INDEX: FIB4 SCORE: 0.6

## 2023-09-24 NOTE — PROGRESS NOTES
Subjective:     Chief Complaint   Patient presents with    Ovarian Cyst     Pain on rt side for 5 days and increasing in pain. See's OB tomorrow.           Subjective:     Chief Complaint   Patient presents with    Ovarian Cyst     Pain on rt side for 5 days and increasing in pain. See's OB tomorrow.                Pt has known rt ovarian cyst.    She is schduled to see her OB tomorrow     pt complains of intermittent, crampy pelvic pain. This is a new problem. Episode onset: 5 d.         The problem has been gradually worse. Associated symptoms include abdominal pain. Pertinent negatives include no chills, congestion, coughing, headaches, nausea or vomiting. Nothing aggravates the symptoms. She has tried nothing for the symptoms.     Past Medical History:   Diagnosis Date    Pain     left knee    Cancer (HCC) 2009    uterine    Anemia     during pregnancy    Anxiety     Bowel habit changes     constipation    Cold     Dental disorder     lower partial    Gynecological disorder     menorrhagia    Heart burn     Hiatus hernia syndrome     Hypertension     Psychiatric problem     anxiety/depression         Social History     Tobacco Use    Smoking status: Former     Current packs/day: 0.00     Average packs/day: 1 pack/day for 12.0 years (12.0 ttl pk-yrs)     Types: Cigarettes     Start date: 2007     Quit date: 2019     Years since quittin.6    Smokeless tobacco: Never    Tobacco comments:     2 yrs  1/2 ppd   Vaping Use    Vaping Use: Never used   Substance Use Topics    Alcohol use: Yes     Comment: 1 pint of whiskey nightly     Drug use: No     Comment: meth  none since            Review of Systems   Constitutional: Negative for chills.   HENT: Negative for congestion.    Respiratory: Negative for cough.    Gastrointestinal: Positive for abdominal pain. Negative for nausea and vomiting.   Genitourinary: Positive for pelvic pain.   Neurological: Negative for headaches.   All other systems  "reviewed and are negative.         Objective:     BP (!) 142/92   Pulse 71   Temp 36.4 °C (97.5 °F) (Temporal)   Resp 18   Ht 1.727 m (5' 8\")   Wt (!) 143 kg (315 lb)   SpO2 98%       Physical Exam   Constitutional: She is oriented to person, place, and time. She appears well-developed and well-nourished. No distress.   HENT:   Head: Normocephalic and atraumatic.   Eyes: Conjunctivae are normal.   Cardiovascular: Normal rate, regular rhythm and normal heart sounds.    No murmur heard.  Pulmonary/Chest: Effort normal and breath sounds normal. No respiratory distress. She has no wheezes.   Abdominal: Soft. Bowel sounds are normal. She exhibits no distension.  + TTP over RLQ  Genitourinary: Pelvic exam was deferred.     Neurological: She is alert and oriented to person, place, and time. No cranial nerve deficit.   Skin: Skin is warm. She is not diaphoretic. No erythema.   Nursing note and vitals reviewed.              Lab Results   Component Value Date/Time    POCCOLOR Yellow 09/24/2023 10:14 AM    POCAPPEAR Slightly cloudy 09/24/2023 10:14 AM    POCLEUKEST Neg 09/24/2023 10:14 AM    POCNITRITE Neg 09/24/2023 10:14 AM    POCUROBILIGE 0.2 09/24/2023 10:14 AM    POCPROTEIN Neg 09/24/2023 10:14 AM    POCURPH 6.5 09/24/2023 10:14 AM    POCBLOOD Neg 09/24/2023 10:14 AM    POCSPGRV 1.030 09/24/2023 10:14 AM    POCKETONES Neg 09/24/2023 10:14 AM    POCBILIRUBIN Neg 09/24/2023 10:14 AM    POCGLUCUA Neg 09/24/2023 10:14 AM           Assessment/Plan:       1. Right pelvic pain    Likely secondary to ovarian cyst    UA unremarkable.     Urine sent for culture.     Advised f/u Gyn in am.           - ketorolac (Toradol) injection 30 mg  - POCT Urinalysis       2.  Elevated blood pressure reading  She does not have hx HTN  Advised f/u PCP         "

## 2023-10-22 ENCOUNTER — APPOINTMENT (OUTPATIENT)
Dept: URGENT CARE | Facility: PHYSICIAN GROUP | Age: 53
End: 2023-10-22
Payer: MEDICAID

## 2023-11-06 ENCOUNTER — APPOINTMENT (OUTPATIENT)
Dept: URGENT CARE | Facility: PHYSICIAN GROUP | Age: 53
End: 2023-11-06
Payer: MEDICAID

## 2023-11-25 ENCOUNTER — APPOINTMENT (OUTPATIENT)
Dept: URGENT CARE | Facility: PHYSICIAN GROUP | Age: 53
End: 2023-11-25
Payer: MEDICAID

## 2023-11-27 ENCOUNTER — OFFICE VISIT (OUTPATIENT)
Dept: URGENT CARE | Facility: PHYSICIAN GROUP | Age: 53
End: 2023-11-27
Payer: MEDICAID

## 2023-11-27 VITALS
WEIGHT: 293 LBS | SYSTOLIC BLOOD PRESSURE: 120 MMHG | HEART RATE: 88 BPM | HEIGHT: 67 IN | RESPIRATION RATE: 18 BRPM | DIASTOLIC BLOOD PRESSURE: 80 MMHG | OXYGEN SATURATION: 100 % | BODY MASS INDEX: 45.99 KG/M2 | TEMPERATURE: 96.9 F

## 2023-11-27 DIAGNOSIS — H60.392 OTHER INFECTIVE ACUTE OTITIS EXTERNA OF LEFT EAR: ICD-10-CM

## 2023-11-27 PROCEDURE — 3074F SYST BP LT 130 MM HG: CPT | Performed by: FAMILY MEDICINE

## 2023-11-27 PROCEDURE — 99213 OFFICE O/P EST LOW 20 MIN: CPT | Performed by: FAMILY MEDICINE

## 2023-11-27 PROCEDURE — 3079F DIAST BP 80-89 MM HG: CPT | Performed by: FAMILY MEDICINE

## 2023-11-27 RX ORDER — AMOXICILLIN AND CLAVULANATE POTASSIUM 875; 125 MG/1; MG/1
1 TABLET, FILM COATED ORAL 2 TIMES DAILY
Qty: 14 TABLET | Refills: 0 | Status: SHIPPED | OUTPATIENT
Start: 2023-11-27 | End: 2023-12-04

## 2023-11-27 ASSESSMENT — FIBROSIS 4 INDEX: FIB4 SCORE: 0.6

## 2023-11-27 NOTE — PROGRESS NOTES
"Subjective:      CC: Otalgia -  ear pain            Otalgia -  left ear  This is a new problem. The current episode started 5 d ago. The problem occurs constantly. The problem has been unchanged. Associated symptoms include: discharge. Pertinent negatives include no abdominal pain, chest pain, chills, fever, headaches, joint swelling, myalgias, nausea, neck pain, rash or visual change. Nothing aggravates the symptoms. She has tried nothing for the symptoms.          Review of Systems   Constitutional: Negative for fever and chills.   HENT:   Negative for hearing loss and tinnitus.    Respiratory: no cough. Negative for hemoptysis, shortness of breath and wheezing.    Cardiovascular: Negative for chest pain, palpitations and leg swelling.   Gastrointestinal: Negative for nausea and abdominal pain.   Musculoskeletal: Negative for myalgias, joint swelling and neck pain.   Skin: Negative for rash.   Neurological: Negative for headaches.   All other systems reviewed and are negative.         Objective:     /80   Pulse 88   Temp 36.1 °C (96.9 °F) (Temporal)   Resp 18   Ht 1.702 m (5' 7\")   Wt (!) 140 kg (308 lb 3.2 oz)   SpO2 100%       Physical Exam   Constitutional: Vital signs are normal. She is active. No distress.   HENT:   Head: There is normal jaw occlusion.   Left  Ear:  There is pain upon palpation of the tragus.  There is erythema, edema, and narrowing of the external auditory canal.   Slight serous discharge noted  Rt  Ear: External ear normal. Tympanic membrane is normal  Nose:   No nasal discharge.   Mouth/Throat: Mucous membranes are moist. No oral lesions.  No erythema. No oropharyngeal exudate, pharynx swelling or pharynx petechiae. No  exudate.   Eyes: Conjunctivae and EOM are normal. Pupils are equal, round, and reactive to light. Right eye exhibits no discharge. Left eye exhibits no discharge.   Neck: Normal range of motion. Neck supple.  No adenopathy  Cardiovascular: Normal rate and " regular rhythm.  Pulses are palpable.    No murmur heard.  Pulmonary/Chest: Effort normal and breath sounds normal. There is normal air entry. No respiratory distress. no wheezes, rhonchi,  retraction.   Musculoskeletal:   no edema.   Neurological: A/O x 3.   CN 2-12 intact   Skin: Skin is warm. Capillary refill takes less than 3 seconds. No purpura and no rash noted. Patient is not diaphoretic. No jaundice or pallor.   Nursing note and vitals reviewed.              Assessment/Plan:       1. Other infective acute otitis externa of left ear     - amoxicillin-clavulanate (AUGMENTIN) 875-125 MG Tab; Take 1 Tablet by mouth 2 times a day for 7 days.  Dispense: 14 Tablet; Refill: 0      Differential diagnosis, natural history, supportive care, and indications for immediate follow-up discussed. All questions answered. Patient agrees with the plan of care.     Follow-up as needed if symptoms worsen or fail to improve to PCP, Urgent care or Emergency Room.     I have personally reviewed prior external notes and test results pertinent to today's visit.  I have independently reviewed and interpreted all diagnostics ordered during this urgent care acute visit.

## 2023-12-03 ENCOUNTER — OFFICE VISIT (OUTPATIENT)
Dept: URGENT CARE | Facility: PHYSICIAN GROUP | Age: 53
End: 2023-12-03
Payer: MEDICAID

## 2023-12-03 VITALS
WEIGHT: 293 LBS | BODY MASS INDEX: 44.41 KG/M2 | SYSTOLIC BLOOD PRESSURE: 142 MMHG | DIASTOLIC BLOOD PRESSURE: 84 MMHG | TEMPERATURE: 98 F | HEIGHT: 68 IN | RESPIRATION RATE: 14 BRPM | HEART RATE: 66 BPM | OXYGEN SATURATION: 99 %

## 2023-12-03 DIAGNOSIS — K04.7 DENTAL INFECTION: ICD-10-CM

## 2023-12-03 DIAGNOSIS — B37.9 ANTIBIOTIC-INDUCED YEAST INFECTION: ICD-10-CM

## 2023-12-03 DIAGNOSIS — H60.392 OTHER INFECTIVE ACUTE OTITIS EXTERNA OF LEFT EAR: ICD-10-CM

## 2023-12-03 DIAGNOSIS — T36.95XA ANTIBIOTIC-INDUCED YEAST INFECTION: ICD-10-CM

## 2023-12-03 PROCEDURE — 3079F DIAST BP 80-89 MM HG: CPT | Performed by: NURSE PRACTITIONER

## 2023-12-03 PROCEDURE — 99214 OFFICE O/P EST MOD 30 MIN: CPT | Performed by: NURSE PRACTITIONER

## 2023-12-03 PROCEDURE — 3077F SYST BP >= 140 MM HG: CPT | Performed by: NURSE PRACTITIONER

## 2023-12-03 RX ORDER — NALTREXONE HYDROCHLORIDE 50 MG/1
TABLET, FILM COATED ORAL
COMMUNITY

## 2023-12-03 RX ORDER — TRAMADOL HYDROCHLORIDE 50 MG/1
1 TABLET ORAL EVERY 6 HOURS PRN
COMMUNITY

## 2023-12-03 RX ORDER — HYDROCODONE BITARTRATE AND ACETAMINOPHEN 5; 325 MG/1; MG/1
TABLET ORAL
COMMUNITY

## 2023-12-03 RX ORDER — INDOMETHACIN 75 MG/1
CAPSULE, EXTENDED RELEASE ORAL
COMMUNITY

## 2023-12-03 RX ORDER — GABAPENTIN 300 MG/1
300 CAPSULE ORAL
COMMUNITY
Start: 2023-11-17 | End: 2024-02-15

## 2023-12-03 RX ORDER — PROGESTERONE 200 MG/1
200 CAPSULE ORAL DAILY
COMMUNITY
Start: 2023-09-26

## 2023-12-03 RX ORDER — CITALOPRAM 40 MG/1
1 TABLET ORAL DAILY
COMMUNITY

## 2023-12-03 RX ORDER — SULFAMETHOXAZOLE AND TRIMETHOPRIM 800; 160 MG/1; MG/1
TABLET ORAL
COMMUNITY
End: 2024-01-13

## 2023-12-03 RX ORDER — KETOROLAC TROMETHAMINE 10 MG/1
TABLET, FILM COATED ORAL
COMMUNITY
Start: 2023-09-28

## 2023-12-03 RX ORDER — LISINOPRIL AND HYDROCHLOROTHIAZIDE 20; 12.5 MG/1; MG/1
TABLET ORAL
COMMUNITY
Start: 2023-11-17 | End: 2024-11-11

## 2023-12-03 RX ORDER — ONDANSETRON 4 MG/1
TABLET, ORALLY DISINTEGRATING ORAL
COMMUNITY

## 2023-12-03 RX ORDER — FLUCONAZOLE 150 MG/1
TABLET ORAL
Qty: 2 TABLET | Refills: 0 | Status: SHIPPED | OUTPATIENT
Start: 2023-12-03

## 2023-12-03 RX ORDER — CLINDAMYCIN HYDROCHLORIDE 300 MG/1
300 CAPSULE ORAL 3 TIMES DAILY
Qty: 21 CAPSULE | Refills: 0 | Status: SHIPPED | OUTPATIENT
Start: 2023-12-03 | End: 2023-12-10

## 2023-12-03 RX ORDER — CHLORHEXIDINE GLUCONATE 213 G/1000ML
SOLUTION TOPICAL
COMMUNITY

## 2023-12-03 RX ORDER — LEVONORGESTREL 52 MG/1
INTRAUTERINE DEVICE INTRAUTERINE
COMMUNITY

## 2023-12-03 RX ORDER — KETOROLAC TROMETHAMINE 30 MG/ML
30 INJECTION, SOLUTION INTRAMUSCULAR; INTRAVENOUS ONCE
Status: COMPLETED | OUTPATIENT
Start: 2023-12-03 | End: 2023-12-03

## 2023-12-03 RX ORDER — DOXYCYCLINE HYCLATE 100 MG/1
CAPSULE ORAL
COMMUNITY
Start: 2023-09-25 | End: 2024-01-13

## 2023-12-03 RX ORDER — OMEPRAZOLE 40 MG/1
40 CAPSULE, DELAYED RELEASE ORAL
COMMUNITY
Start: 2023-11-17 | End: 2024-11-11

## 2023-12-03 RX ORDER — SPIRONOLACTONE 25 MG/1
TABLET ORAL
COMMUNITY
Start: 2023-11-17 | End: 2024-11-11

## 2023-12-03 RX ADMIN — KETOROLAC TROMETHAMINE 30 MG: 30 INJECTION, SOLUTION INTRAMUSCULAR; INTRAVENOUS at 13:12

## 2023-12-03 ASSESSMENT — FIBROSIS 4 INDEX: FIB4 SCORE: 0.6

## 2023-12-03 NOTE — PROGRESS NOTES
Date: 12/03/23     Chief Complaint:    Chief Complaint   Patient presents with    Otalgia        History of Present Illness: 53 y.o.  female presents to clinic with left ear pain and dental pain.  Patient is currently on day 7 of Augmentin for left otitis media.  Patient does have recurrent chronic ear infections.  Patient states she did have a little bit of drainage out of the left ear this morning although the Augmentin has significantly improved her ear pain.  She states she did have dental work done approximately 3 weeks ago.  She states that she is having left upper dental pain and swelling with pain extending into her sinuses.  She denies any fevers, shortness of breath chest pain or leg swelling.  No nausea vomiting diarrhea.  She does have a dentist.      ROS:    As stated in HPI     Medical/SX/ Social History:  Reviewed per chart    Pertinent Medications:    Current Outpatient Medications on File Prior to Visit   Medication Sig Dispense Refill    citalopram (CELEXA) 40 MG Tab Take 1 Tablet by mouth every day.      gabapentin (NEURONTIN) 300 MG Cap Take 300 mg by mouth.      lisinopril-hydrochlorothiazide (PRINZIDE) 20-12.5 MG per tablet Take  by mouth.      naltrexone (DEPADE) 50 MG Tab TAKE 1 TABLET BY MOUTH EVERY NIGHT AT BEDTIME FOR CRAVINGS      omeprazole (PRILOSEC) 40 MG delayed-release capsule Take 40 mg by mouth.      progesterone (PROMETRIUM) 200 MG capsule Take 200 mg by mouth every day.      spironolactone (ALDACTONE) 25 MG Tab Take  by mouth.      amoxicillin-clavulanate (AUGMENTIN) 875-125 MG Tab Take 1 Tablet by mouth 2 times a day for 7 days. 14 Tablet 0    allopurinol (ZYLOPRIM) 100 MG Tab take 1 tablet (100 mg) by oral route once daily Oral 1      ergocalciferol (DRISDOL) 16871 UNIT capsule 1 Capsule.      VRAYLAR 4.5 MG Cap       prazosin (MINIPRESS) 5 MG Cap       B Complex Vitamins (VITAMIN-B COMPLEX PO)       busPIRone (BUSPAR) 10 MG Tab tablet Take 10 mg by mouth.      chlorhexidine  (HIBICLENS) 4 % liquid APPLY EXTERNALLY UTD (Patient not taking: Reported on 12/3/2023)      doxycycline (VIBRAMYCIN) 100 MG Cap TAKE 1 CAPSULE BY MOUTH TWICE DAILY FOR 10 DAYS (Patient not taking: Reported on 12/3/2023)      indomethacin SR (INDOCIN SR) 75 MG Cap CR TK 1 C PO BID WF OR MILK (Patient not taking: Reported on 12/3/2023)      HYDROcodone-acetaminophen (NORCO) 5-325 MG Tab per tablet TK 1 T PO Q 8 TO 12 H PRN P (Patient not taking: Reported on 12/3/2023)      ketorolac (TORADOL) 10 MG Tab TAKE 1 TABLET BY MOUTH FOUR TIMES DAILY FOR 5 DAYS AS NEEDED FOR PAIN. NOT TO EXCEED 40 MG DAILY AND 5 DAYS DURATION FOR ALL DOSE FORMS (Patient not taking: Reported on 12/3/2023)      levonorgestrel (MIRENA, 52 MG,) 20 MCG/DAY IUD  (Patient not taking: Reported on 12/3/2023)      ondansetron (ZOFRAN ODT) 4 MG TABLET DISPERSIBLE  (Patient not taking: Reported on 12/3/2023)      sulfamethoxazole-trimethoprim (BACTRIM DS) 800-160 MG tablet TAKE 1 TABLET BY MOUTH TWICE DAILY FOR 5 DAYS (Patient not taking: Reported on 12/3/2023)      traMADol (ULTRAM) 50 MG Tab Take 1 Tablet by mouth every 6 hours as needed. (Patient not taking: Reported on 12/3/2023)      acyclovir (ZOVIRAX) 800 MG Tab TAKE 1 TABLET BY MOUTH TWICE DAILY FOR 5 DAYS (Patient not taking: Reported on 12/3/2023)       No current facility-administered medications on file prior to visit.        Allergies:    Aspirin, Aspirin, Other misc, and Nsaids     Problem list, medications, and allergies reviewed by myself today in Epic     Physical Exam:    Vitals:    12/03/23 1259   BP: (!) 142/84   Pulse: 66   Resp: 14   Temp: 36.7 °C (98 °F)   SpO2: 99%             Physical Exam  Constitutional:       General: She is not in acute distress.     Appearance: Normal appearance. She is well-developed and normal weight. She is not ill-appearing, toxic-appearing or diaphoretic.   HENT:      Head: Normocephalic and atraumatic.        Right Ear: A PE tube is present. Tympanic  membrane is not erythematous or bulging.      Ears:      Comments: There is very minimal redness to the left TM does appear to be healing.  There is no erythema or swelling to the canal.  No drainage noted.     Nose:      Left Sinus: Maxillary sinus tenderness present.      Mouth/Throat:      Lips: Pink.      Mouth: Mucous membranes are moist.      Dentition: Dental tenderness present. No dental abscesses.      Pharynx: Oropharynx is clear.      Comments: No drainable dental abscess noted.  Left upper dental tenderness  Eyes:      General: Lids are normal. Gaze aligned appropriately. No allergic shiner or scleral icterus.     Extraocular Movements: Extraocular movements intact.      Conjunctiva/sclera: Conjunctivae normal.   Cardiovascular:      Rate and Rhythm: Normal rate and regular rhythm.      Pulses:           Radial pulses are 2+ on the right side and 2+ on the left side.      Heart sounds: Normal heart sounds.   Pulmonary:      Effort: Pulmonary effort is normal.      Breath sounds: Normal breath sounds and air entry. No decreased breath sounds, wheezing, rhonchi or rales.   Abdominal:      General: Abdomen is flat. Bowel sounds are normal.      Palpations: Abdomen is soft.      Tenderness: There is no abdominal tenderness.   Musculoskeletal:      Right lower leg: No edema.      Left lower leg: No edema.   Skin:     General: Skin is warm.      Capillary Refill: Capillary refill takes less than 2 seconds.      Coloration: Skin is not cyanotic or pale.   Neurological:      Mental Status: She is alert and oriented to person, place, and time.      Gait: Gait is intact.   Psychiatric:         Behavior: Behavior normal. Behavior is cooperative.                Medical Decision making and plan :  I personally reviewed prior external notes and test results pertinent to today's visit. Pt is clinically stable at today's acute urgent care visit.  Patient appears nontoxic with no acute distress noted. Appropriate for  outpatient care at this time. The patient remained stable during the urgent care visit.     Pleasant 53 y.o. female presented clinic with continued left ear pain and new onset left upper dental infection.  Using shared decision making she will stop Augmentin and start clindamycin.  Did discuss risks of C. difficile advise probiotics.  Patient does have a history of antibiotic induced vaginal yeast infections did send for Diflucan.  Advised to finish antibiotics in their entirety.  Follow-up with dentist asap/.   Shared decision-making was utilized with patient for treatment plan.  Differential Diagnosis, natural history, and supportive care discussed.        1. Dental infection    - clindamycin (CLEOCIN) 300 MG Cap; Take 1 Capsule by mouth 3 times a day for 7 days.  Dispense: 21 Capsule; Refill: 0  - ketorolac (Toradol) injection 30 mg    2. Other infective acute otitis externa of left ear    - clindamycin (CLEOCIN) 300 MG Cap; Take 1 Capsule by mouth 3 times a day for 7 days.  Dispense: 21 Capsule; Refill: 0       Medication discussed included indication for use and the potential benefits and side effects. Education was provided regarding the aforementioned assessments.  All of the patient's questions were answered to their satisfaction at the time of discharge. Patient was encouraged to monitor symptoms closely. Those signs and symptoms which would warrant concern and mandate seeking a higher level of service through the emergency department discussed at length.  Patient stated agreement and understanding of this plan of care.    Disposition:  Home in stable condition       Voice Recognition Disclaimer:  Portions of this document were created using voice recognition software. The software does have a chance of producing errors of grammar and possibly content. I have made every reasonable attempt to correct obvious errors, but there may be errors of grammar and possibly content that I did not discover before  finalizing the documentation.    TAMIKO Vazquez.

## 2024-01-13 ENCOUNTER — APPOINTMENT (OUTPATIENT)
Dept: URGENT CARE | Facility: PHYSICIAN GROUP | Age: 54
End: 2024-01-13
Payer: MEDICAID

## 2024-01-13 ENCOUNTER — OFFICE VISIT (OUTPATIENT)
Dept: URGENT CARE | Facility: PHYSICIAN GROUP | Age: 54
End: 2024-01-13
Payer: MEDICAID

## 2024-01-13 VITALS
SYSTOLIC BLOOD PRESSURE: 140 MMHG | HEART RATE: 80 BPM | OXYGEN SATURATION: 94 % | DIASTOLIC BLOOD PRESSURE: 82 MMHG | TEMPERATURE: 97.2 F | RESPIRATION RATE: 16 BRPM | BODY MASS INDEX: 44.41 KG/M2 | WEIGHT: 293 LBS | HEIGHT: 68 IN

## 2024-01-13 DIAGNOSIS — H66.001 NON-RECURRENT ACUTE SUPPURATIVE OTITIS MEDIA OF RIGHT EAR WITHOUT SPONTANEOUS RUPTURE OF TYMPANIC MEMBRANE: Primary | ICD-10-CM

## 2024-01-13 DIAGNOSIS — H66.016 RECURRENT ACUTE SUPPURATIVE OTITIS MEDIA WITH SPONTANEOUS RUPTURE OF BOTH TYMPANIC MEMBRANES: ICD-10-CM

## 2024-01-13 PROCEDURE — 3077F SYST BP >= 140 MM HG: CPT | Performed by: NURSE PRACTITIONER

## 2024-01-13 PROCEDURE — 3079F DIAST BP 80-89 MM HG: CPT | Performed by: NURSE PRACTITIONER

## 2024-01-13 PROCEDURE — 99213 OFFICE O/P EST LOW 20 MIN: CPT | Performed by: NURSE PRACTITIONER

## 2024-01-13 RX ORDER — AMOXICILLIN AND CLAVULANATE POTASSIUM 875; 125 MG/1; MG/1
1 TABLET, FILM COATED ORAL 2 TIMES DAILY
Qty: 14 TABLET | Refills: 0 | Status: SHIPPED | OUTPATIENT
Start: 2024-01-13 | End: 2024-01-20

## 2024-01-13 RX ORDER — PREDNISONE 20 MG/1
20 TABLET ORAL DAILY
Qty: 3 TABLET | Refills: 0 | Status: SHIPPED | OUTPATIENT
Start: 2024-01-13 | End: 2024-01-16

## 2024-01-13 ASSESSMENT — FIBROSIS 4 INDEX: FIB4 SCORE: 0.6

## 2024-01-13 NOTE — PROGRESS NOTES
"Subjective:     Nneka Ruiz is a 53 y.o. female who presents for Ear Drainage ((R) x 3 days with pain and sinus congestion.  She has a tube in her R ear. )      Ear Drainage      Pt presents for evaluation of a new problem.  Nneka is a very pleasant 53-year-old female who presents to urgent care today with complaints of drainage from her right ear.  She developed ear pain 3 days ago and today developed bloody drainage.  She does endorse sinus congestion.  She has been using ibuprofen and Tylenol with no relief of her discomfort.  She does suffer from chronic otitis media and does have an tympanostomy tube in place of her right ear.    Review of Systems   HENT:  Positive for ear discharge and ear pain.        PMH:   Past Medical History:   Diagnosis Date    Anemia     during pregnancy    Anxiety     Bowel habit changes     constipation    Cancer (HCC) 2009    uterine    Cold     Dental disorder     lower partial    Gynecological disorder     menorrhagia    Heart burn     Hiatus hernia syndrome     Hypertension     Pain 2014    left knee    Psychiatric problem     anxiety/depression     ALLERGIES:   Allergies   Allergen Reactions    Aspirin Vomiting    Aspirin Vomiting    Other Misc      Waterproof band aids took off skin and caused infection     Nsaids Unspecified     Pt states not allergic to ALL nsaids, just \"not supposed to take them after gastric sleeve surgery\"     SURGHX:   Past Surgical History:   Procedure Laterality Date    GASTRIC BYPASS LAPAROSCOPIC  3/20/2019    Procedure: GASTRIC BYPASS LAPAROSCOPIC- REVISION OF GASTRIC RESTRICTION WITH SINGLE ANASTAMOSIS DUODENAL- INTESTINAL BYPASS;  Surgeon: Keegan Hernandez M.D.;  Location: [facility];  Service: General    HIATAL HERNIA REPAIR  3/20/2019    Procedure: HIATAL HERNIA REPAIR- POSSIBLE OPEN;  Surgeon: Keegan Hernandez M.D.;  Location: SURGERY [facility];  Service: General    ORIF, FRACTURE, HUMERUS Left 3/1/2019    Procedure: " "HUMERUS ORIF;  Surgeon: Bandar Block M.D.;  Location: SURGERY [facility];  Service: Orthopedics    GASTROSCOPY  2017    Procedure: GASTROSCOPY W/DILATATION, 30-MM ACHALASIA;  Surgeon: Keegan Hernandez M.D.;  Location: SURGERY [facility];  Service:     GASTRIC SLEEVE LAPAROSCOPY N/A 2/3/2016    Procedure: GASTRIC SLEEVE LAPAROSCOPY AND LIVER BIOPSY;  Surgeon: Keegan Hernandez M.D.;  Location: SURGERY [facility];  Service:     KNEE ARTHROSCOPY  12/10/2014    Performed by Mikel Spencer M.D. at [facility]    MENISCECTOMY, KNEE, MEDIAL  12/10/2014    Performed by Mikel Spencer M.D. at SURGERY [facility]    MENISCECTOMY  12/10/2014    Performed by Mikel Spencer M.D. at SURGERY [facility]    LATERAL RELEASE  12/10/2014    Performed by Mikel Spencer M.D. at SURGERY [facility]    TYMPANOPLASTY  2012    Performed by Jonn Kaufman M.D. at SURGERY SAME DAY [facility]    GYN SURGERY      D&C-IUD    TONSILLECTOMY      TUBAL LIGATION       SOCHX:   Social History     Socioeconomic History    Marital status:    Tobacco Use    Smoking status: Former     Current packs/day: 0.00     Average packs/day: 1 pack/day for 12.0 years (12.0 ttl pk-yrs)     Types: Cigarettes     Start date: 2007     Quit date: 2019     Years since quittin.9    Smokeless tobacco: Never    Tobacco comments:     2 yrs  1/2 ppd   Vaping Use    Vaping Use: Never used   Substance and Sexual Activity    Alcohol use: Yes     Comment: 1 pint of whiskey nightly     Drug use: No     Comment: meth  none since     Sexual activity: Yes     Partners: Male     FH:   Family History   Problem Relation Age of Onset    Breast Cancer Father          Objective:   BP (!) 140/82   Pulse 80   Temp 36.2 °C (97.2 °F) (Temporal)   Resp 16   Ht 1.727 m (5' 8\")   Wt (!) 140 kg (308 lb)   SpO2 94%   BMI 46.83 kg/m²     Physical Exam  Vitals and nursing note reviewed. "   Constitutional:       General: She is not in acute distress.     Appearance: Normal appearance. She is normal weight. She is not ill-appearing or toxic-appearing.   HENT:      Head: Normocephalic.      Right Ear: External ear normal.      Left Ear: External ear normal.      Ears:      Comments: There is a scratch present to the canal of right ear where bleeding was present.  No active bleeding.  She is suffering from otitis media of right ear.  Tympanic membrane is bulging and erythemic.     Nose: No congestion or rhinorrhea.      Mouth/Throat:      Pharynx: No oropharyngeal exudate or posterior oropharyngeal erythema.   Eyes:      General:         Right eye: No discharge.         Left eye: No discharge.      Pupils: Pupils are equal, round, and reactive to light.   Cardiovascular:      Rate and Rhythm: Normal rate and regular rhythm.   Pulmonary:      Effort: Pulmonary effort is normal.   Abdominal:      General: Abdomen is flat.   Musculoskeletal:         General: Normal range of motion.      Cervical back: Normal range of motion and neck supple.   Skin:     General: Skin is dry.   Neurological:      General: No focal deficit present.      Mental Status: She is alert and oriented to person, place, and time. Mental status is at baseline.   Psychiatric:         Mood and Affect: Mood normal.         Behavior: Behavior normal.         Thought Content: Thought content normal.         Judgment: Judgment normal.         Assessment/Plan:   Assessment    1. Non-recurrent acute suppurative otitis media of right ear without spontaneous rupture of tympanic membrane  predniSONE (DELTASONE) 20 MG Tab    amoxicillin-clavulanate (AUGMENTIN) 875-125 MG Tab      2. Recurrent acute suppurative otitis media with spontaneous rupture of both tympanic membranes  Referral to ENT    CANCELED: Referral to ENT      Patient was started on Augmentin and prednisone for treatment of otitis media.  She is no longer established with ENT.   Referral placed today.  Patient to return for worsening or persistent symptoms.  She is in agreement with plan of care.  AVS handout given and reviewed with patient. Pt educated on red flags and when to seek treatment back in ER or UC.

## 2024-04-09 ENCOUNTER — OFFICE VISIT (OUTPATIENT)
Dept: URGENT CARE | Facility: PHYSICIAN GROUP | Age: 54
End: 2024-04-09
Payer: MEDICAID

## 2024-04-09 VITALS
WEIGHT: 293 LBS | OXYGEN SATURATION: 99 % | RESPIRATION RATE: 20 BRPM | DIASTOLIC BLOOD PRESSURE: 80 MMHG | HEART RATE: 76 BPM | TEMPERATURE: 97.1 F | HEIGHT: 67 IN | SYSTOLIC BLOOD PRESSURE: 138 MMHG | BODY MASS INDEX: 45.99 KG/M2

## 2024-04-09 DIAGNOSIS — H60.502 ACUTE OTITIS EXTERNA OF LEFT EAR, UNSPECIFIED TYPE: ICD-10-CM

## 2024-04-09 PROCEDURE — 99213 OFFICE O/P EST LOW 20 MIN: CPT | Performed by: FAMILY MEDICINE

## 2024-04-09 PROCEDURE — 3079F DIAST BP 80-89 MM HG: CPT | Performed by: FAMILY MEDICINE

## 2024-04-09 PROCEDURE — 3075F SYST BP GE 130 - 139MM HG: CPT | Performed by: FAMILY MEDICINE

## 2024-04-09 RX ORDER — AMOXICILLIN AND CLAVULANATE POTASSIUM 875; 125 MG/1; MG/1
1 TABLET, FILM COATED ORAL 2 TIMES DAILY
Qty: 20 TABLET | Refills: 0 | Status: SHIPPED | OUTPATIENT
Start: 2024-04-09 | End: 2024-04-19

## 2024-04-09 ASSESSMENT — FIBROSIS 4 INDEX: FIB4 SCORE: 0.6

## 2024-04-09 NOTE — PROGRESS NOTES
"Subjective:      CC: Otalgia -  ear pain            Otalgia -   left ear  This is a new problem. The current episode started yesterday. The problem occurs constantly. The problem has been unchanged. Associated symptoms include: discharge. Pertinent negatives include no abdominal pain, chest pain, chills, fever, headaches, joint swelling, myalgias, nausea, neck pain, rash or visual change. Nothing aggravates the symptoms. She has tried nothing for the symptoms.             Review of Systems   Constitutional: Negative for fever and chills.   HENT:   Negative for hearing loss and tinnitus.    Respiratory: no cough. Negative for hemoptysis, shortness of breath and wheezing.    Cardiovascular: Negative for chest pain, palpitations and leg swelling.   Gastrointestinal: Negative for nausea and abdominal pain.   Musculoskeletal: Negative for myalgias, joint swelling and neck pain.   Skin: Negative for rash.   Neurological: Negative for headaches.   All other systems reviewed and are negative.         Objective:     /80 (BP Location: Left arm, Patient Position: Sitting, BP Cuff Size: Adult)   Pulse 76   Temp 36.2 °C (97.1 °F) (Temporal)   Resp 20   Ht 1.702 m (5' 7\")   Wt (!) 138 kg (303 lb 6.4 oz)   SpO2 99%       Physical Exam   Constitutional: Vital signs are normal. She is active. No distress.   HENT:   Head: There is normal jaw occlusion.   Right Ear:  There is pain upon palpation of the tragus.  There is erythema, edema, and narrowing of the external auditory canal.   Slight serous discharge noted  Left Ear: External ear normal. Tympanic membrane is normal  Nose:   No nasal discharge.   Mouth/Throat: Mucous membranes are moist. No oral lesions.  No erythema. No oropharyngeal exudate, pharynx swelling or pharynx petechiae. No  exudate.   Eyes: Conjunctivae and EOM are normal. Pupils are equal, round, and reactive to light. Right eye exhibits no discharge. Left eye exhibits no discharge.   Neck: Normal range " of motion. Neck supple.  No adenopathy  Cardiovascular: Normal rate and regular rhythm.  Pulses are palpable.    No murmur heard.  Pulmonary/Chest: Effort normal and breath sounds normal. There is normal air entry. No respiratory distress. no wheezes, rhonchi,  retraction.   Musculoskeletal:   no edema.   Neurological: A/O x 3.   CN 2-12 intact   Skin: Skin is warm. Capillary refill takes less than 3 seconds. No purpura and no rash noted. Patient is not diaphoretic. No jaundice or pallor.   Nursing note and vitals reviewed.              Assessment/Plan:       1. Acute otitis externa of left ear, unspecified type     - amoxicillin-clavulanate (AUGMENTIN) 875-125 MG Tab; Take 1 Tablet by mouth 2 times a day for 10 days.  Dispense: 20 Tablet; Refill: 0    Differential diagnosis, natural history, supportive care, and indications for immediate follow-up discussed. All questions answered. Patient agrees with the plan of care.     Follow-up as needed if symptoms worsen or fail to improve to PCP, Urgent care or Emergency Room.     I have personally reviewed prior external notes and test results pertinent to today's visit.  I have independently reviewed and interpreted all diagnostics ordered during this urgent care acute visit.

## 2024-05-15 ENCOUNTER — OFFICE VISIT (OUTPATIENT)
Dept: URGENT CARE | Facility: PHYSICIAN GROUP | Age: 54
End: 2024-05-15
Payer: MEDICAID

## 2024-05-15 VITALS
SYSTOLIC BLOOD PRESSURE: 128 MMHG | WEIGHT: 293 LBS | HEART RATE: 68 BPM | OXYGEN SATURATION: 99 % | BODY MASS INDEX: 44.41 KG/M2 | DIASTOLIC BLOOD PRESSURE: 72 MMHG | HEIGHT: 68 IN | RESPIRATION RATE: 16 BRPM | TEMPERATURE: 97.8 F

## 2024-05-15 DIAGNOSIS — H66.001 ACUTE SUPPURATIVE OTITIS MEDIA OF RIGHT EAR WITHOUT SPONTANEOUS RUPTURE OF TYMPANIC MEMBRANE, RECURRENCE NOT SPECIFIED: ICD-10-CM

## 2024-05-15 PROCEDURE — 3078F DIAST BP <80 MM HG: CPT | Performed by: FAMILY MEDICINE

## 2024-05-15 PROCEDURE — 3074F SYST BP LT 130 MM HG: CPT | Performed by: FAMILY MEDICINE

## 2024-05-15 PROCEDURE — 99213 OFFICE O/P EST LOW 20 MIN: CPT | Performed by: FAMILY MEDICINE

## 2024-05-15 RX ORDER — DOXYCYCLINE HYCLATE 100 MG
100 TABLET ORAL 2 TIMES DAILY
Qty: 14 TABLET | Refills: 0 | Status: SHIPPED | OUTPATIENT
Start: 2024-05-15 | End: 2024-05-22

## 2024-05-15 ASSESSMENT — FIBROSIS 4 INDEX: FIB4 SCORE: 0.6

## 2024-05-15 NOTE — PROGRESS NOTES
Subjective:      Chief Complaint   Patient presents with    Ear Drainage     (R) x 2 days    Otalgia               Otalgia - RT  This is a new problem. The current episode started in the past 7 days. The problem occurs constantly. The problem has been unchanged. Associated symptoms include congestion and coughing. Pertinent negatives include no abdominal pain, chest pain, chills, fever, headaches, joint swelling, myalgias, nausea, neck pain, rash or visual change. Nothing aggravates the symptoms. She has tried nothing for the symptoms.     Social History     Tobacco Use    Smoking status: Former     Current packs/day: 0.00     Average packs/day: 1 pack/day for 12.0 years (12.0 ttl pk-yrs)     Types: Cigarettes     Start date: 2007     Quit date: 2019     Years since quittin.2    Smokeless tobacco: Never    Tobacco comments:     2 yrs  1/2 ppd   Vaping Use    Vaping status: Never Used   Substance Use Topics    Alcohol use: Yes     Comment: 1 pint of whiskey nightly     Drug use: No     Comment: meth  none since          Current Outpatient Medications on File Prior to Visit   Medication Sig Dispense Refill    lisinopril-hydrochlorothiazide (PRINZIDE) 20-12.5 MG per tablet Take  by mouth.      naltrexone (DEPADE) 50 MG Tab TAKE 1 TABLET BY MOUTH EVERY NIGHT AT BEDTIME FOR CRAVINGS      omeprazole (PRILOSEC) 40 MG delayed-release capsule Take 40 mg by mouth.      progesterone (PROMETRIUM) 200 MG capsule Take 200 mg by mouth every day.      spironolactone (ALDACTONE) 25 MG Tab Take  by mouth.      allopurinol (ZYLOPRIM) 100 MG Tab take 1 tablet (100 mg) by oral route once daily Oral 1      ergocalciferol (DRISDOL) 26039 UNIT capsule 1 Capsule.      VRAYLAR 4.5 MG Cap       prazosin (MINIPRESS) 5 MG Cap       acyclovir (ZOVIRAX) 800 MG Tab       B Complex Vitamins (VITAMIN-B COMPLEX PO)       busPIRone (BUSPAR) 10 MG Tab tablet Take 10 mg by mouth.      ketorolac (TORADOL) 10 MG Tab TAKE 1 TABLET BY  "MOUTH FOUR TIMES DAILY FOR 5 DAYS AS NEEDED FOR PAIN. NOT TO EXCEED 40 MG DAILY AND 5 DAYS DURATION FOR ALL DOSE FORMS (Patient not taking: Reported on 12/3/2023)      traMADol (ULTRAM) 50 MG Tab Take 1 Tablet by mouth every 6 hours as needed. (Patient not taking: Reported on 12/3/2023)      fluconazole (DIFLUCAN) 150 MG tablet Take one tablet orally for yeast infection, if symptoms persist, may repeat treatment after 72 hours. 2 Tablet 0     No current facility-administered medications on file prior to visit.         Past Medical History:   Diagnosis Date    Pain 2014    left knee    Cancer (HCC) 2009    uterine    Anemia     during pregnancy    Anxiety     Bowel habit changes     constipation    Cold     Dental disorder     lower partial    Gynecological disorder     menorrhagia    Heart burn     Hiatus hernia syndrome     Hypertension     Psychiatric problem     anxiety/depression         Family History   Problem Relation Age of Onset    Breast Cancer Father           Review of Systems   Constitutional: +fatigue  HENT: Positive for congestion and ear pain. Negative for hearing loss and tinnitus.    Respiratory:   Negative for hemoptysis, shortness of breath and wheezing.    Cardiovascular: Negative for chest pain, palpitations and leg swelling.   Gastrointestinal: Negative for nausea and abdominal pain.   Musculoskeletal: Negative for myalgias, joint swelling and neck pain.   Skin: Negative for rash.   Neurological: Negative for headaches.   All other systems reviewed and are negative.         Objective:     /72   Pulse 68   Temp 36.6 °C (97.8 °F) (Temporal)   Resp 16   Ht 1.727 m (5' 8\")   Wt (!) 139 kg (306 lb)   SpO2 99%     Physical Exam   Constitutional: Vital signs are normal.  No distress.   HENT:   Head: There is normal jaw occlusion.   Right Ear: External ear normal. Tympanic membrane is normal. No middle ear effusion.   Rt  Ear: External ear normal. Tympanic membrane is abnormal - " erythematous and bulging. A middle ear effusion is present.  T-TUBE IN PLACE  Nose: Rhinorrhea and congestion present. No nasal discharge.   Mouth/Throat: Mucous membranes are moist. No oral lesions. Pharynx erythema present. No oropharyngeal exudate, pharynx swelling or pharynx petechiae. Tonsils are 0 on the right. Tonsils are 0 on the left. No tonsillar exudate.   Eyes: Conjunctivae and EOM are normal. Pupils are equal, round, and reactive to light. Right eye exhibits no discharge. Left eye exhibits no discharge.   Neck: Normal range of motion. Neck supple.    Cardiovascular: Normal rate and regular rhythm.  Pulses are palpable.    No murmur heard.  Pulmonary/Chest: Effort normal and breath sounds normal. There is normal air entry. No respiratory distress. no wheezes, rhonchi,  retraction.   Musculoskeletal:   no edema.   Neurological: A/O x 3.   CN 2-12 intact   Skin: Skin is warm. Capillary refill takes less than 3 seconds. No purpura and no rash noted. Patient is not diaphoretic. No jaundice or pallor.   Nursing note and vitals reviewed.              Assessment/Plan:     1. Acute suppurative otitis media of right ear without spontaneous rupture of tympanic membrane, recurrence not specified     - doxycycline (VIBRAMYCIN) 100 MG Tab; Take 1 Tablet by mouth 2 times a day for 7 days.  Dispense: 14 Tablet; Refill: 0      Differential diagnosis, natural history, supportive care, and indications for immediate follow-up discussed. All questions answered. Patient agrees with the plan of care.     Follow-up as needed if symptoms worsen or fail to improve to PCP, Urgent care or Emergency Room.     I have personally reviewed prior external notes and test results pertinent to today's visit.  I have independently reviewed and interpreted all diagnostics ordered during this urgent care acute visit.

## 2024-07-25 ENCOUNTER — OFFICE VISIT (OUTPATIENT)
Dept: URGENT CARE | Facility: PHYSICIAN GROUP | Age: 54
End: 2024-07-25
Payer: MEDICAID

## 2024-07-25 VITALS
HEIGHT: 68 IN | WEIGHT: 293 LBS | RESPIRATION RATE: 20 BRPM | DIASTOLIC BLOOD PRESSURE: 76 MMHG | OXYGEN SATURATION: 98 % | TEMPERATURE: 97.2 F | SYSTOLIC BLOOD PRESSURE: 138 MMHG | HEART RATE: 65 BPM | BODY MASS INDEX: 44.41 KG/M2

## 2024-07-25 DIAGNOSIS — H66.91 RIGHT ACUTE OTITIS MEDIA: ICD-10-CM

## 2024-07-25 PROCEDURE — 99213 OFFICE O/P EST LOW 20 MIN: CPT

## 2024-07-25 PROCEDURE — 3078F DIAST BP <80 MM HG: CPT

## 2024-07-25 PROCEDURE — 3075F SYST BP GE 130 - 139MM HG: CPT

## 2024-07-25 RX ORDER — OFLOXACIN 3 MG/ML
10 SOLUTION AURICULAR (OTIC) DAILY
Qty: 14 ML | Refills: 0 | Status: SHIPPED | OUTPATIENT
Start: 2024-07-25 | End: 2024-08-01

## 2024-07-25 ASSESSMENT — FIBROSIS 4 INDEX: FIB4 SCORE: 0.6

## 2024-07-25 ASSESSMENT — ENCOUNTER SYMPTOMS: FEVER: 0

## 2024-07-30 ENCOUNTER — OFFICE VISIT (OUTPATIENT)
Dept: URGENT CARE | Facility: PHYSICIAN GROUP | Age: 54
End: 2024-07-30
Payer: MEDICAID

## 2024-07-30 ENCOUNTER — HOSPITAL ENCOUNTER (OUTPATIENT)
Facility: MEDICAL CENTER | Age: 54
End: 2024-07-30
Attending: FAMILY MEDICINE
Payer: MEDICAID

## 2024-07-30 VITALS
RESPIRATION RATE: 16 BRPM | OXYGEN SATURATION: 97 % | BODY MASS INDEX: 45.99 KG/M2 | SYSTOLIC BLOOD PRESSURE: 120 MMHG | HEIGHT: 67 IN | HEART RATE: 79 BPM | WEIGHT: 293 LBS | DIASTOLIC BLOOD PRESSURE: 86 MMHG | TEMPERATURE: 96.8 F

## 2024-07-30 DIAGNOSIS — Z72.0 TOBACCO USER: ICD-10-CM

## 2024-07-30 DIAGNOSIS — N89.8 VAGINAL DISCHARGE: ICD-10-CM

## 2024-07-30 DIAGNOSIS — H92.01 RIGHT EAR PAIN: ICD-10-CM

## 2024-07-30 LAB
CANDIDA DNA VAG QL PROBE+SIG AMP: NEGATIVE
G VAGINALIS DNA VAG QL PROBE+SIG AMP: NEGATIVE
T VAGINALIS DNA VAG QL PROBE+SIG AMP: NEGATIVE

## 2024-07-30 PROCEDURE — 87510 GARDNER VAG DNA DIR PROBE: CPT

## 2024-07-30 PROCEDURE — 87660 TRICHOMONAS VAGIN DIR PROBE: CPT

## 2024-07-30 PROCEDURE — 87480 CANDIDA DNA DIR PROBE: CPT

## 2024-07-30 ASSESSMENT — FIBROSIS 4 INDEX: FIB4 SCORE: 0.6

## 2025-02-21 ENCOUNTER — OFFICE VISIT (OUTPATIENT)
Dept: URGENT CARE | Facility: PHYSICIAN GROUP | Age: 55
End: 2025-02-21
Payer: MEDICAID

## 2025-02-21 VITALS
BODY MASS INDEX: 44.41 KG/M2 | OXYGEN SATURATION: 96 % | WEIGHT: 293 LBS | RESPIRATION RATE: 20 BRPM | SYSTOLIC BLOOD PRESSURE: 126 MMHG | TEMPERATURE: 97.2 F | HEIGHT: 68 IN | DIASTOLIC BLOOD PRESSURE: 72 MMHG | HEART RATE: 71 BPM

## 2025-02-21 DIAGNOSIS — H66.001 NON-RECURRENT ACUTE SUPPURATIVE OTITIS MEDIA OF RIGHT EAR WITHOUT SPONTANEOUS RUPTURE OF TYMPANIC MEMBRANE: ICD-10-CM

## 2025-02-21 DIAGNOSIS — H66.012 NON-RECURRENT ACUTE SUPPURATIVE OTITIS MEDIA OF LEFT EAR WITH SPONTANEOUS RUPTURE OF TYMPANIC MEMBRANE: ICD-10-CM

## 2025-02-21 PROCEDURE — 3078F DIAST BP <80 MM HG: CPT | Performed by: PHYSICIAN ASSISTANT

## 2025-02-21 PROCEDURE — 3074F SYST BP LT 130 MM HG: CPT | Performed by: PHYSICIAN ASSISTANT

## 2025-02-21 PROCEDURE — 99214 OFFICE O/P EST MOD 30 MIN: CPT | Performed by: PHYSICIAN ASSISTANT

## 2025-02-21 RX ORDER — SUCRALFATE 1 G/1
TABLET ORAL
COMMUNITY

## 2025-02-21 RX ORDER — AMOXICILLIN 875 MG/1
875 TABLET, COATED ORAL 2 TIMES DAILY
Qty: 14 TABLET | Refills: 0 | Status: SHIPPED | OUTPATIENT
Start: 2025-02-21 | End: 2025-02-28

## 2025-02-21 RX ORDER — GABAPENTIN 800 MG/1
800 TABLET ORAL
COMMUNITY
Start: 2024-12-13 | End: 2025-06-11

## 2025-02-21 RX ORDER — OMEPRAZOLE 40 MG/1
40 CAPSULE, DELAYED RELEASE ORAL DAILY
COMMUNITY

## 2025-02-21 RX ORDER — CLONIDINE HYDROCHLORIDE 0.1 MG/1
0.1-0.2 TABLET ORAL
COMMUNITY
Start: 2025-02-10

## 2025-02-21 RX ORDER — BUPROPION HYDROCHLORIDE 200 MG/1
TABLET, EXTENDED RELEASE ORAL
COMMUNITY
Start: 2025-02-03

## 2025-02-21 RX ORDER — LISINOPRIL AND HYDROCHLOROTHIAZIDE 12.5; 2 MG/1; MG/1
1 TABLET ORAL DAILY
COMMUNITY

## 2025-02-21 RX ORDER — SPIRONOLACTONE 25 MG/1
25 TABLET ORAL DAILY
COMMUNITY

## 2025-02-21 RX ORDER — OFLOXACIN 3 MG/ML
10 SOLUTION AURICULAR (OTIC) DAILY
Qty: 14 ML | Refills: 0 | Status: SHIPPED | OUTPATIENT
Start: 2025-02-21 | End: 2025-02-28

## 2025-02-21 ASSESSMENT — ENCOUNTER SYMPTOMS
CHILLS: 0
SORE THROAT: 0
FEVER: 0
COUGH: 0

## 2025-02-21 ASSESSMENT — FIBROSIS 4 INDEX: FIB4 SCORE: 0.61

## 2025-02-21 NOTE — PROGRESS NOTES
Subjective:   Nneka Ruiz is a 54 y.o. female who presents today with   Chief Complaint   Patient presents with    Ear Pain     Bilat ear right since Tuesday left since today pt sts they have been draining      Otalgia   There is pain in both ears. This is a new problem. Episode onset: 3 days. The problem occurs constantly. The problem has been unchanged. There has been no fever. Associated symptoms include ear discharge. Pertinent negatives include no coughing or sore throat. She has tried nothing for the symptoms. The treatment provided no relief.     Patient has extensive history of TM surgeries and tube placement currently to the right ear.  She does follow-up with ENT.  Her next appointment is in April.  No recent injury or trauma.    PMH:  has a past medical history of Anemia, Anxiety, Bowel habit changes, Cancer ([facility]) (2009), Cold, Dental disorder, Gynecological disorder, Heart burn, Hiatus hernia syndrome, Hypertension, Pain (2014), and Psychiatric problem.    She has no past medical history of CAD (coronary artery disease), COPD, Liver disease, or Sickle cell disease ([facility]).  MEDS:   Current Outpatient Medications:     cloNIDine (CATAPRES) 0.1 MG Tab, Take 0.1-0.2 mg by mouth at bedtime as needed., Disp: , Rfl:     gabapentin (NEURONTIN) 800 MG tablet, Take 800 mg by mouth., Disp: , Rfl:     lisinopril-hydrochlorothiazide (PRINZIDE) 20-12.5 MG per tablet, Take 1 Tablet by mouth every day., Disp: , Rfl:     omeprazole (PRILOSEC) 40 MG delayed-release capsule, Take 40 mg by mouth every day., Disp: , Rfl:     spironolactone (ALDACTONE) 25 MG Tab, Take 25 mg by mouth every day., Disp: , Rfl:     sucralfate (CARAFATE) 1 GM Tab, TAKE 1 TABLET BY MOUTH THREE TIMES DAILY USE AS NEEDED WHEN YOU GET ACID REFLUX PAIN, Disp: , Rfl:     buPROPion (WELLBUTRIN SR) 200 MG SR tablet, TAKE 1 TABLET BY MOUTH EVERY DAY FOR APPETITE SUPPRESSION, Disp: , Rfl:     ofloxacin otic sol (FLOXIN OTIC) 0.3 % Solution,  "Administer 10 Drops into affected ear(s) every day for 7 days., Disp: 14 mL, Rfl: 0    amoxicillin (AMOXIL) 875 MG tablet, Take 1 Tablet by mouth 2 times a day for 7 days., Disp: 14 Tablet, Rfl: 0    naltrexone (DEPADE) 50 MG Tab, TAKE 1 TABLET BY MOUTH EVERY NIGHT AT BEDTIME FOR CRAVINGS, Disp: , Rfl:     allopurinol (ZYLOPRIM) 100 MG Tab, take 1 tablet (100 mg) by oral route once daily Oral 1, Disp: , Rfl:     VRAYLAR 4.5 MG Cap, , Disp: , Rfl:     acyclovir (ZOVIRAX) 800 MG Tab, , Disp: , Rfl:     ketorolac (TORADOL) 10 MG Tab, , Disp: , Rfl:     progesterone (PROMETRIUM) 200 MG capsule, Take 200 mg by mouth every day. (Patient not taking: Reported on 2/21/2025), Disp: , Rfl:     prazosin (MINIPRESS) 5 MG Cap, , Disp: , Rfl:     B Complex Vitamins (VITAMIN-B COMPLEX PO), , Disp: , Rfl:     busPIRone (BUSPAR) 10 MG Tab tablet, Take 10 mg by mouth. (Patient not taking: Reported on 2/21/2025), Disp: , Rfl:   ALLERGIES:   Allergies   Allergen Reactions    Aspirin Vomiting    Aspirin Vomiting    Other Misc      Waterproof band aids took off skin and caused infection     Nsaids Unspecified     Pt states not allergic to ALL nsaids, just \"not supposed to take them after gastric sleeve surgery\"     SURGHX:   Past Surgical History:   Procedure Laterality Date    GASTRIC BYPASS LAPAROSCOPIC  3/20/2019    Procedure: GASTRIC BYPASS LAPAROSCOPIC- REVISION OF GASTRIC RESTRICTION WITH SINGLE ANASTAMOSIS DUODENAL- INTESTINAL BYPASS;  Surgeon: Keegan Hernandez M.D.;  Location: [facility];  Service: General    HIATAL HERNIA REPAIR  3/20/2019    Procedure: HIATAL HERNIA REPAIR- POSSIBLE OPEN;  Surgeon: Keegan Hernandez M.D.;  Location: [facility];  Service: General    ORIF, FRACTURE, HUMERUS Left 3/1/2019    Procedure: HUMERUS ORIF;  Surgeon: Bandar Blcok M.D.;  Location: [facility];  Service: Orthopedics    GASTROSCOPY  5/17/2017    Procedure: GASTROSCOPY W/DILATATION, 30-MM ACHALASIA;  Surgeon: " "Keegan Hernandez M.D.;  Location: SURGERY [facility];  Service:     GASTRIC SLEEVE LAPAROSCOPY N/A 2/3/2016    Procedure: GASTRIC SLEEVE LAPAROSCOPY AND LIVER BIOPSY;  Surgeon: Keegan Hernandez M.D.;  Location: SURGERY [facility];  Service:     KNEE ARTHROSCOPY  12/10/2014    Performed by Mikel Spencer M.D. at SURGERY [facility]    MENISCECTOMY, KNEE, MEDIAL  12/10/2014    Performed by Mikel Spencer M.D. at SURGERY [facility]    MENISCECTOMY  12/10/2014    Performed by Mikel Spencer M.D. at SURGERY [facility]    LATERAL RELEASE  12/10/2014    Performed by Mikel Spencer M.D. at SURGERY [facility]    TYMPANOPLASTY  11/6/2012    Performed by Jonn Kaufman M.D. at SURGERY SAME DAY [facility]    GYN SURGERY      D&C-IUD    TONSILLECTOMY      TUBAL LIGATION       SOCHX:  reports that she quit smoking about 6 years ago. Her smoking use included cigarettes. She started smoking about 18 years ago. She has a 12 pack-year smoking history. She has never used smokeless tobacco. She reports current alcohol use. She reports that she does not use drugs.  FH: Reviewed with patient, not pertinent to this visit.       Review of Systems   Constitutional:  Negative for chills and fever.   HENT:  Positive for ear discharge and ear pain. Negative for sore throat.    Respiratory:  Negative for cough.         Objective:   /72   Pulse 71   Temp 36.2 °C (97.2 °F) (Temporal)   Resp 20   Ht 1.727 m (5' 8\")   Wt (!) 141 kg (311 lb)   SpO2 96%   BMI 47.29 kg/m²   Physical Exam  Vitals and nursing note reviewed.   Constitutional:       General: She is not in acute distress.     Appearance: Normal appearance. She is well-developed. She is not ill-appearing or toxic-appearing.   HENT:      Head: Normocephalic and atraumatic.      Right Ear: Hearing normal. Drainage present. No swelling. Tympanic membrane is scarred.      Left Ear: Hearing normal. Drainage and swelling present. A " middle ear effusion is present. Tympanic membrane is injected and scarred.      Ears:        Comments: Right TM tube intact with some minimal drainage.  Slight perforation noted to the left lower aspect of the TM of the ear canal as marked above.  Cardiovascular:      Rate and Rhythm: Normal rate and regular rhythm.      Heart sounds: Normal heart sounds.   Pulmonary:      Effort: Pulmonary effort is normal.      Breath sounds: Normal breath sounds.   Musculoskeletal:      Comments: Normal movement in all 4 extremities   Skin:     General: Skin is warm and dry.   Neurological:      Mental Status: She is alert.      Coordination: Coordination normal.   Psychiatric:         Mood and Affect: Mood normal.           Assessment/Plan:   Assessment    1. Non-recurrent acute suppurative otitis media of left ear with spontaneous rupture of tympanic membrane  - ofloxacin otic sol (FLOXIN OTIC) 0.3 % Solution; Administer 10 Drops into affected ear(s) every day for 7 days.  Dispense: 14 mL; Refill: 0  - amoxicillin (AMOXIL) 875 MG tablet; Take 1 Tablet by mouth 2 times a day for 7 days.  Dispense: 14 Tablet; Refill: 0    2. Non-recurrent acute suppurative otitis media of right ear without spontaneous rupture of tympanic membrane  - ofloxacin otic sol (FLOXIN OTIC) 0.3 % Solution; Administer 10 Drops into affected ear(s) every day for 7 days.  Dispense: 14 mL; Refill: 0  - amoxicillin (AMOXIL) 875 MG tablet; Take 1 Tablet by mouth 2 times a day for 7 days.  Dispense: 14 Tablet; Refill: 0    Other orders  - cloNIDine (CATAPRES) 0.1 MG Tab; Take 0.1-0.2 mg by mouth at bedtime as needed.  - gabapentin (NEURONTIN) 800 MG tablet; Take 800 mg by mouth.  - lisinopril-hydrochlorothiazide (PRINZIDE) 20-12.5 MG per tablet; Take 1 Tablet by mouth every day.  - omeprazole (PRILOSEC) 40 MG delayed-release capsule; Take 40 mg by mouth every day.  - spironolactone (ALDACTONE) 25 MG Tab; Take 25 mg by mouth every day.  - sucralfate (CARAFATE) 1  GM Tab; TAKE 1 TABLET BY MOUTH THREE TIMES DAILY USE AS NEEDED WHEN YOU GET ACID REFLUX PAIN  - buPROPion (WELLBUTRIN SR) 200 MG SR tablet; TAKE 1 TABLET BY MOUTH EVERY DAY FOR APPETITE SUPPRESSION    Would recommend using cotton balls when showering and avoiding any water or any other foreign body into the ear besides the antibiotic drops.  Would recommend trying to follow-up with ENT sooner than April.    Differential diagnosis, natural history, supportive care, and indications for immediate follow-up discussed.   Patient given instructions and understanding of medications and treatment.    If not improving in 3-5 days, F/U with PCP or return to  if symptoms worsen.    Patient agreeable to plan.        Please note that this dictation was created using voice recognition software. I have made every reasonable attempt to correct obvious errors, but I expect that there are errors of grammar and possibly content that I did not discover before finalizing the note.    Chaz Mckeon PA-C

## 2025-03-17 ENCOUNTER — APPOINTMENT (OUTPATIENT)
Dept: URGENT CARE | Facility: PHYSICIAN GROUP | Age: 55
End: 2025-03-17
Payer: MEDICAID

## 2025-03-17 ENCOUNTER — OFFICE VISIT (OUTPATIENT)
Dept: URGENT CARE | Facility: PHYSICIAN GROUP | Age: 55
End: 2025-03-17
Payer: MEDICAID

## 2025-03-17 VITALS
TEMPERATURE: 97.6 F | SYSTOLIC BLOOD PRESSURE: 126 MMHG | WEIGHT: 293 LBS | HEART RATE: 82 BPM | HEIGHT: 68 IN | RESPIRATION RATE: 16 BRPM | DIASTOLIC BLOOD PRESSURE: 78 MMHG | BODY MASS INDEX: 44.41 KG/M2 | OXYGEN SATURATION: 96 %

## 2025-03-17 DIAGNOSIS — H66.001 ACUTE SUPPURATIVE OTITIS MEDIA OF RIGHT EAR WITHOUT SPONTANEOUS RUPTURE OF TYMPANIC MEMBRANE, RECURRENCE NOT SPECIFIED: ICD-10-CM

## 2025-03-17 PROCEDURE — 3078F DIAST BP <80 MM HG: CPT | Performed by: FAMILY MEDICINE

## 2025-03-17 PROCEDURE — 3074F SYST BP LT 130 MM HG: CPT | Performed by: FAMILY MEDICINE

## 2025-03-17 PROCEDURE — 99214 OFFICE O/P EST MOD 30 MIN: CPT | Performed by: FAMILY MEDICINE

## 2025-03-17 RX ORDER — DOXYCYCLINE HYCLATE 100 MG
100 TABLET ORAL 2 TIMES DAILY
Qty: 20 TABLET | Refills: 0 | Status: SHIPPED | OUTPATIENT
Start: 2025-03-17 | End: 2025-03-27

## 2025-03-17 RX ORDER — FLUCONAZOLE 150 MG/1
150 TABLET ORAL
Qty: 1 TABLET | Refills: 0 | Status: SHIPPED | OUTPATIENT
Start: 2025-03-17

## 2025-03-17 ASSESSMENT — FIBROSIS 4 INDEX: FIB4 SCORE: 0.61

## 2025-03-17 NOTE — PROGRESS NOTES
Subjective:      Chief Complaint   Patient presents with    Ear Drainage     Ear Drainage in R Ear, Was here before but medication isn't helping               Otalgia- rt   This is a new problem. The current episode started in the past 7 days. The problem occurs constantly. The problem has been unchanged. Associated symptoms include fever,  congestion and coughing. Pertinent negatives include no abdominal pain, chest pain, chills,  , headaches, joint swelling, myalgias, nausea, neck pain, rash or visual change. Nothing aggravates the symptoms. She has tried nothing for the symptoms.     Social History     Tobacco Use    Smoking status: Former     Current packs/day: 0.00     Average packs/day: 1 pack/day for 12.0 years (12.0 ttl pk-yrs)     Types: Cigarettes     Start date: 2007     Quit date: 2019     Years since quittin.0    Smokeless tobacco: Never    Tobacco comments:     2 yrs   ppd   Vaping Use    Vaping status: Never Used   Substance Use Topics    Alcohol use: Yes     Comment: 1 pint of whiskey nightly     Drug use: No     Comment: meth  none since          Current Outpatient Medications on File Prior to Visit   Medication Sig Dispense Refill    gabapentin (NEURONTIN) 800 MG tablet Take 800 mg by mouth.      lisinopril-hydrochlorothiazide (PRINZIDE) 20-12.5 MG per tablet Take 1 Tablet by mouth every day.      omeprazole (PRILOSEC) 40 MG delayed-release capsule Take 40 mg by mouth every day.      spironolactone (ALDACTONE) 25 MG Tab Take 25 mg by mouth every day.      sucralfate (CARAFATE) 1 GM Tab TAKE 1 TABLET BY MOUTH THREE TIMES DAILY USE AS NEEDED WHEN YOU GET ACID REFLUX PAIN      buPROPion (WELLBUTRIN SR) 200 MG SR tablet TAKE 1 TABLET BY MOUTH EVERY DAY FOR APPETITE SUPPRESSION      naltrexone (DEPADE) 50 MG Tab TAKE 1 TABLET BY MOUTH EVERY NIGHT AT BEDTIME FOR CRAVINGS      allopurinol (ZYLOPRIM) 100 MG Tab take 1 tablet (100 mg) by oral route once daily Oral 1      cloNIDine  "(CATAPRES) 0.1 MG Tab Take 0.1-0.2 mg by mouth at bedtime as needed.      ketorolac (TORADOL) 10 MG Tab  (Patient not taking: Reported on 2/21/2025)      progesterone (PROMETRIUM) 200 MG capsule Take 200 mg by mouth every day. (Patient not taking: Reported on 2/21/2025)      VRAYLAR 4.5 MG Cap       prazosin (MINIPRESS) 5 MG Cap  (Patient not taking: Reported on 2/21/2025)      acyclovir (ZOVIRAX) 800 MG Tab  (Patient not taking: Reported on 3/17/2025)      B Complex Vitamins (VITAMIN-B COMPLEX PO)  (Patient not taking: Reported on 2/21/2025)      busPIRone (BUSPAR) 10 MG Tab tablet Take 10 mg by mouth. (Patient not taking: Reported on 2/21/2025)       No current facility-administered medications on file prior to visit.         Past Medical History:   Diagnosis Date    Pain 2014    left knee    Cancer (HCC) 2009    uterine    Anemia     during pregnancy    Anxiety     Bowel habit changes     constipation    Cold     Dental disorder     lower partial    Gynecological disorder     menorrhagia    Heart burn     Hiatus hernia syndrome     Hypertension     Psychiatric problem     anxiety/depression         Family History   Problem Relation Age of Onset    Breast Cancer Father           Review of Systems   Constitutional: +fever  HENT: Positive for congestion and ear pain. Negative for hearing loss and tinnitus.    Respiratory:   Negative for hemoptysis, shortness of breath and wheezing.    Cardiovascular: Negative for chest pain, palpitations and leg swelling.   Gastrointestinal: Negative for nausea and abdominal pain.   Musculoskeletal: Negative for myalgias, joint swelling and neck pain.   Skin: Negative for rash.   Neurological: Negative for headaches.   All other systems reviewed and are negative.         Objective:     /78   Pulse 82   Temp 36.4 °C (97.6 °F) (Temporal)   Resp 16   Ht 1.727 m (5' 8\")   Wt (!) 149 kg (328 lb)   SpO2 96%     Physical Exam   Constitutional: Vital signs are normal.  No " distress.   HENT:   Head: There is normal jaw occlusion.   Right Ear: External ear normal. Tympanic membrane is normal. No middle ear effusion.   rt Ear: External ear normal. Tympanic membrane is abnormal - erythematous .   T-tube in place .    + discharge  Nose: Rhinorrhea and congestion present. No nasal discharge.   Mouth/Throat: Mucous membranes are moist. No oral lesions. Pharynx erythema present. No oropharyngeal exudate, pharynx swelling or pharynx petechiae.    Eyes: Conjunctivae and EOM are normal. Pupils are equal, round, and reactive to light. Right eye exhibits no discharge. Left eye exhibits no discharge.   Neck: Normal range of motion. Neck supple   Cardiovascular: Normal rate and regular rhythm.  Pulses are palpable.    No murmur heard.  Pulmonary/Chest: Effort normal and breath sounds normal. There is normal air entry. No respiratory distress. no wheezes, rhonchi,  retraction.   Musculoskeletal:   no edema.   Neurological: A/O x 3.   CN 2-12 intact   Skin: Skin is warm. Capillary refill takes less than 3 seconds. No purpura and no rash noted. Patient is not diaphoretic. No jaundice or pallor.   Nursing note and vitals reviewed.              Assessment/Plan:          1. Acute suppurative otitis media of right ear without spontaneous rupture of tympanic membrane, recurrence not specified   She does not want augmentin due to side effects  - doxycycline (VIBRAMYCIN) 100 MG Tab; Take 1 Tablet by mouth 2 times a day for 10 days.  Dispense: 20 Tablet; Refill: 0  - fluconazole (DIFLUCAN) 150 MG tablet; Take 1 Tablet by mouth one time as needed (yeast infection) for up to 1 dose.  Dispense: 1 Tablet; Refill: 0        Differential diagnosis, natural history, supportive care, and indications for immediate follow-up discussed. All questions answered. Patient agrees with the plan of care.     Follow-up as needed if symptoms worsen or fail to improve to PCP, Urgent care or Emergency Room.     I have personally  reviewed prior external notes and test results pertinent to today's visit.  I have independently reviewed and interpreted all diagnostics ordered during this urgent care acute visit.

## 2025-03-31 ENCOUNTER — OFFICE VISIT (OUTPATIENT)
Dept: URGENT CARE | Facility: PHYSICIAN GROUP | Age: 55
End: 2025-03-31
Payer: MEDICAID

## 2025-03-31 VITALS
HEART RATE: 87 BPM | RESPIRATION RATE: 16 BRPM | TEMPERATURE: 97.8 F | OXYGEN SATURATION: 97 % | DIASTOLIC BLOOD PRESSURE: 72 MMHG | BODY MASS INDEX: 45.99 KG/M2 | HEIGHT: 67 IN | WEIGHT: 293 LBS | SYSTOLIC BLOOD PRESSURE: 130 MMHG

## 2025-03-31 DIAGNOSIS — H65.04 RECURRENT ACUTE SEROUS OTITIS MEDIA OF RIGHT EAR: ICD-10-CM

## 2025-03-31 PROCEDURE — 3078F DIAST BP <80 MM HG: CPT | Performed by: NURSE PRACTITIONER

## 2025-03-31 PROCEDURE — 99213 OFFICE O/P EST LOW 20 MIN: CPT | Performed by: NURSE PRACTITIONER

## 2025-03-31 PROCEDURE — 3075F SYST BP GE 130 - 139MM HG: CPT | Performed by: NURSE PRACTITIONER

## 2025-03-31 RX ORDER — CIPROFLOXACIN AND DEXAMETHASONE 3; 1 MG/ML; MG/ML
4 SUSPENSION/ DROPS AURICULAR (OTIC) 2 TIMES DAILY
Qty: 2.8 ML | Refills: 0 | Status: SHIPPED | OUTPATIENT
Start: 2025-03-31 | End: 2025-04-07

## 2025-03-31 RX ORDER — PREDNISONE 20 MG/1
40 TABLET ORAL DAILY
Qty: 10 TABLET | Refills: 0 | Status: SHIPPED | OUTPATIENT
Start: 2025-03-31 | End: 2025-04-02

## 2025-03-31 ASSESSMENT — FIBROSIS 4 INDEX: FIB4 SCORE: 0.61

## 2025-03-31 NOTE — PROGRESS NOTES
"Subjective:     Nneka Ruiz is a 54 y.o. female who presents for Otalgia (Reocc. Ear inf. 3rd round of antibiotics, now moved into sinuses. )      Otalgia         Review of Systems   HENT:  Positive for ear pain.        PMH:   Past Medical History:   Diagnosis Date   • Anemia     during pregnancy   • Anxiety    • Bowel habit changes     constipation   • Cancer (HCC) 2009    uterine   • Cold    • Dental disorder     lower partial   • Gynecological disorder     menorrhagia   • Heart burn    • Hiatus hernia syndrome    • Hypertension    • Pain 2014    left knee   • Psychiatric problem     anxiety/depression     ALLERGIES:   Allergies   Allergen Reactions   • Aspirin Vomiting   • Aspirin Vomiting   • Other Misc      Waterproof band aids took off skin and caused infection    • Nsaids Unspecified     Pt states not allergic to ALL nsaids, just \"not supposed to take them after gastric sleeve surgery\"     SURGHX:   Past Surgical History:   Procedure Laterality Date   • GASTRIC BYPASS LAPAROSCOPIC  3/20/2019    Procedure: GASTRIC BYPASS LAPAROSCOPIC- REVISION OF GASTRIC RESTRICTION WITH SINGLE ANASTAMOSIS DUODENAL- INTESTINAL BYPASS;  Surgeon: Keegan Hernandez M.D.;  Location: [facility];  Service: General   • HIATAL HERNIA REPAIR  3/20/2019    Procedure: HIATAL HERNIA REPAIR- POSSIBLE OPEN;  Surgeon: Keegan Hernandez M.D.;  Location: [facility];  Service: General   • ORIF, FRACTURE, HUMERUS Left 3/1/2019    Procedure: HUMERUS ORIF;  Surgeon: Bandar Block M.D.;  Location: [facility];  Service: Orthopedics   • GASTROSCOPY  5/17/2017    Procedure: GASTROSCOPY W/DILATATION, 30-MM ACHALASIA;  Surgeon: Keegan Hernandez M.D.;  Location: [facility];  Service:    • GASTRIC SLEEVE LAPAROSCOPY N/A 2/3/2016    Procedure: GASTRIC SLEEVE LAPAROSCOPY AND LIVER BIOPSY;  Surgeon: Keegan Hernandez M.D.;  Location: [facility];  Service:    • KNEE ARTHROSCOPY  12/10/2014    " "Performed by Mikel Spencer M.D. at SURGERY [facility]   • MENISCECTOMY, KNEE, MEDIAL  12/10/2014    Performed by Mikel Spencer M.D. at SURGERY [facility]   • MENISCECTOMY  12/10/2014    Performed by Mikel Spencer M.D. at SURGERY [facility]   • LATERAL RELEASE  12/10/2014    Performed by Mikel Spencer M.D. at SURGERY [facility]   • TYMPANOPLASTY  2012    Performed by Jonn Kaufman M.D. at SURGERY SAME DAY [facility]   • GYN SURGERY      D&C-IUD   • TONSILLECTOMY     • TUBAL LIGATION       SOCHX:   Social History     Socioeconomic History   • Marital status:    Tobacco Use   • Smoking status: Former     Current packs/day: 0.00     Average packs/day: 1 pack/day for 12.0 years (12.0 ttl pk-yrs)     Types: Cigarettes     Start date: 2007     Quit date: 2019     Years since quittin.1   • Smokeless tobacco: Never   • Tobacco comments:     2 yrs  1/2 ppd   Vaping Use   • Vaping status: Never Used   Substance and Sexual Activity   • Alcohol use: Yes     Comment: 1 pint of whiskey nightly    • Drug use: No     Comment: meth  none since    • Sexual activity: Yes     Partners: Male     FH:   Family History   Problem Relation Age of Onset   • Breast Cancer Father          Objective:   /72   Pulse 87   Temp 36.6 °C (97.8 °F) (Temporal)   Resp 16   Ht 1.702 m (5' 7\")   Wt (!) 149 kg (328 lb)   SpO2 97%   BMI 51.37 kg/m²     Physical Exam    Assessment/Plan:   Assessment      AVS handout given and reviewed with patient. Pt educated on red flags and when to seek treatment back in ER or UC.     There are no diagnoses linked to this encounter.  " She is not in acute distress.     Appearance: Normal appearance. She is normal weight. She is not ill-appearing or toxic-appearing.   HENT:      Head: Normocephalic.      Right Ear: External ear normal. Tenderness present. No drainage. A PE tube is present. Tympanic membrane is injected and erythematous.      Left Ear: External ear normal.      Nose: No congestion or rhinorrhea.      Mouth/Throat:      Pharynx: No oropharyngeal exudate or posterior oropharyngeal erythema.   Eyes:      General:         Right eye: No discharge.         Left eye: No discharge.      Pupils: Pupils are equal, round, and reactive to light.   Pulmonary:      Effort: Pulmonary effort is normal.   Abdominal:      General: Abdomen is flat.   Musculoskeletal:         General: Normal range of motion.      Cervical back: Normal range of motion and neck supple.   Skin:     General: Skin is dry.   Neurological:      General: No focal deficit present.      Mental Status: She is alert and oriented to person, place, and time. Mental status is at baseline.   Psychiatric:         Mood and Affect: Mood normal.         Behavior: Behavior normal.         Thought Content: Thought content normal.         Judgment: Judgment normal.         Assessment/Plan:   Assessment    1. Recurrent acute serous otitis media of right ear  ciprofloxacin/dexamethasone (CIPRODEX) 0.3-0.1 % Suspension    predniSONE (DELTASONE) 20 MG Tab    DISCONTINUED: predniSONE (DELTASONE) 20 MG Tab        Patient was started on ciprofloxacin/dexamethasone as well as prednisone for treatment of recurrent otitis media of right ear.  Patient to follow-up with ENT as previously scheduled or sooner in urgent care/ER if symptoms persist or worsen.  She is in agreement with plan of care.

## 2025-04-02 RX ORDER — PREDNISONE 20 MG/1
40 TABLET ORAL DAILY
Qty: 10 TABLET | Refills: 0 | Status: SHIPPED | OUTPATIENT
Start: 2025-04-02 | End: 2025-04-07

## 2025-05-13 ENCOUNTER — HOSPITAL ENCOUNTER (OUTPATIENT)
Dept: LAB | Facility: MEDICAL CENTER | Age: 55
End: 2025-05-13
Attending: COLON & RECTAL SURGERY
Payer: MEDICAID

## 2025-05-13 LAB
BASOPHILS # BLD AUTO: 1 % (ref 0–1.8)
BASOPHILS # BLD: 0.07 K/UL (ref 0–0.12)
EOSINOPHIL # BLD AUTO: 0.11 K/UL (ref 0–0.51)
EOSINOPHIL NFR BLD: 1.5 % (ref 0–6.9)
ERYTHROCYTE [DISTWIDTH] IN BLOOD BY AUTOMATED COUNT: 44.6 FL (ref 35.9–50)
HCT VFR BLD AUTO: 41.2 % (ref 37–47)
HGB BLD-MCNC: 13.6 G/DL (ref 12–16)
IMM GRANULOCYTES # BLD AUTO: 0.02 K/UL (ref 0–0.11)
IMM GRANULOCYTES NFR BLD AUTO: 0.3 % (ref 0–0.9)
LYMPHOCYTES # BLD AUTO: 2.38 K/UL (ref 1–4.8)
LYMPHOCYTES NFR BLD: 32.5 % (ref 22–41)
MCH RBC QN AUTO: 30.8 PG (ref 27–33)
MCHC RBC AUTO-ENTMCNC: 33 G/DL (ref 32.2–35.5)
MCV RBC AUTO: 93.4 FL (ref 81.4–97.8)
MONOCYTES # BLD AUTO: 0.45 K/UL (ref 0–0.85)
MONOCYTES NFR BLD AUTO: 6.1 % (ref 0–13.4)
NEUTROPHILS # BLD AUTO: 4.29 K/UL (ref 1.82–7.42)
NEUTROPHILS NFR BLD: 58.6 % (ref 44–72)
NRBC # BLD AUTO: 0 K/UL
NRBC BLD-RTO: 0 /100 WBC (ref 0–0.2)
PLATELET # BLD AUTO: 341 K/UL (ref 164–446)
PMV BLD AUTO: 11.2 FL (ref 9–12.9)
RBC # BLD AUTO: 4.41 M/UL (ref 4.2–5.4)
WBC # BLD AUTO: 7.3 K/UL (ref 4.8–10.8)

## 2025-05-13 PROCEDURE — 82607 VITAMIN B-12: CPT

## 2025-05-13 PROCEDURE — 83540 ASSAY OF IRON: CPT

## 2025-05-13 PROCEDURE — 84425 ASSAY OF VITAMIN B-1: CPT

## 2025-05-13 PROCEDURE — 84630 ASSAY OF ZINC: CPT

## 2025-05-13 PROCEDURE — 36415 COLL VENOUS BLD VENIPUNCTURE: CPT

## 2025-05-13 PROCEDURE — 85025 COMPLETE CBC W/AUTO DIFF WBC: CPT

## 2025-05-13 PROCEDURE — 82652 VIT D 1 25-DIHYDROXY: CPT

## 2025-05-13 PROCEDURE — 84134 ASSAY OF PREALBUMIN: CPT

## 2025-05-13 PROCEDURE — 80053 COMPREHEN METABOLIC PANEL: CPT

## 2025-05-13 PROCEDURE — 80061 LIPID PANEL: CPT

## 2025-05-13 PROCEDURE — 82728 ASSAY OF FERRITIN: CPT

## 2025-05-13 PROCEDURE — 84252 ASSAY OF VITAMIN B-2: CPT

## 2025-05-13 PROCEDURE — 84466 ASSAY OF TRANSFERRIN: CPT

## 2025-05-13 PROCEDURE — 84207 ASSAY OF VITAMIN B-6: CPT

## 2025-05-13 PROCEDURE — 82746 ASSAY OF FOLIC ACID SERUM: CPT

## 2025-05-14 LAB
ALBUMIN SERPL BCP-MCNC: 3.5 G/DL (ref 3.2–4.9)
ALBUMIN/GLOB SERPL: 1.1 G/DL
ALP SERPL-CCNC: 123 U/L (ref 30–99)
ALT SERPL-CCNC: 33 U/L (ref 2–50)
ANION GAP SERPL CALC-SCNC: 7 MMOL/L (ref 7–16)
AST SERPL-CCNC: 33 U/L (ref 12–45)
BILIRUB SERPL-MCNC: 0.4 MG/DL (ref 0.1–1.5)
BUN SERPL-MCNC: 8 MG/DL (ref 8–22)
CALCIUM ALBUM COR SERPL-MCNC: 9.1 MG/DL (ref 8.5–10.5)
CALCIUM SERPL-MCNC: 8.7 MG/DL (ref 8.5–10.5)
CHLORIDE SERPL-SCNC: 101 MMOL/L (ref 96–112)
CHOLEST SERPL-MCNC: 132 MG/DL (ref 100–199)
CO2 SERPL-SCNC: 28 MMOL/L (ref 20–33)
CREAT SERPL-MCNC: 0.71 MG/DL (ref 0.5–1.4)
FASTING STATUS PATIENT QL REPORTED: NORMAL
FERRITIN SERPL-MCNC: 58.4 NG/ML (ref 10–291)
FOLATE SERPL-MCNC: 4.2 NG/ML
GFR SERPLBLD CREATININE-BSD FMLA CKD-EPI: 102 ML/MIN/1.73 M 2
GLOBULIN SER CALC-MCNC: 3.3 G/DL (ref 1.9–3.5)
GLUCOSE SERPL-MCNC: 96 MG/DL (ref 65–99)
HDLC SERPL-MCNC: 60 MG/DL
IRON SERPL-MCNC: 88 UG/DL (ref 40–170)
LDLC SERPL CALC-MCNC: 60 MG/DL
POTASSIUM SERPL-SCNC: 4 MMOL/L (ref 3.6–5.5)
PREALB SERPL-MCNC: 13.9 MG/DL (ref 18–38)
PROT SERPL-MCNC: 6.8 G/DL (ref 6–8.2)
SODIUM SERPL-SCNC: 136 MMOL/L (ref 135–145)
TRANSFERRIN SERPL-MCNC: 260 MG/DL (ref 200–370)
TRIGL SERPL-MCNC: 62 MG/DL (ref 0–149)
VIT B12 SERPL-MCNC: 1213 PG/ML (ref 211–911)

## 2025-05-15 LAB
1,25(OH)2D3 SERPL-MCNC: 92.1 PG/ML (ref 19.9–79.3)
ZINC SERPL-MCNC: 53.5 UG/DL (ref 60–120)

## 2025-05-17 LAB — VIT B1 BLD-MCNC: 113 NMOL/L (ref 70–180)

## 2025-05-18 LAB — VIT B2 SERPL-SCNC: 7 NMOL/L (ref 5–50)

## 2025-05-19 LAB — VIT B6 SERPL-MCNC: 26.7 NMOL/L (ref 20–125)

## 2025-05-28 ENCOUNTER — HOSPITAL ENCOUNTER (OUTPATIENT)
Facility: MEDICAL CENTER | Age: 55
End: 2025-05-28
Attending: COLON & RECTAL SURGERY | Admitting: COLON & RECTAL SURGERY
Payer: MEDICAID

## 2025-05-28 ENCOUNTER — APPOINTMENT (OUTPATIENT)
Dept: ADMISSIONS | Facility: MEDICAL CENTER | Age: 55
End: 2025-05-28
Attending: COLON & RECTAL SURGERY
Payer: MEDICAID

## 2025-06-03 RX ORDER — ACETAMINOPHEN 10 MG/ML
1000 INJECTION, SOLUTION INTRAVENOUS ONCE
Status: CANCELLED | OUTPATIENT
Start: 2025-06-04 | End: 2025-06-04

## 2025-06-03 RX ORDER — OXYCODONE HCL 10 MG/1
10 TABLET, FILM COATED, EXTENDED RELEASE ORAL ONCE
Refills: 0 | Status: CANCELLED | OUTPATIENT
Start: 2025-06-04 | End: 2025-06-04

## 2025-06-03 RX ORDER — SCOPOLAMINE 1 MG/3D
1 PATCH, EXTENDED RELEASE TRANSDERMAL ONCE
Status: CANCELLED | OUTPATIENT
Start: 2025-06-04 | End: 2025-06-04

## 2025-06-03 RX ORDER — GABAPENTIN 300 MG/1
300 CAPSULE ORAL ONCE
Status: CANCELLED | OUTPATIENT
Start: 2025-06-04 | End: 2025-06-04

## 2025-06-17 ENCOUNTER — HOSPITAL ENCOUNTER (OUTPATIENT)
Facility: MEDICAL CENTER | Age: 55
End: 2025-06-17
Attending: COLON & RECTAL SURGERY | Admitting: COLON & RECTAL SURGERY
Payer: MEDICAID

## 2025-06-18 ENCOUNTER — APPOINTMENT (OUTPATIENT)
Dept: ADMISSIONS | Facility: MEDICAL CENTER | Age: 55
End: 2025-06-18
Attending: COLON & RECTAL SURGERY
Payer: MEDICAID

## 2025-06-20 ENCOUNTER — PRE-ADMISSION TESTING (OUTPATIENT)
Dept: ADMISSIONS | Facility: MEDICAL CENTER | Age: 55
End: 2025-06-20
Attending: COLON & RECTAL SURGERY
Payer: MEDICAID

## 2025-06-20 RX ORDER — OLANZAPINE 10 MG/1
10 TABLET, FILM COATED ORAL NIGHTLY
COMMUNITY
Start: 2025-06-08

## 2025-06-20 RX ORDER — ACAMPROSATE CALCIUM 333 MG/1
666 TABLET, DELAYED RELEASE ORAL 2 TIMES DAILY
COMMUNITY
Start: 2025-03-03 | End: 2025-08-30

## 2025-06-20 NOTE — PREADMIT AVS NOTE
Current Medications   Medication Instructions    olanzapine (ZYPREXA) 10 MG tablet Continue taking as prescribed.    acamprosate (CAMPRAL) 333 MG tablet Continue taking as prescribed.    lisinopril-hydrochlorothiazide (PRINZIDE) 20-12.5 MG per tablet Stop 24 hours before surgery    omeprazole (PRILOSEC) 40 MG delayed-release capsule Continue taking as prescribed.    spironolactone (ALDACTONE) 25 MG Tab Hold medication day of procedure    sucralfate (CARAFATE) 1 GM Tab Hold medication day of procedure    buPROPion (WELLBUTRIN SR) 200 MG SR tablet Continue taking as prescribed.    naltrexone (DEPADE) 50 MG Tab May need to hold prior to surgery; please call prescribing doctor/surgeon today to get instructions in regards to surgery on 6/25/2025

## 2025-06-24 ENCOUNTER — APPOINTMENT (OUTPATIENT)
Dept: ADMISSIONS | Facility: MEDICAL CENTER | Age: 55
End: 2025-06-24
Attending: COLON & RECTAL SURGERY
Payer: MEDICAID

## 2025-06-24 ENCOUNTER — HOSPITAL ENCOUNTER (OUTPATIENT)
Dept: RADIOLOGY | Facility: MEDICAL CENTER | Age: 55
End: 2025-06-24
Attending: COLON & RECTAL SURGERY | Admitting: COLON & RECTAL SURGERY
Payer: MEDICAID

## 2025-06-24 DIAGNOSIS — Z01.810 PRE-OPERATIVE CARDIOVASCULAR EXAMINATION: Primary | ICD-10-CM

## 2025-06-24 DIAGNOSIS — Z01.812 PRE-OPERATIVE LABORATORY EXAMINATION: ICD-10-CM

## 2025-06-24 DIAGNOSIS — Z01.811 PRE-OPERATIVE RESPIRATORY EXAMINATION: ICD-10-CM

## 2025-06-24 LAB
EKG IMPRESSION: NORMAL
HCG SERPL QL: NEGATIVE
INR PPP: 1.03 (ref 0.87–1.13)
PROTHROMBIN TIME: 13.5 SEC (ref 12–14.6)

## 2025-06-24 PROCEDURE — 93010 ELECTROCARDIOGRAM REPORT: CPT | Performed by: INTERNAL MEDICINE

## 2025-06-24 PROCEDURE — 85610 PROTHROMBIN TIME: CPT

## 2025-06-24 PROCEDURE — 84703 CHORIONIC GONADOTROPIN ASSAY: CPT

## 2025-06-24 PROCEDURE — 36415 COLL VENOUS BLD VENIPUNCTURE: CPT

## 2025-06-24 PROCEDURE — 71045 X-RAY EXAM CHEST 1 VIEW: CPT

## 2025-06-24 PROCEDURE — 93005 ELECTROCARDIOGRAM TRACING: CPT | Mod: TC

## 2025-06-24 RX ORDER — ACETAMINOPHEN 10 MG/ML
1000 INJECTION, SOLUTION INTRAVENOUS
OUTPATIENT
Start: 2025-06-25 | End: 2025-06-26

## 2025-06-24 RX ORDER — GABAPENTIN 300 MG/1
300 CAPSULE ORAL
OUTPATIENT
Start: 2025-06-25 | End: 2025-06-26

## 2025-06-24 RX ORDER — OXYCODONE HCL 10 MG/1
10 TABLET, FILM COATED, EXTENDED RELEASE ORAL
Refills: 0 | OUTPATIENT
Start: 2025-06-25 | End: 2025-06-26

## 2025-06-24 RX ORDER — SCOPOLAMINE 1 MG/3D
1 PATCH, EXTENDED RELEASE TRANSDERMAL
OUTPATIENT
Start: 2025-06-25 | End: 2025-06-28

## 2025-07-30 ENCOUNTER — OFFICE VISIT (OUTPATIENT)
Dept: URGENT CARE | Facility: PHYSICIAN GROUP | Age: 55
End: 2025-07-30
Payer: MEDICAID

## 2025-07-30 ENCOUNTER — APPOINTMENT (OUTPATIENT)
Dept: URGENT CARE | Facility: PHYSICIAN GROUP | Age: 55
End: 2025-07-30
Payer: MEDICAID

## 2025-07-30 VITALS
BODY MASS INDEX: 48.82 KG/M2 | HEART RATE: 88 BPM | DIASTOLIC BLOOD PRESSURE: 72 MMHG | HEIGHT: 65 IN | SYSTOLIC BLOOD PRESSURE: 124 MMHG | OXYGEN SATURATION: 96 % | WEIGHT: 293 LBS | RESPIRATION RATE: 16 BRPM | TEMPERATURE: 97.1 F

## 2025-07-30 DIAGNOSIS — M25.562 CHRONIC PAIN OF LEFT KNEE: ICD-10-CM

## 2025-07-30 DIAGNOSIS — G89.29 CHRONIC PAIN OF LEFT KNEE: ICD-10-CM

## 2025-07-30 DIAGNOSIS — H66.002 NON-RECURRENT ACUTE SUPPURATIVE OTITIS MEDIA OF LEFT EAR WITHOUT SPONTANEOUS RUPTURE OF TYMPANIC MEMBRANE: Primary | ICD-10-CM

## 2025-07-30 RX ORDER — ENOXAPARIN SODIUM 100 MG/ML
INJECTION SUBCUTANEOUS
COMMUNITY
Start: 2025-07-22

## 2025-07-30 RX ORDER — PREDNISONE 20 MG/1
40 TABLET ORAL DAILY
Qty: 10 TABLET | Refills: 0 | Status: SHIPPED | OUTPATIENT
Start: 2025-07-30 | End: 2025-08-04

## 2025-07-30 RX ORDER — CIPROFLOXACIN AND DEXAMETHASONE 3; 1 MG/ML; MG/ML
4 SUSPENSION/ DROPS AURICULAR (OTIC) 2 TIMES DAILY
Qty: 7.5 ML | Refills: 0 | Status: SHIPPED | OUTPATIENT
Start: 2025-07-30 | End: 2025-08-06

## 2025-07-30 ASSESSMENT — FIBROSIS 4 INDEX: FIB4 SCORE: 0.88

## 2025-07-30 NOTE — PROGRESS NOTES
"Subjective:     Nneka Ruiz is a 54 y.o. female who presents for Otalgia (Pt states possible ear infection in left ear. Pt states its been draining. Symptoms started yesterday along with drainage. )      Otalgia       Pt presents for evaluation of a new problem.  Jacqueline is a pleasant 54-year-old female who presents to urgent care today with complaints of left-sided ear pain that started yesterday.  She states that she started to develop drainage out of her left ear that worsened this morning.  She does note throbbing pain.  Pain at this time is tolerable.  She denies any fever, chills, sore throat or headache.  She unfortunately has been suffering from multiple reoccurring ear infections and is established with ENT.  She was supposed to have surgery last Friday however, this had to be rescheduled.  Patient states that Ciprodex usually works the best for her ear infections.    Review of Systems   HENT:  Positive for ear pain.        PMH: Past Medical History[1]  ALLERGIES: Allergies[2]  SURGHX: Past Surgical History[3]  SOCHX: Social History[4]  FH:   Family History   Problem Relation Age of Onset    Breast Cancer Mother         Double mastectomy    Breast Cancer Father          Objective:   /72   Pulse 88   Temp 36.2 °C (97.1 °F) (Temporal)   Resp 16   Ht 1.651 m (5' 5\")   Wt (!) 139 kg (306 lb 9.6 oz)   LMP 07/30/2025 (Exact Date)   SpO2 96%   Breastfeeding No   BMI 51.02 kg/m²     Physical Exam  Vitals and nursing note reviewed.   Constitutional:       General: She is not in acute distress.     Appearance: Normal appearance. She is normal weight. She is not ill-appearing or toxic-appearing.   HENT:      Head: Normocephalic.      Right Ear: External ear normal.      Left Ear: External ear normal. Drainage, swelling and tenderness present.      Ears:      Comments: Unable to visualize tympanic membrane due to purulent fluid.       Nose: No congestion or rhinorrhea.      Mouth/Throat:      " Pharynx: No oropharyngeal exudate or posterior oropharyngeal erythema.   Eyes:      General:         Right eye: No discharge.         Left eye: No discharge.      Pupils: Pupils are equal, round, and reactive to light.   Pulmonary:      Effort: Pulmonary effort is normal.   Abdominal:      General: Abdomen is flat.   Musculoskeletal:         General: Normal range of motion.      Cervical back: Normal range of motion and neck supple.   Skin:     General: Skin is dry.   Neurological:      General: No focal deficit present.      Mental Status: She is alert and oriented to person, place, and time. Mental status is at baseline.   Psychiatric:         Mood and Affect: Mood normal.         Behavior: Behavior normal.         Thought Content: Thought content normal.         Judgment: Judgment normal.         Assessment/Plan:   Assessment      1. Non-recurrent acute suppurative otitis media of left ear without spontaneous rupture of tympanic membrane  ciprofloxacin/dexamethasone (CIPRODEX) 0.3-0.1 % Suspension    predniSONE (DELTASONE) 20 MG Tab      2. Chronic pain of left knee  Referral to Orthopedics        Patient started on Ciprodex and prednisone for treatment of otitis media.  She is to notify me improving and I will send over oral antibiotic.  She is also complaining of chronic pain of her left knee.  Referral was given for her to follow-up with orthopedics.         [1]   Past Medical History:  Diagnosis Date    Anemia     during pregnancy    Anxiety     Bowel habit changes     constipation    Cancer (HCC) 2009    uterine    Cold     history of    Dental disorder     lower partial    Fatty liver disease, nonalcoholic     Gynecological disorder     menorrhagia    Heart burn     Hiatus hernia syndrome     Hypertension     medicated    Pain 2014    left knee    PONV (postoperative nausea and vomiting)     Psychiatric problem     anxiety/depression   [2]   Allergies  Allergen Reactions    Aspirin Vomiting    Other Misc   "    Waterproof band aids took off skin and caused infection     Nsaids Unspecified     Pt states not allergic to ALL nsaids, just \"not supposed to take them after gastric sleeve surgery\"   [3]   Past Surgical History:  Procedure Laterality Date    GASTRIC BYPASS LAPAROSCOPIC  3/20/2019    Procedure: GASTRIC BYPASS LAPAROSCOPIC- REVISION OF GASTRIC RESTRICTION WITH SINGLE ANASTAMOSIS DUODENAL- INTESTINAL BYPASS;  Surgeon: Keegan Hernandez M.D.;  Location: SURGERY [facility];  Service: General    HIATAL HERNIA REPAIR  3/20/2019    Procedure: HIATAL HERNIA REPAIR- POSSIBLE OPEN;  Surgeon: Keegan Hernandez M.D.;  Location: SURGERY [facility];  Service: General    ORIF, FRACTURE, HUMERUS Left 3/1/2019    Procedure: HUMERUS ORIF;  Surgeon: Bandar Block M.D.;  Location: SURGERY [facility];  Service: Orthopedics    GASTROSCOPY  5/17/2017    Procedure: GASTROSCOPY W/DILATATION, 30-MM ACHALASIA;  Surgeon: Keegan Hernandez M.D.;  Location: SURGERY [facility];  Service:     GASTRIC SLEEVE LAPAROSCOPY N/A 2/3/2016    Procedure: GASTRIC SLEEVE LAPAROSCOPY AND LIVER BIOPSY;  Surgeon: Keegan Hernandez M.D.;  Location: SURGERY [facility];  Service:     KNEE ARTHROSCOPY  12/10/2014    Performed by Mikel Spencer M.D. at [facility]    MENISCECTOMY, KNEE, MEDIAL  12/10/2014    Performed by Mikel Spencer M.D. at [facility]    MENISCECTOMY  12/10/2014    Performed by Mikel Spencer M.D. at [facility]    LATERAL RELEASE  12/10/2014    Performed by Mikel Spencer M.D. at [facility]    TYMPANOPLASTY  11/6/2012    Performed by Jonn Kaufman M.D. at SURGERY [facility] DAY [facility]    GYN SURGERY      D&C-IUD    TONSILLECTOMY      TUBAL LIGATION     [4]   Social History  Socioeconomic History    Marital status: Single   Tobacco Use    Smoking status: Former     Current packs/day: 0.00     Average packs/day: 1 pack/day for 12.0 years (12.0 ttl pk-yrs) "     Types: Cigarettes     Start date: 2007     Quit date: 2019     Years since quittin.4     Passive exposure: Current    Smokeless tobacco: Never    Tobacco comments:     2 yrs  / ppd     2025 pt declines tobacco cessation flier during telephone preadmit appointment   Vaping Use    Vaping status: Every Day    Substances: Nicotine   Substance and Sexual Activity    Alcohol use: Not Currently     Comment: 1 pint of ron nightly     Drug use: Not Currently     Types: Inhaled     Comment: meth  none since     Sexual activity: Yes     Partners: Male

## 2025-08-12 ENCOUNTER — OFFICE VISIT (OUTPATIENT)
Dept: URGENT CARE | Facility: PHYSICIAN GROUP | Age: 55
End: 2025-08-12
Payer: MEDICAID

## 2025-08-12 VITALS
SYSTOLIC BLOOD PRESSURE: 114 MMHG | DIASTOLIC BLOOD PRESSURE: 70 MMHG | BODY MASS INDEX: 47.09 KG/M2 | OXYGEN SATURATION: 96 % | HEART RATE: 76 BPM | HEIGHT: 66 IN | RESPIRATION RATE: 16 BRPM | TEMPERATURE: 98 F | WEIGHT: 293 LBS

## 2025-08-12 DIAGNOSIS — K13.70 ORAL LESION: ICD-10-CM

## 2025-08-12 DIAGNOSIS — K13.79 ORAL PAIN: Primary | ICD-10-CM

## 2025-08-12 DIAGNOSIS — K12.2 ORAL CELLULITIS: ICD-10-CM

## 2025-08-12 PROCEDURE — 99213 OFFICE O/P EST LOW 20 MIN: CPT | Performed by: NURSE PRACTITIONER

## 2025-08-12 PROCEDURE — 3078F DIAST BP <80 MM HG: CPT | Performed by: NURSE PRACTITIONER

## 2025-08-12 PROCEDURE — 3074F SYST BP LT 130 MM HG: CPT | Performed by: NURSE PRACTITIONER

## 2025-08-12 RX ORDER — DEXAMETHASONE 0.5 MG/5ML
1 SOLUTION ORAL EVERY 6 HOURS
Qty: 240 ML | Refills: 0 | Status: SHIPPED | OUTPATIENT
Start: 2025-08-12 | End: 2025-08-19

## 2025-08-12 ASSESSMENT — VISUAL ACUITY: OU: 1

## 2025-08-12 ASSESSMENT — FIBROSIS 4 INDEX: FIB4 SCORE: 0.88

## 2025-08-21 DIAGNOSIS — M25.562 LEFT KNEE PAIN, UNSPECIFIED CHRONICITY: Primary | ICD-10-CM

## 2025-08-25 ENCOUNTER — HOSPITAL ENCOUNTER (OUTPATIENT)
Dept: RADIOLOGY | Facility: MEDICAL CENTER | Age: 55
End: 2025-08-25
Attending: ORTHOPAEDIC SURGERY
Payer: MEDICAID

## 2025-08-25 PROCEDURE — 73562 X-RAY EXAM OF KNEE 3: CPT | Mod: LT

## 2025-08-25 PROCEDURE — 73565 X-RAY EXAM OF KNEES: CPT

## 2025-08-26 ENCOUNTER — OFFICE VISIT (OUTPATIENT)
Facility: MEDICAL CENTER | Age: 55
End: 2025-08-26
Payer: MEDICAID

## 2025-08-26 VITALS
BODY MASS INDEX: 47.09 KG/M2 | OXYGEN SATURATION: 99 % | SYSTOLIC BLOOD PRESSURE: 120 MMHG | HEIGHT: 66 IN | DIASTOLIC BLOOD PRESSURE: 70 MMHG | WEIGHT: 293 LBS | HEART RATE: 71 BPM | TEMPERATURE: 97.6 F

## 2025-08-26 DIAGNOSIS — M17.12 PRIMARY OSTEOARTHRITIS OF LEFT KNEE: Primary | ICD-10-CM

## 2025-08-26 DIAGNOSIS — M17.11 PRIMARY OSTEOARTHRITIS OF RIGHT KNEE: ICD-10-CM

## 2025-08-26 PROCEDURE — 20610 DRAIN/INJ JOINT/BURSA W/O US: CPT | Mod: LT | Performed by: ORTHOPAEDIC SURGERY

## 2025-08-26 PROCEDURE — 99203 OFFICE O/P NEW LOW 30 MIN: CPT | Mod: 25 | Performed by: ORTHOPAEDIC SURGERY

## 2025-08-26 PROCEDURE — 3078F DIAST BP <80 MM HG: CPT | Performed by: ORTHOPAEDIC SURGERY

## 2025-08-26 PROCEDURE — 3074F SYST BP LT 130 MM HG: CPT | Performed by: ORTHOPAEDIC SURGERY

## 2025-08-26 RX ORDER — METHYLPREDNISOLONE ACETATE 40 MG/ML
40 INJECTION, SUSPENSION INTRA-ARTICULAR; INTRALESIONAL; INTRAMUSCULAR; SOFT TISSUE ONCE
Status: COMPLETED | OUTPATIENT
Start: 2025-08-26 | End: 2025-08-26

## 2025-08-26 RX ADMIN — METHYLPREDNISOLONE ACETATE 40 MG: 40 INJECTION, SUSPENSION INTRA-ARTICULAR; INTRALESIONAL; INTRAMUSCULAR; SOFT TISSUE at 09:24

## 2025-08-26 RX ADMIN — Medication 4 ML: at 09:24

## 2025-08-26 ASSESSMENT — ENCOUNTER SYMPTOMS
SENSORY CHANGE: 0
SHORTNESS OF BREATH: 0
WEAKNESS: 0
TINGLING: 0

## 2025-08-26 ASSESSMENT — FIBROSIS 4 INDEX: FIB4 SCORE: 0.89

## (undated) DEVICE — TROCAR Z THREAD12MM OPTICAL - NON BLADED (6/BX)

## (undated) DEVICE — KIT ANESTHESIA W/CIRCUIT & 3/LT BAG W/FILTER (20EA/CA)

## (undated) DEVICE — BASIN EMESIS DISP. - (250/CA)

## (undated) DEVICE — BITE BLOCK ADULT 60FR (100EA/CA)

## (undated) DEVICE — CANISTER SUCTION 3000ML MECHANICAL FILTER AUTO SHUTOFF MEDI-VAC NONSTERILE LF DISP  (40EA/CA)

## (undated) DEVICE — DRAPE LARGE 3 QUARTER - (20/CA)

## (undated) DEVICE — MASK ANESTHESIA ADULT  - (100/CA)

## (undated) DEVICE — DRAPE SURGICAL U 77X120 - (10/CA)

## (undated) DEVICE — PACK GASTRIC BANDING OR - (1/CA)

## (undated) DEVICE — PACK LOWER EXTREMITY - (2/CA)

## (undated) DEVICE — PACK MAJOR ORTHO - (2EA/CA)

## (undated) DEVICE — CANNULA W/SEAL 5X100 Z-THRE - ADED KII (12/BX)

## (undated) DEVICE — SENSOR SPO2 NEO LNCS ADHESIVE (20/BX) SEE USER NOTES

## (undated) DEVICE — CANISTER SUCTION RIGID RED 1500CC (40EA/CA)

## (undated) DEVICE — DRAPE C-ARM LARGE 41IN X 74 IN - (10/BX 2BX/CA)

## (undated) DEVICE — PACK MAJOR BASIN - (2EA/CA)

## (undated) DEVICE — STAPLE 60MM WHTE 2.6MM - (12/BX) WAS PART #ECR60W

## (undated) DEVICE — LACTATED RINGERS INJ 1000 ML - (14EA/CA 60CA/PF)

## (undated) DEVICE — NEEDLE MONOPTY 14GAX16CM - (10EA/CA)

## (undated) DEVICE — ELECTRODE DUAL RETURN W/ CORD - (50/PK)

## (undated) DEVICE — SUCTION INSTRUMENT YANKAUER BULBOUS TIP W/O VENT (50EA/CA)

## (undated) DEVICE — SUTURE 4-0 VICRYL PLUSFS-1 - 27 INCH (36/BX)

## (undated) DEVICE — NEPTUNE 4 PORT MANIFOLD - (20/PK)

## (undated) DEVICE — SUTURE 0 VICRYL PLUS CT-1 - 36 INCH (36/BX)

## (undated) DEVICE — SET SUCTION/IRRIGATION WITH DISPOSABLE TIP (6/CA )PART #0250-070-520 IS A SUB

## (undated) DEVICE — SODIUM CHL IRRIGATION 0.9% 1000ML (12EA/CA)

## (undated) DEVICE — HANDPIECE 10FT INTPLS SCT PLS IRRIGATION HAND CONTROL SET (6/PK)

## (undated) DEVICE — TUBING CLEARLINK DUO-VENT - C-FLO (48EA/CA)

## (undated) DEVICE — HEAD HOLDER JUNIOR/ADULT

## (undated) DEVICE — GLOVE BIOGEL INDICATOR SZ 7.5 SURGICAL PF LTX - (50PR/BX 4BX/CA)

## (undated) DEVICE — DRESSING NON ADHERENT 3 X 4 - STERILE (100/BX 12BX/CA)

## (undated) DEVICE — TUBE SUCTION YANKAUER  1/4 X 6FT (20EA/CA)"

## (undated) DEVICE — BALLOON RIGIFLEX SINGLE USE ABD 30MM

## (undated) DEVICE — DRILL BIT SYN 2.8 CALIB (8TX2=16)

## (undated) DEVICE — TUBE CONNECT SUCTION CLEAR 120 X 1/4" (50EA/CA)"

## (undated) DEVICE — DRILL BIT 2.0 LONG 310.21 - (6TX212) SDSTB=2

## (undated) DEVICE — STAPLER POWERED 45MM (3EA/BX)

## (undated) DEVICE — STAPLE 45MM GRAY 2.0MM (12EA/BX)

## (undated) DEVICE — GLOVE BIOGEL SZ 7 SURGICAL PF LTX - (50PR/BX 4BX/CA)

## (undated) DEVICE — DRAPE LAPAROTOMY T SHEET - (12EA/CA)

## (undated) DEVICE — TUBING INSUFFLATION - (10/BX)

## (undated) DEVICE — GOWN WARMING STANDARD FLEX - (30/CA)

## (undated) DEVICE — NEEDLE NON SAFETY HYPO 22 GA X 1 1/2 IN (100/BX)

## (undated) DEVICE — GLOVE BIOGEL M SZ 8 SURGICAL PF LTX - (50/BX 4BX/CA)

## (undated) DEVICE — SET EXTENSION WITH 2 PORTS (48EA/CA) ***PART #2C8610 IS A SUBSTITUTE*****

## (undated) DEVICE — DRAPE SURG STERI-DRAPE 7X11OD - (40EA/CA)

## (undated) DEVICE — LEAD SET 6 DISP. EKG NIHON KOHDEN

## (undated) DEVICE — SPONGE GAUZESTER 4 X 4 4PLY - (128PK/CA)

## (undated) DEVICE — BOVIE BLADE COATED - (50/PK)

## (undated) DEVICE — DRILL BIT 2.7 125MM 315.28 (5TX2+1TX1=11)

## (undated) DEVICE — GUIDE TRACHE TUBE INTUBATING STYLET 5.0-10.0MM 14FR (20EA/PK)

## (undated) DEVICE — TUBE NG SALEM SUMP 16FR (50EA/CA)

## (undated) DEVICE — KIT  I.V. START (100EA/CA)

## (undated) DEVICE — PROTECTOR ULNA NERVE - (36PR/CA)

## (undated) DEVICE — SET LEADWIRE 5 LEAD BEDSIDE DISPOSABLE ECG (1SET OF 5/EA)

## (undated) DEVICE — SLEEVE, VASO, THIGH, MED

## (undated) DEVICE — PAD LAP STERILE 18 X 18 - (5/PK 40PK/CA)

## (undated) DEVICE — TUBE E-T HI-LO CUFF 7.5MM (10EA/PK)

## (undated) DEVICE — SUTURE 2-0 VICRYL PLUS TP-1 - (24/BX)

## (undated) DEVICE — TUBE CONNECTING SUCTION - CLEAR PLASTIC STERILE 72 IN (50EA/CA)

## (undated) DEVICE — GLOVE BIOGEL INDICATOR SZ 8 SURGICAL PF LTX - (50/BX 4BX/CA)

## (undated) DEVICE — STAPLE 45MM BLUE 3.5MM ECHELON (12EA/BX)

## (undated) DEVICE — SUTURE 2-0 VICRYL PLUS CT-1 36 (36PK/BX)"

## (undated) DEVICE — BLADE SAGITTAL SAW 9.4MM X 25.5MM X .4MM FINE TOOTH (1/EA)

## (undated) DEVICE — BLADE SURGICAL #15 - (50/BX 3BX/CA)

## (undated) DEVICE — CATHETER IV SAFETY 22 GA X 1 (50EA/BX)

## (undated) DEVICE — DRILL BIT 2.5 TWIST 310.25 (8TX2+1TX3=19)

## (undated) DEVICE — TROCAR, CAN&SEAL 5X100MM C0509

## (undated) DEVICE — RELOAD WITH GRIPPING SURFACE TECHNOLOGY GOLD 60MM (12EA/BX)

## (undated) DEVICE — SPONGE GAUZE NON-STERILE 4X4 - (2000/CA 10PK/CA)

## (undated) DEVICE — ELECTRODE 850 FOAM ADHESIVE - HYDROGEL RADIOTRNSPRNT (50/PK)

## (undated) DEVICE — KIT ROOM DECONTAMINATION

## (undated) DEVICE — SUTURE GENERAL

## (undated) DEVICE — TUBE E-T HI-LO CUFF 8.0MM (10EA/PK)

## (undated) DEVICE — RELOAD WITH GRIPPING SURFACE TECHNOLOGY BLUE 60MM (12EA/BX)

## (undated) DEVICE — STAPLER POWERED 60MM (3EA/BX)

## (undated) DEVICE — DRAPE 36X28IN RAD CARM BND BG - (25/CA) O

## (undated) DEVICE — APPLICATOR DUPLO SPRAYER (5EA/CA)

## (undated) DEVICE — SYSTEM CALIBARATION  GASTRECTOMY 40FR (5/BX)

## (undated) DEVICE — DRAIN PENROSE STERILE 1/4 X - 18 IN  (25EA/BX)

## (undated) DEVICE — SEALER ARTICULATING TISSUE NSEAL (6/BX)

## (undated) DEVICE — STAPLER SKIN DISP - (6/BX 10BX/CA) VISISTAT

## (undated) DEVICE — CANNULA W/ SUPPLY TUBING O2 - (50/CA)

## (undated) DEVICE — TISSEEL 4ML ----MUST ORDER A MIN OF 6EA----

## (undated) DEVICE — WATER IRRIGATION STERILE 1000ML (12EA/CA)

## (undated) DEVICE — ENDOSTITCH  POLY 0 48 VIO DLU - (12EA/CA)

## (undated) DEVICE — ELECTRODE 5MM LHK LAPSCP STERILE DISP- MEGADYNE  (5/CA)

## (undated) DEVICE — SYRINGE 30 ML LL (56/BX)

## (undated) DEVICE — WATER IRRIG. STER. 1500 ML - (9/CA)

## (undated) DEVICE — CHLORAPREP 26 ML APPLICATOR - ORANGE TINT(25/CA)

## (undated) DEVICE — CONTAINER, SPECIMEN, STERILE

## (undated) DEVICE — KIT CUSTOM PROCEDURE SINGLE FOR ENDO  (15/CA)

## (undated) DEVICE — SUTURE 3-0 VICRYL PLUS SH - 8X 18 INCH (12/BX)

## (undated) DEVICE — SOD. CHL. INJ. 0.9% 1000 ML - (14EA/CA 60CA/PF)

## (undated) DEVICE — FIBERWIRE 2.0 (12EA/BX)

## (undated) DEVICE — DRESSING LEUKOMED STERILE 11.75X4IN - (50/CA)

## (undated) DEVICE — ENDOSTITCH10MM SUTURING DEVIC - (3/CA)

## (undated) DEVICE — CANNULA W/SEAL12X100ZTHREAD - (12/BX)

## (undated) DEVICE — TIP INTPLS HFLO ML ORFC BTRY - (12/CS)  FOR SURGILAV

## (undated) DEVICE — TROCAR 5X100 NON BLADED Z-TH - READ KII (6/BX)

## (undated) DEVICE — DRAPE IOBAN II INCISE 23X17 - (10EA/BX 4BX/CA)

## (undated) DEVICE — MASK WITH FACE SHIELD (25/BX 4BX/CA)